# Patient Record
Sex: MALE | Race: WHITE | Employment: OTHER | URBAN - METROPOLITAN AREA
[De-identification: names, ages, dates, MRNs, and addresses within clinical notes are randomized per-mention and may not be internally consistent; named-entity substitution may affect disease eponyms.]

---

## 2018-08-19 PROBLEM — C43.59 MELANOMA OF BACK (HCC): Status: ACTIVE | Noted: 2018-08-19

## 2018-08-23 PROBLEM — M25.551 PAIN OF RIGHT HIP JOINT: Status: ACTIVE | Noted: 2018-08-23

## 2018-08-23 PROBLEM — M70.61 TROCHANTERIC BURSITIS OF RIGHT HIP: Status: ACTIVE | Noted: 2018-08-23

## 2018-09-07 ENCOUNTER — HOSPITAL ENCOUNTER (OUTPATIENT)
Dept: NUCLEAR MEDICINE | Facility: HOSPITAL | Age: 63
Discharge: HOME | End: 2018-09-07
Attending: SURGERY
Payer: COMMERCIAL

## 2018-09-07 ENCOUNTER — HOSPITAL ENCOUNTER (OUTPATIENT)
Facility: HOSPITAL | Age: 63
Setting detail: HOSPITAL OUTPATIENT SURGERY
Discharge: HOME | End: 2018-09-07
Attending: SURGERY | Admitting: SURGERY
Payer: COMMERCIAL

## 2018-09-07 ENCOUNTER — ANESTHESIA EVENT (OUTPATIENT)
Dept: SURGERY | Facility: HOSPITAL | Age: 63
End: 2018-09-07
Payer: COMMERCIAL

## 2018-09-07 ENCOUNTER — ANESTHESIA (OUTPATIENT)
Dept: SURGERY | Facility: HOSPITAL | Age: 63
End: 2018-09-07
Payer: COMMERCIAL

## 2018-09-07 VITALS
SYSTOLIC BLOOD PRESSURE: 128 MMHG | OXYGEN SATURATION: 92 % | RESPIRATION RATE: 16 BRPM | WEIGHT: 285 LBS | HEART RATE: 84 BPM | DIASTOLIC BLOOD PRESSURE: 78 MMHG | BODY MASS INDEX: 45.8 KG/M2 | TEMPERATURE: 97 F | HEIGHT: 66 IN

## 2018-09-07 DIAGNOSIS — C43.59 MELANOMA OF BACK (HCC): Primary | ICD-10-CM

## 2018-09-07 DIAGNOSIS — C43.9 MELANOMA (HCC): ICD-10-CM

## 2018-09-07 PROCEDURE — 88305 TISSUE EXAM BY PATHOLOGIST: CPT | Performed by: SURGERY

## 2018-09-07 PROCEDURE — 0HB6XZZ EXCISION OF BACK SKIN, EXTERNAL APPROACH: ICD-10-PCS | Performed by: SURGERY

## 2018-09-07 PROCEDURE — 88307 TISSUE EXAM BY PATHOLOGIST: CPT | Performed by: SURGERY

## 2018-09-07 PROCEDURE — 88341 IMHCHEM/IMCYTCHM EA ADD ANTB: CPT | Performed by: SURGERY

## 2018-09-07 PROCEDURE — 78195 LYMPH SYSTEM IMAGING: CPT | Performed by: SURGERY

## 2018-09-07 PROCEDURE — 88342 IMHCHEM/IMCYTCHM 1ST ANTB: CPT | Performed by: SURGERY

## 2018-09-07 PROCEDURE — 07B60ZX EXCISION OF LEFT AXILLARY LYMPHATIC, OPEN APPROACH, DIAGNOSTIC: ICD-10-PCS | Performed by: SURGERY

## 2018-09-07 PROCEDURE — 0JQ70ZZ REPAIR BACK SUBCUTANEOUS TISSUE AND FASCIA, OPEN APPROACH: ICD-10-PCS | Performed by: SURGERY

## 2018-09-07 RX ORDER — ROCURONIUM BROMIDE 10 MG/ML
INJECTION, SOLUTION INTRAVENOUS AS NEEDED
Status: DISCONTINUED | OUTPATIENT
Start: 2018-09-07 | End: 2018-09-07 | Stop reason: SURG

## 2018-09-07 RX ORDER — HYDROCODONE BITARTRATE AND ACETAMINOPHEN 5; 325 MG/1; MG/1
1 TABLET ORAL AS NEEDED
Status: DISCONTINUED | OUTPATIENT
Start: 2018-09-07 | End: 2018-09-07

## 2018-09-07 RX ORDER — MORPHINE SULFATE 4 MG/ML
4 INJECTION, SOLUTION INTRAMUSCULAR; INTRAVENOUS EVERY 10 MIN PRN
Status: DISCONTINUED | OUTPATIENT
Start: 2018-09-07 | End: 2018-09-07

## 2018-09-07 RX ORDER — MORPHINE SULFATE 4 MG/ML
2 INJECTION, SOLUTION INTRAMUSCULAR; INTRAVENOUS EVERY 10 MIN PRN
Status: DISCONTINUED | OUTPATIENT
Start: 2018-09-07 | End: 2018-09-07

## 2018-09-07 RX ORDER — ACETAMINOPHEN 500 MG
1000 TABLET ORAL ONCE
Status: COMPLETED | OUTPATIENT
Start: 2018-09-07 | End: 2018-09-07

## 2018-09-07 RX ORDER — SODIUM CHLORIDE, SODIUM LACTATE, POTASSIUM CHLORIDE, CALCIUM CHLORIDE 600; 310; 30; 20 MG/100ML; MG/100ML; MG/100ML; MG/100ML
INJECTION, SOLUTION INTRAVENOUS CONTINUOUS
Status: DISCONTINUED | OUTPATIENT
Start: 2018-09-07 | End: 2018-09-07

## 2018-09-07 RX ORDER — MORPHINE SULFATE 10 MG/ML
6 INJECTION, SOLUTION INTRAMUSCULAR; INTRAVENOUS EVERY 10 MIN PRN
Status: DISCONTINUED | OUTPATIENT
Start: 2018-09-07 | End: 2018-09-07

## 2018-09-07 RX ORDER — FAMOTIDINE 20 MG/1
20 TABLET ORAL ONCE
Status: COMPLETED | OUTPATIENT
Start: 2018-09-07 | End: 2018-09-07

## 2018-09-07 RX ORDER — HYDROCODONE BITARTRATE AND ACETAMINOPHEN 5; 325 MG/1; MG/1
1-2 TABLET ORAL EVERY 6 HOURS PRN
Qty: 30 TABLET | Refills: 0 | Status: SHIPPED | OUTPATIENT
Start: 2018-09-07 | End: 2018-09-27

## 2018-09-07 RX ORDER — HYDROCODONE BITARTRATE AND ACETAMINOPHEN 5; 325 MG/1; MG/1
2 TABLET ORAL AS NEEDED
Status: DISCONTINUED | OUTPATIENT
Start: 2018-09-07 | End: 2018-09-07

## 2018-09-07 RX ORDER — NALOXONE HYDROCHLORIDE 0.4 MG/ML
80 INJECTION, SOLUTION INTRAMUSCULAR; INTRAVENOUS; SUBCUTANEOUS AS NEEDED
Status: DISCONTINUED | OUTPATIENT
Start: 2018-09-07 | End: 2018-09-07

## 2018-09-07 RX ORDER — ONDANSETRON 2 MG/ML
4 INJECTION INTRAMUSCULAR; INTRAVENOUS ONCE AS NEEDED
Status: DISCONTINUED | OUTPATIENT
Start: 2018-09-07 | End: 2018-09-07

## 2018-09-07 RX ORDER — ONDANSETRON 2 MG/ML
INJECTION INTRAMUSCULAR; INTRAVENOUS AS NEEDED
Status: DISCONTINUED | OUTPATIENT
Start: 2018-09-07 | End: 2018-09-07 | Stop reason: SURG

## 2018-09-07 RX ORDER — DEXAMETHASONE SODIUM PHOSPHATE 4 MG/ML
VIAL (ML) INJECTION AS NEEDED
Status: DISCONTINUED | OUTPATIENT
Start: 2018-09-07 | End: 2018-09-07 | Stop reason: SURG

## 2018-09-07 RX ORDER — BUPIVACAINE HYDROCHLORIDE 5 MG/ML
INJECTION, SOLUTION EPIDURAL; INTRACAUDAL AS NEEDED
Status: DISCONTINUED | OUTPATIENT
Start: 2018-09-07 | End: 2018-09-07 | Stop reason: HOSPADM

## 2018-09-07 RX ORDER — GLYCOPYRROLATE 0.2 MG/ML
INJECTION INTRAMUSCULAR; INTRAVENOUS AS NEEDED
Status: DISCONTINUED | OUTPATIENT
Start: 2018-09-07 | End: 2018-09-07 | Stop reason: SURG

## 2018-09-07 RX ORDER — LIDOCAINE HYDROCHLORIDE 20 MG/ML
INJECTION, SOLUTION EPIDURAL; INFILTRATION; INTRACAUDAL; PERINEURAL AS NEEDED
Status: DISCONTINUED | OUTPATIENT
Start: 2018-09-07 | End: 2018-09-07 | Stop reason: SURG

## 2018-09-07 RX ORDER — METOCLOPRAMIDE 10 MG/1
10 TABLET ORAL ONCE
Status: COMPLETED | OUTPATIENT
Start: 2018-09-07 | End: 2018-09-07

## 2018-09-07 RX ADMIN — ONDANSETRON 4 MG: 2 INJECTION INTRAMUSCULAR; INTRAVENOUS at 09:41:00

## 2018-09-07 RX ADMIN — GLYCOPYRROLATE 0.2 MG: 0.2 INJECTION INTRAMUSCULAR; INTRAVENOUS at 09:41:00

## 2018-09-07 RX ADMIN — SODIUM CHLORIDE, SODIUM LACTATE, POTASSIUM CHLORIDE, CALCIUM CHLORIDE: 600; 310; 30; 20 INJECTION, SOLUTION INTRAVENOUS at 12:05:00

## 2018-09-07 RX ADMIN — ROCURONIUM BROMIDE 2 MG: 10 INJECTION, SOLUTION INTRAVENOUS at 09:41:00

## 2018-09-07 RX ADMIN — LIDOCAINE HYDROCHLORIDE 80 MG: 20 INJECTION, SOLUTION EPIDURAL; INFILTRATION; INTRACAUDAL; PERINEURAL at 09:41:00

## 2018-09-07 RX ADMIN — DEXAMETHASONE SODIUM PHOSPHATE 4 MG: 4 MG/ML VIAL (ML) INJECTION at 09:41:00

## 2018-09-07 RX ADMIN — SODIUM CHLORIDE, SODIUM LACTATE, POTASSIUM CHLORIDE, CALCIUM CHLORIDE: 600; 310; 30; 20 INJECTION, SOLUTION INTRAVENOUS at 09:35:00

## 2018-09-07 NOTE — BRIEF OP NOTE
[facility] POST ANESTHESIA CARE UNIT  Brief Op Note       Patients Name: Lyn Barrett  Attending Physician: Elvie Calhoun MD  Operating Physician: Elvie Calhoun MD  CSN: [id number]     Location:  OR  MRN: [id number]    Date of Birth: [birth year]

## 2018-09-07 NOTE — ANESTHESIA PREPROCEDURE EVALUATION
Anesthesia PreOp Note    HPI:     Estefany Bowman is a 61year old male who presents for preoperative consultation requested by: Katya Goldstein MD    Date of Surgery: 9/7/2018    Procedure(s):  EXCISION LESION TORSO/BACK  SENTINEL LYMPH NODE BIOPSY  In Sodium (ANCEF) 3 g in sodium chloride 0.9% 100 mL IVPB 3 g Intravenous Once Ann Amato MD      No current [facility]-ordered outpatient prescriptions on file.     No Known Allergies    Family History   Problem Relation Age of Onset   • Cancer Father      DONTA and Video laryngoscope  Post-op Pain Management: Oral pain medication  Informed Consent Plan and Risks Discussed With:  Patient and spouse      I have informed Katherine Tabor and/or legal guardian or family member of the nature of the anesthetic plan,

## 2018-09-07 NOTE — H&P
Bryanna Ha is a 61year old male complains of recent excision of melanoma left back upper to mid back. Patient with known severe psoriasis.   Pathology shows 0.9 mm  Breslow   Small amount of ulceration  1 mitosis per mm squared  No lymphovascula back as well  Patient shows me a picture of his back the lesion, it was sort of redness with some black, this area is clearly seen as a recent biopsy which is fairly large at least 2 x 2 cm.   HEENT: PERRLA, EOMI, anicteric, conjunctiva normal;   RESPIRATOR and sentinel lymph node.

## 2018-09-07 NOTE — ANESTHESIA PROCEDURE NOTES
Airway  Date/Time: 9/7/2018 9:45 AM  Urgency: elective    Airway not difficult    General Information and Staff    Patient location during procedure: OR  Anesthesiologist: ALEJANDRO ALDRICH  Resident/CRNA: Gage Miller  Performed: CRNA     Indications an

## 2018-09-07 NOTE — ANESTHESIA POSTPROCEDURE EVALUATION
Patient: Brent Black    Procedure Summary     Date:  09/07/18 Room / Location:  [address] [place] [address] MAIN OR 05 / [address] [place] [address] MAIN OR    Anesthesia Start:  5182 Anesthesia Stop:  8124    Procedures:       EXCISION LESION TORSO/BACK (Left )      SENTINEL LYMPH NODE BIOPSY (

## 2018-09-07 NOTE — DISCHARGE SUMMARY
Outpatient Surgery Brief Discharge Summary         Patient ID:  Paula Jackson  [id number]  88 year old  3/27/1955    Discharge Diagnoses: Melanoma back     Procedures:  Wide excision of lesion of back, sentinel lymph node biopsy    Discharged Neno may continue to shower after removal of dressing as long as it has been 48 hours since your surgery.     All incisions become somewhat hard and sometimes lumpy. That is part of the normal healing process.  Bruising around the incisions or lower is also norm bloated feeling in your abdomen. · To experience mild back pain or soreness for a day or two if you had spinal or epidural anesthesia. · If you had laparoscopic surgery, to feel shoulder pain or discomfort on the day of surgery.    · For some patients t

## 2018-09-10 NOTE — OPERATIVE REPORT
[facility]    PATIENT'S NAME: Jorge Luciano   ATTENDING PHYSICIAN: Evaristo Amador MD   OPERATING PHYSICIAN: Evaristo Amador MD   PATIENT ACCOUNT#:   [id number]    LOCATION:  [address] 10  MEDICAL RECORD #:   [id number]       DATE OF left, and got to approximately to at least 1.2 cm on each side of the previous scar, total for this was approximately 5 cm. We made an ellipse of this. It was 9 or 10 cm wide, estimated length. This was excised, taken all the way down to the muscle.   Flynn Muñoz

## 2018-10-15 NOTE — H&P
[facility]    PATIENT'S NAME: Theron Pantera   ATTENDING PHYSICIAN: Jude Luz MD   PATIENT ACCOUNT#:   [id number]    LOCATION:    MEDICAL RECORD #:   [id number]       YOB: 1955  ADMISSION DATE:       10/18/2018    HISTOR above.  His surgeon has done deep resection of the malignant melanoma. Biopsy showed no evidence of any melanomas left behind. One lymph node negative. Dictated By Mamadou Trimble.  Garcia Esquivel MD  d: 10/15/2018 14:51:44  t: 10/15/2018 15:40:12  [facility] [id number]

## 2018-10-18 ENCOUNTER — HOSPITAL ENCOUNTER (OUTPATIENT)
Facility: HOSPITAL | Age: 63
Setting detail: HOSPITAL OUTPATIENT SURGERY
Discharge: HOME | End: 2018-10-18
Attending: SPECIALIST | Admitting: SPECIALIST
Payer: COMMERCIAL

## 2018-10-18 ENCOUNTER — ANESTHESIA (OUTPATIENT)
Dept: ENDOSCOPY | Facility: HOSPITAL | Age: 63
End: 2018-10-18
Payer: COMMERCIAL

## 2018-10-18 ENCOUNTER — ANESTHESIA EVENT (OUTPATIENT)
Dept: ENDOSCOPY | Facility: HOSPITAL | Age: 63
End: 2018-10-18
Payer: COMMERCIAL

## 2018-10-18 DIAGNOSIS — Z12.11 COLON CANCER SCREENING: ICD-10-CM

## 2018-10-18 PROCEDURE — 0DBL8ZX EXCISION OF TRANSVERSE COLON, VIA NATURAL OR ARTIFICIAL OPENING ENDOSCOPIC, DIAGNOSTIC: ICD-10-PCS | Performed by: SPECIALIST

## 2018-10-18 PROCEDURE — 0DBH8ZX EXCISION OF CECUM, VIA NATURAL OR ARTIFICIAL OPENING ENDOSCOPIC, DIAGNOSTIC: ICD-10-PCS | Performed by: SPECIALIST

## 2018-10-18 PROCEDURE — 0DBK8ZX EXCISION OF ASCENDING COLON, VIA NATURAL OR ARTIFICIAL OPENING ENDOSCOPIC, DIAGNOSTIC: ICD-10-PCS | Performed by: SPECIALIST

## 2018-10-18 PROCEDURE — 88305 TISSUE EXAM BY PATHOLOGIST: CPT | Performed by: SPECIALIST

## 2018-10-18 PROCEDURE — 3E0H8GC INTRODUCTION OF OTHER THERAPEUTIC SUBSTANCE INTO LOWER GI, VIA NATURAL OR ARTIFICIAL OPENING ENDOSCOPIC: ICD-10-PCS | Performed by: SPECIALIST

## 2018-10-18 RX ORDER — SODIUM CHLORIDE, SODIUM LACTATE, POTASSIUM CHLORIDE, CALCIUM CHLORIDE 600; 310; 30; 20 MG/100ML; MG/100ML; MG/100ML; MG/100ML
INJECTION, SOLUTION INTRAVENOUS CONTINUOUS
Status: DISCONTINUED | OUTPATIENT
Start: 2018-10-18 | End: 2018-10-18

## 2018-10-18 RX ORDER — NALOXONE HYDROCHLORIDE 0.4 MG/ML
80 INJECTION, SOLUTION INTRAMUSCULAR; INTRAVENOUS; SUBCUTANEOUS AS NEEDED
Status: DISCONTINUED | OUTPATIENT
Start: 2018-10-18 | End: 2018-10-18

## 2018-10-18 RX ADMIN — SODIUM CHLORIDE, SODIUM LACTATE, POTASSIUM CHLORIDE, CALCIUM CHLORIDE: 600; 310; 30; 20 INJECTION, SOLUTION INTRAVENOUS at 11:32:00

## 2018-10-18 RX ADMIN — SODIUM CHLORIDE, SODIUM LACTATE, POTASSIUM CHLORIDE, CALCIUM CHLORIDE: 600; 310; 30; 20 INJECTION, SOLUTION INTRAVENOUS at 09:53:00

## 2018-10-18 NOTE — ANESTHESIA POSTPROCEDURE EVALUATION
Patient: Barbara Hampton    Procedure Summary     Date:  10/18/18 Room / Location:  [facility] ENDOSCOPY 01 / [facility] ENDOSCOPY    Anesthesia Start:  8268 Anesthesia Stop:  8882    Procedure:  COLONOSCOPY (N/A ) Diagnosis:       Colon cancer screening      (colon

## 2018-10-18 NOTE — ANESTHESIA PREPROCEDURE EVALUATION
Anesthesia PreOp Note    HPI:     Baylee Guillermo is a 61year old male who presents for preoperative consultation requested by: Chiquis Amaral MD    Date of Surgery: 10/18/2018    Procedure(s):  COLONOSCOPY  Indication: colon cancer screening, Ordered in Epic:  lactated ringers infusion  Intravenous Continuous Pippa Escamilla MD     No current [facility]-ordered outpatient medications on file.     No Known Allergies    Family History   Problem Relation Age of Onset   • Cancer Father         [facility] Cardiovascular - normal exam  Exercise tolerance: good  (+) hypertension,     Neuro/Psych - negative ROS     GI/Hepatic/Renal - negative ROS     Endo/Other - negative ROS   Abdominal              Anesthesia Plan:   ASA:  3  Plan:   MAC  Informed Consent P

## 2018-10-18 NOTE — OPERATIVE REPORT
[facility]    PATIENT'S NAME: Jorge Luciano   ATTENDING PHYSICIAN: Jude Santo MD   OPERATING PHYSICIAN: Nick Berg.  Adriel Santo MD   PATIENT ACCOUNT#:   [id number]    LOCATION:  [facility] ENDO POOL ROOM 9 [facility] 10  MEDICAL RECORD #:   [id number] aspirin for 2 weeks. Make an appointment to see me in 1 week. Call me if any pain, fever, or bleeding. We will have to repeat colonoscopy within the next 2 to 3 months to make sure there are no more polyps left.   The patient tolerated the procedure well

## 2018-10-19 VITALS
HEIGHT: 66 IN | RESPIRATION RATE: 18 BRPM | DIASTOLIC BLOOD PRESSURE: 77 MMHG | BODY MASS INDEX: 45.8 KG/M2 | OXYGEN SATURATION: 94 % | WEIGHT: 285 LBS | SYSTOLIC BLOOD PRESSURE: 113 MMHG | HEART RATE: 77 BPM

## 2019-02-16 NOTE — H&P
[facility]    PATIENT'S NAME: Theron Pantera   ATTENDING PHYSICIAN: Jude Luz MD   PATIENT ACCOUNT#:   [id number]    [facility]  MEDICAL RECORD #:   [id number]       YOB: 1955  ADMISSION DATE:       02/18/ getting a colonoscopy done to make sure he has no more polyps. Family history of colon cancer. Dictated By Triny Fuchs. Emilee Pryor MD  d: 02/15/2019 16:29:13  t: 02/15/2019 17:11:51  Job [id number]  MZS/    cc: Jodie Benitez.  Vandana Taylor MD

## 2019-02-18 ENCOUNTER — ANESTHESIA EVENT (OUTPATIENT)
Dept: ENDOSCOPY | Facility: HOSPITAL | Age: 64
End: 2019-02-18
Payer: COMMERCIAL

## 2019-02-18 ENCOUNTER — HOSPITAL ENCOUNTER (OUTPATIENT)
Facility: HOSPITAL | Age: 64
Setting detail: HOSPITAL OUTPATIENT SURGERY
Discharge: HOME | End: 2019-02-18
Attending: SPECIALIST | Admitting: SPECIALIST
Payer: COMMERCIAL

## 2019-02-18 ENCOUNTER — ANESTHESIA (OUTPATIENT)
Dept: ENDOSCOPY | Facility: HOSPITAL | Age: 64
End: 2019-02-18
Payer: COMMERCIAL

## 2019-02-18 VITALS
WEIGHT: 290 LBS | BODY MASS INDEX: 46.61 KG/M2 | HEART RATE: 81 BPM | HEIGHT: 66 IN | RESPIRATION RATE: 14 BRPM | OXYGEN SATURATION: 92 % | DIASTOLIC BLOOD PRESSURE: 82 MMHG | SYSTOLIC BLOOD PRESSURE: 140 MMHG

## 2019-02-18 DIAGNOSIS — K64.9 HEMORRHOIDS, UNSPECIFIED HEMORRHOID TYPE: ICD-10-CM

## 2019-02-18 DIAGNOSIS — K63.5 POLYP OF TRANSVERSE COLON, UNSPECIFIED TYPE: ICD-10-CM

## 2019-02-18 DIAGNOSIS — K63.5 POLYP OF DESCENDING COLON, UNSPECIFIED TYPE: ICD-10-CM

## 2019-02-18 DIAGNOSIS — K63.5 POLYP OF ASCENDING COLON, UNSPECIFIED TYPE: Primary | ICD-10-CM

## 2019-02-18 DIAGNOSIS — Z86.010 HISTORY OF COLON POLYPS: ICD-10-CM

## 2019-02-18 DIAGNOSIS — K57.90 DIVERTICULOSIS OF INTESTINE WITHOUT BLEEDING, UNSPECIFIED INTESTINAL TRACT LOCATION: ICD-10-CM

## 2019-02-18 PROCEDURE — 0DBK8ZX EXCISION OF ASCENDING COLON, VIA NATURAL OR ARTIFICIAL OPENING ENDOSCOPIC, DIAGNOSTIC: ICD-10-PCS | Performed by: SPECIALIST

## 2019-02-18 PROCEDURE — 88305 TISSUE EXAM BY PATHOLOGIST: CPT | Performed by: SPECIALIST

## 2019-02-18 PROCEDURE — 0DBC8ZX EXCISION OF ILEOCECAL VALVE, VIA NATURAL OR ARTIFICIAL OPENING ENDOSCOPIC, DIAGNOSTIC: ICD-10-PCS | Performed by: SPECIALIST

## 2019-02-18 PROCEDURE — 0DBL8ZX EXCISION OF TRANSVERSE COLON, VIA NATURAL OR ARTIFICIAL OPENING ENDOSCOPIC, DIAGNOSTIC: ICD-10-PCS | Performed by: SPECIALIST

## 2019-02-18 PROCEDURE — 0DBM8ZX EXCISION OF DESCENDING COLON, VIA NATURAL OR ARTIFICIAL OPENING ENDOSCOPIC, DIAGNOSTIC: ICD-10-PCS | Performed by: SPECIALIST

## 2019-02-18 RX ORDER — SODIUM CHLORIDE, SODIUM LACTATE, POTASSIUM CHLORIDE, CALCIUM CHLORIDE 600; 310; 30; 20 MG/100ML; MG/100ML; MG/100ML; MG/100ML
INJECTION, SOLUTION INTRAVENOUS CONTINUOUS
Status: DISCONTINUED | OUTPATIENT
Start: 2019-02-18 | End: 2019-02-18

## 2019-02-18 RX ORDER — MIDAZOLAM HYDROCHLORIDE 1 MG/ML
INJECTION INTRAMUSCULAR; INTRAVENOUS AS NEEDED
Status: DISCONTINUED | OUTPATIENT
Start: 2019-02-18 | End: 2019-02-18 | Stop reason: SURG

## 2019-02-18 RX ORDER — LIDOCAINE HYDROCHLORIDE 10 MG/ML
INJECTION, SOLUTION EPIDURAL; INFILTRATION; INTRACAUDAL; PERINEURAL AS NEEDED
Status: DISCONTINUED | OUTPATIENT
Start: 2019-02-18 | End: 2019-02-18 | Stop reason: SURG

## 2019-02-18 RX ORDER — NALOXONE HYDROCHLORIDE 0.4 MG/ML
80 INJECTION, SOLUTION INTRAMUSCULAR; INTRAVENOUS; SUBCUTANEOUS AS NEEDED
Status: DISCONTINUED | OUTPATIENT
Start: 2019-02-18 | End: 2019-02-18

## 2019-02-18 RX ADMIN — MIDAZOLAM HYDROCHLORIDE 2 MG: 1 INJECTION INTRAMUSCULAR; INTRAVENOUS at 09:21:00

## 2019-02-18 RX ADMIN — LIDOCAINE HYDROCHLORIDE 25 MG: 10 INJECTION, SOLUTION EPIDURAL; INFILTRATION; INTRACAUDAL; PERINEURAL at 09:22:00

## 2019-02-18 RX ADMIN — SODIUM CHLORIDE, SODIUM LACTATE, POTASSIUM CHLORIDE, CALCIUM CHLORIDE: 600; 310; 30; 20 INJECTION, SOLUTION INTRAVENOUS at 10:09:00

## 2019-02-18 RX ADMIN — SODIUM CHLORIDE, SODIUM LACTATE, POTASSIUM CHLORIDE, CALCIUM CHLORIDE: 600; 310; 30; 20 INJECTION, SOLUTION INTRAVENOUS at 09:20:00

## 2019-02-18 NOTE — ANESTHESIA POSTPROCEDURE EVALUATION
Patient: Soren Hall    Procedure Summary     Date:  02/18/19 Room / Location:  [address] [place] [address] ENDOSCOPY 05 / [address] [place] [address] ENDOSCOPY    Anesthesia Start:  2382 Anesthesia Stop:      Procedure:  COLONOSCOPY (N/A ) Diagnosis:       History of colon polyps      (Polyps,

## 2019-02-18 NOTE — OPERATIVE REPORT
[facility]    PATIENT'S NAME: Julián Hatch   ATTENDING PHYSICIAN: Jude Taylor MD   OPERATING PHYSICIAN: Emeka Medina.  Obed Taylor MD   PATIENT ACCOUNT#:   [id number]    LOCATION:  [facility] ROOM 11 [facility] 10  MEDICAL RECORD #:   [id number]

## 2019-02-18 NOTE — ANESTHESIA PREPROCEDURE EVALUATION
Anesthesia PreOp Note    HPI:     Marielena Gonzalez is a 61year old male who presents for preoperative consultation requested by: Michelle Renee MD    Date of Surgery: 2/18/2019    Procedure(s):  COLONOSCOPY  Indication: HX COLON POLYPS    Ksenia Reynolds ALTACE 10 MG OR CAPS 2 daily Disp:  Rfl:  2/17/2019 at 0800   ASPIRIN 81 MG OR TABS 1 TABLET DAILY Disp:  Rfl:  2/11/2019       Current Facility-Administered Medications Ordered in Epic:  lactated ringers infusion  Intravenous Continuous Sait, Kait [facility] Physically abused: Not on file        Forced sexual activity: Not on file    Other Topics      Concerns:        Not on file    Social History Narrative      Not on file      Available pre-op labs reviewed. Vital Signs:   Body mass index is

## 2020-08-05 ENCOUNTER — HOSPITAL ENCOUNTER (OUTPATIENT)
Dept: GENERAL RADIOLOGY | Age: 65
Discharge: HOME | End: 2020-08-05
Attending: INTERNAL MEDICINE
Payer: COMMERCIAL

## 2020-08-05 DIAGNOSIS — R05.9 COUGH: ICD-10-CM

## 2020-08-05 PROCEDURE — 71046 X-RAY EXAM CHEST 2 VIEWS: CPT | Performed by: INTERNAL MEDICINE

## 2020-08-20 ENCOUNTER — LAB ENCOUNTER (OUTPATIENT)
Dept: LAB | Facility: HOSPITAL | Age: 65
End: 2020-08-20
Attending: SURGERY
Payer: COMMERCIAL

## 2020-08-20 DIAGNOSIS — R19.8 INCREASED ABDOMINAL GIRTH: ICD-10-CM

## 2020-08-20 DIAGNOSIS — K42.9 UMBILICAL HERNIA WITHOUT OBSTRUCTION AND WITHOUT GANGRENE: ICD-10-CM

## 2020-08-20 DIAGNOSIS — Z12.5 PROSTATE CANCER SCREENING: ICD-10-CM

## 2020-08-20 LAB
ALBUMIN SERPL-MCNC: 2.7 G/DL (ref 3.4–5)
ALBUMIN/GLOB SERPL: 0.5 {RATIO} (ref 1–2)
ALP LIVER SERPL-CCNC: 87 U/L (ref 45–117)
ALT SERPL-CCNC: 50 U/L (ref 16–61)
ANION GAP SERPL CALC-SCNC: 4 MMOL/L (ref 0–18)
APTT PPP: 34.8 SECONDS (ref 23.2–35.3)
AST SERPL-CCNC: 62 U/L (ref 15–37)
BASOPHILS # BLD AUTO: 0.02 X10(3) UL (ref 0–0.2)
BASOPHILS NFR BLD AUTO: 0.4 %
BILIRUB SERPL-MCNC: 0.8 MG/DL (ref 0.1–2)
BUN BLD-MCNC: 15 MG/DL (ref 7–18)
BUN/CREAT SERPL: 13.3 (ref 10–20)
CALCIUM BLD-MCNC: 8.7 MG/DL (ref 8.5–10.1)
CHLORIDE SERPL-SCNC: 105 MMOL/L (ref 98–112)
CO2 SERPL-SCNC: 30 MMOL/L (ref 21–32)
CREAT BLD-MCNC: 1.13 MG/DL (ref 0.7–1.3)
DEPRECATED RDW RBC AUTO: 48.8 FL (ref 35.1–46.3)
EOSINOPHIL # BLD AUTO: 0.22 X10(3) UL (ref 0–0.7)
EOSINOPHIL NFR BLD AUTO: 4.9 %
ERYTHROCYTE [DISTWIDTH] IN BLOOD BY AUTOMATED COUNT: 15 % (ref 11–15)
GLOBULIN PLAS-MCNC: 5 G/DL (ref 2.8–4.4)
GLUCOSE BLD-MCNC: 109 MG/DL (ref 70–99)
HCT VFR BLD AUTO: 37.2 % (ref 39–53)
HGB BLD-MCNC: 12 G/DL (ref 13–17.5)
IMM GRANULOCYTES # BLD AUTO: 0.01 X10(3) UL (ref 0–1)
IMM GRANULOCYTES NFR BLD: 0.2 %
INR BLD: 1.19 (ref 0.9–1.2)
LYMPHOCYTES # BLD AUTO: 0.63 X10(3) UL (ref 1–4)
LYMPHOCYTES NFR BLD AUTO: 14.2 %
M PROTEIN MFR SERPL ELPH: 7.7 G/DL (ref 6.4–8.2)
MCH RBC QN AUTO: 28.8 PG (ref 26–34)
MCHC RBC AUTO-ENTMCNC: 32.3 G/DL (ref 31–37)
MCV RBC AUTO: 89.2 FL (ref 80–100)
MONOCYTES # BLD AUTO: 0.44 X10(3) UL (ref 0.1–1)
MONOCYTES NFR BLD AUTO: 9.9 %
NEUTROPHILS # BLD AUTO: 3.13 X10 (3) UL (ref 1.5–7.7)
NEUTROPHILS # BLD AUTO: 3.13 X10(3) UL (ref 1.5–7.7)
NEUTROPHILS NFR BLD AUTO: 70.4 %
OSMOLALITY SERPL CALC.SUM OF ELEC: 289 MOSM/KG (ref 275–295)
PATIENT FASTING Y/N/NP: NO
PLATELET # BLD AUTO: 96 10(3)UL (ref 150–450)
POTASSIUM SERPL-SCNC: 4.1 MMOL/L (ref 3.5–5.1)
PROTHROMBIN TIME: 14.9 SECONDS (ref 11.8–14.5)
PSA SERPL-MCNC: 0.68 NG/ML (ref ?–4)
RBC # BLD AUTO: 4.17 X10(6)UL (ref 3.8–5.8)
SODIUM SERPL-SCNC: 139 MMOL/L (ref 136–145)
WBC # BLD AUTO: 4.5 X10(3) UL (ref 4–11)

## 2020-08-20 PROCEDURE — 85730 THROMBOPLASTIN TIME PARTIAL: CPT

## 2020-08-20 PROCEDURE — 36415 COLL VENOUS BLD VENIPUNCTURE: CPT

## 2020-08-20 PROCEDURE — 84153 ASSAY OF PSA TOTAL: CPT

## 2020-08-20 PROCEDURE — 80053 COMPREHEN METABOLIC PANEL: CPT

## 2020-08-20 PROCEDURE — 85025 COMPLETE CBC W/AUTO DIFF WBC: CPT

## 2020-08-20 PROCEDURE — 85610 PROTHROMBIN TIME: CPT

## 2020-08-27 ENCOUNTER — HOSPITAL ENCOUNTER (OUTPATIENT)
Dept: CT IMAGING | Age: 65
Discharge: HOME | End: 2020-08-27
Attending: SURGERY
Payer: COMMERCIAL

## 2020-08-27 DIAGNOSIS — R19.8 INCREASED ABDOMINAL GIRTH: ICD-10-CM

## 2020-08-27 DIAGNOSIS — K42.9 UMBILICAL HERNIA WITHOUT OBSTRUCTION AND WITHOUT GANGRENE: ICD-10-CM

## 2020-08-27 PROCEDURE — 74177 CT ABD & PELVIS W/CONTRAST: CPT | Performed by: SURGERY

## 2020-08-31 DIAGNOSIS — K76.89 LIVER NODULE: Primary | ICD-10-CM

## 2020-12-28 ENCOUNTER — HOSPITAL ENCOUNTER (EMERGENCY)
Facility: HOSPITAL | Age: 65
Discharge: HOME | End: 2020-12-28
Attending: EMERGENCY MEDICINE
Payer: COMMERCIAL

## 2020-12-28 ENCOUNTER — APPOINTMENT (OUTPATIENT)
Dept: GENERAL RADIOLOGY | Facility: HOSPITAL | Age: 65
End: 2020-12-28
Payer: COMMERCIAL

## 2020-12-28 ENCOUNTER — APPOINTMENT (OUTPATIENT)
Dept: CT IMAGING | Facility: HOSPITAL | Age: 65
End: 2020-12-28
Attending: EMERGENCY MEDICINE
Payer: COMMERCIAL

## 2020-12-28 ENCOUNTER — HOSPITAL ENCOUNTER (OUTPATIENT)
Age: 65
Discharge: ANOTHER HOSPITAL | End: 2020-12-28
Payer: COMMERCIAL

## 2020-12-28 VITALS
HEIGHT: 66 IN | DIASTOLIC BLOOD PRESSURE: 76 MMHG | BODY MASS INDEX: 50.3 KG/M2 | HEART RATE: 88 BPM | OXYGEN SATURATION: 96 % | RESPIRATION RATE: 28 BRPM | WEIGHT: 313 LBS | TEMPERATURE: 98 F | SYSTOLIC BLOOD PRESSURE: 137 MMHG

## 2020-12-28 VITALS
OXYGEN SATURATION: 97 % | DIASTOLIC BLOOD PRESSURE: 68 MMHG | HEART RATE: 100 BPM | SYSTOLIC BLOOD PRESSURE: 116 MMHG | TEMPERATURE: 97 F | RESPIRATION RATE: 20 BRPM | BODY MASS INDEX: 51 KG/M2 | WEIGHT: 313 LBS

## 2020-12-28 DIAGNOSIS — R16.2 HEPATOSPLENOMEGALY: ICD-10-CM

## 2020-12-28 DIAGNOSIS — R07.9 ACUTE CHEST PAIN: Primary | ICD-10-CM

## 2020-12-28 DIAGNOSIS — R60.9 SWELLING: ICD-10-CM

## 2020-12-28 DIAGNOSIS — R18.8 OTHER ASCITES: Primary | ICD-10-CM

## 2020-12-28 PROCEDURE — 86706 HEP B SURFACE ANTIBODY: CPT | Performed by: EMERGENCY MEDICINE

## 2020-12-28 PROCEDURE — 84466 ASSAY OF TRANSFERRIN: CPT | Performed by: EMERGENCY MEDICINE

## 2020-12-28 PROCEDURE — 80500 HEPATITIS A B + C PROFILE WITH PATHOLOGY INTERPRETATION: CPT | Performed by: EMERGENCY MEDICINE

## 2020-12-28 PROCEDURE — 86256 FLUORESCENT ANTIBODY TITER: CPT | Performed by: EMERGENCY MEDICINE

## 2020-12-28 PROCEDURE — 36415 COLL VENOUS BLD VENIPUNCTURE: CPT

## 2020-12-28 PROCEDURE — 99204 OFFICE O/P NEW MOD 45 MIN: CPT

## 2020-12-28 PROCEDURE — 74177 CT ABD & PELVIS W/CONTRAST: CPT | Performed by: EMERGENCY MEDICINE

## 2020-12-28 PROCEDURE — 83880 ASSAY OF NATRIURETIC PEPTIDE: CPT | Performed by: EMERGENCY MEDICINE

## 2020-12-28 PROCEDURE — 87340 HEPATITIS B SURFACE AG IA: CPT | Performed by: EMERGENCY MEDICINE

## 2020-12-28 PROCEDURE — 80076 HEPATIC FUNCTION PANEL: CPT | Performed by: EMERGENCY MEDICINE

## 2020-12-28 PROCEDURE — 85610 PROTHROMBIN TIME: CPT | Performed by: EMERGENCY MEDICINE

## 2020-12-28 PROCEDURE — 86704 HEP B CORE ANTIBODY TOTAL: CPT | Performed by: EMERGENCY MEDICINE

## 2020-12-28 PROCEDURE — 83540 ASSAY OF IRON: CPT | Performed by: EMERGENCY MEDICINE

## 2020-12-28 PROCEDURE — 93010 ELECTROCARDIOGRAM REPORT: CPT

## 2020-12-28 PROCEDURE — 99214 OFFICE O/P EST MOD 30 MIN: CPT

## 2020-12-28 PROCEDURE — 71045 X-RAY EXAM CHEST 1 VIEW: CPT

## 2020-12-28 PROCEDURE — 85025 COMPLETE CBC W/AUTO DIFF WBC: CPT | Performed by: EMERGENCY MEDICINE

## 2020-12-28 PROCEDURE — 93010 ELECTROCARDIOGRAM REPORT: CPT | Performed by: NURSE PRACTITIONER

## 2020-12-28 PROCEDURE — 99285 EMERGENCY DEPT VISIT HI MDM: CPT

## 2020-12-28 PROCEDURE — 84484 ASSAY OF TROPONIN QUANT: CPT | Performed by: EMERGENCY MEDICINE

## 2020-12-28 PROCEDURE — 86038 ANTINUCLEAR ANTIBODIES: CPT | Performed by: EMERGENCY MEDICINE

## 2020-12-28 PROCEDURE — 86803 HEPATITIS C AB TEST: CPT | Performed by: EMERGENCY MEDICINE

## 2020-12-28 PROCEDURE — 93005 ELECTROCARDIOGRAM TRACING: CPT

## 2020-12-28 PROCEDURE — 83735 ASSAY OF MAGNESIUM: CPT | Performed by: EMERGENCY MEDICINE

## 2020-12-28 PROCEDURE — 86708 HEPATITIS A ANTIBODY: CPT | Performed by: EMERGENCY MEDICINE

## 2020-12-28 PROCEDURE — 80048 BASIC METABOLIC PNL TOTAL CA: CPT | Performed by: EMERGENCY MEDICINE

## 2020-12-28 PROCEDURE — 82728 ASSAY OF FERRITIN: CPT | Performed by: EMERGENCY MEDICINE

## 2020-12-28 NOTE — ED PROVIDER NOTES
Patient presents with:  Swelling  Chest Pain      HPI:     This 72year old male presents with a chief complaint of left-sided chest pain that started a couple days ago. The patient denies any radiation of pain.   He states the pain is under a patch of pso Sexual Activity      Alcohol use: Yes        Comment:  Occ      Drug use: No      Sexual activity: Not on file    Lifestyle      Physical activity        Days per week: Not on file        Minutes per session: Not on file      Stress: Not on file    Relation performed this visit:  No results found for this or any previous visit (from the past 10 hour(s)). EKG:  Rate, axis and interval noted. Rate is 93               Rhythm is Right BBB, new from previous EKG in 2015              No ST elevation.       MDM

## 2020-12-28 NOTE — ED NOTES
Pt updated on POC and aware of CT scan. Pt is agreeable to this. Will continue to monitor, call light within reach and family at [facility].

## 2020-12-28 NOTE — ED INITIAL ASSESSMENT (HPI)
PATIENT REPORTS LEFT SIDED CHEST PAIN FOR THE PAST WEEK. STATES FOR THE LAST WEEK THE SKIN ON TOP HAS FELT NUMB, PATIENT WITH HX OF ECZEMA ALSO WITH AN ECZEMA PATCH TO HER LEFT CHEST. DENIES SOB, SWEATING.   PATIENT ALSO NOTED EXCESS SWELLING TO LEGS, ABD

## 2020-12-28 NOTE — ED INITIAL ASSESSMENT (HPI)
Non-radiating left-sided CP x2-3 days. Sent from [address] [place] [address]. Denies sob. No diaphoresis. Also reports abd swelling and leg swelling.

## 2020-12-28 NOTE — ED PROVIDER NOTES
Patient Seen in: [facility] AND [facility] Emergency Department      History   Patient presents with:  Chest Pain Angina  Swelling Edema    Stated Complaint: chest pain    HPI    60-year-old male presents for evaluation for chest pain.   Pain began a few days ago 5      Smokeless tobacco: Never Used      Tobacco comment: Quit 2014    Alcohol use: Yes      Comment: Occ    Drug use: No             Review of Systems   Constitutional: Negative. HENT: Negative. Eyes: Negative. Respiratory: Negative.     Loise Chol Comments: Abdominal wall pitting edema   Musculoskeletal: Normal range of motion. General: No deformity. Right lower leg: 3+ Edema present. Left lower leg: 3+ Edema present. Skin:     General: Skin is warm and dry.    Neurological: Abnormality         Status                     ---------                               -----------         ------                     CBC W/ DIFFERENTIAL[[id number]]          Abnormal            Final result                 Please FINDINGS:  CARDIAC/VASC: Normal.  No cardiac silhouette abnormality or cardiomegaly. Unremarkable pulmonary vasculature. MEDIAST/PRECIOUS:   No visible mass or adenopathy. LUNGS/PLEURA: Normal.  No significant pulmonary parenchymal abnormalities.   No effusio Duodenum is unremarkable. PANCREAS: Majority the pancreas is fatty replaced. No suspicious pancreatic lesion or evidence of acute pancreatitis. ADRENALS: No nodule or enlargement. KIDNEYS: No enhancing renal lesion or hydronephrosis.   There is a 56 mm ulises Right renal cyst.  Nonobstructing left renal stone. 4.  Moderate-sized hiatal hernia.    Dictated by (CST): Mack Scherer MD on 12/28/2020 at 6:08 PM     Finalized by (CST): Mack Scherer MD on 12/28/2020 at 6:13 PM              Clinical impression as well as

## 2020-12-28 NOTE — ED NOTES
TO [facility] VIA PRIVATE VEHICLE FOR FURTHER EVALUATION, ACCOMPANIED BY HIS WIFE.

## 2020-12-29 PROBLEM — K74.60 LIVER CIRRHOSIS SECONDARY TO NASH (HCC): Status: ACTIVE | Noted: 2020-12-29

## 2020-12-29 PROBLEM — K76.6 PORTAL HYPERTENSION (HCC): Status: ACTIVE | Noted: 2020-12-29

## 2020-12-29 PROBLEM — K75.81 LIVER CIRRHOSIS SECONDARY TO NASH (HCC): Status: ACTIVE | Noted: 2020-12-29

## 2020-12-29 PROBLEM — R18.8 OTHER ASCITES: Status: ACTIVE | Noted: 2020-12-29

## 2020-12-30 ENCOUNTER — HOSPITAL ENCOUNTER (OUTPATIENT)
Dept: ULTRASOUND IMAGING | Facility: HOSPITAL | Age: 65
Discharge: HOME | End: 2020-12-30
Attending: INTERNAL MEDICINE
Payer: COMMERCIAL

## 2020-12-30 VITALS — RESPIRATION RATE: 16 BRPM | DIASTOLIC BLOOD PRESSURE: 84 MMHG | SYSTOLIC BLOOD PRESSURE: 146 MMHG | HEART RATE: 100 BPM

## 2020-12-30 DIAGNOSIS — R18.8 OTHER ASCITES: ICD-10-CM

## 2020-12-30 LAB
ALBUMIN FLD-MCNC: 0.7 G/DL
COLOR FLD: YELLOW
PROT PRT-MCNC: 1.4 G/DL
RBC # FLD: 1287 /CUMM (ref ?–1)
WBC # FLD: 198 /CUMM (ref ?–1)

## 2020-12-30 PROCEDURE — 87070 CULTURE OTHR SPECIMN AEROBIC: CPT | Performed by: INTERNAL MEDICINE

## 2020-12-30 PROCEDURE — 84157 ASSAY OF PROTEIN OTHER: CPT | Performed by: INTERNAL MEDICINE

## 2020-12-30 PROCEDURE — P9047 ALBUMIN (HUMAN), 25%, 50ML: HCPCS

## 2020-12-30 PROCEDURE — 49083 ABD PARACENTESIS W/IMAGING: CPT | Performed by: INTERNAL MEDICINE

## 2020-12-30 PROCEDURE — 89050 BODY FLUID CELL COUNT: CPT | Performed by: INTERNAL MEDICINE

## 2020-12-30 PROCEDURE — 88160 CYTOPATH SMEAR OTHER SOURCE: CPT | Performed by: INTERNAL MEDICINE

## 2020-12-30 PROCEDURE — 89051 BODY FLUID CELL COUNT: CPT | Performed by: INTERNAL MEDICINE

## 2020-12-30 PROCEDURE — 87205 SMEAR GRAM STAIN: CPT | Performed by: INTERNAL MEDICINE

## 2020-12-30 PROCEDURE — 82042 OTHER SOURCE ALBUMIN QUAN EA: CPT | Performed by: INTERNAL MEDICINE

## 2020-12-30 RX ORDER — ALBUMIN (HUMAN) 12.5 G/50ML
SOLUTION INTRAVENOUS
Status: COMPLETED
Start: 2020-12-30 | End: 2020-12-30

## 2020-12-30 RX ORDER — ALBUMIN (HUMAN) 12.5 G/50ML
100 SOLUTION INTRAVENOUS AS NEEDED
Status: DISCONTINUED | OUTPATIENT
Start: 2020-12-30 | End: 2021-01-01

## 2020-12-30 RX ADMIN — ALBUMIN (HUMAN) 100 ML: 12.5 SOLUTION INTRAVENOUS at 13:47:00

## 2020-12-30 RX ADMIN — ALBUMIN (HUMAN) 100 ML: 12.5 SOLUTION INTRAVENOUS at 13:25:00

## 2020-12-30 NOTE — IMAGING NOTE
7957 Pt to ultrasound room scouts taken by [address] [place] [address],[address], [address]    0655 Hx taken procedure explained questions answered     [phone] Patient consented. Pt to get albumin 25% 25 grams yes     1300 M SABINE KELLY  Here to access- scanning completed and reviewed.

## 2020-12-31 LAB
BASOPHILS NFR FLD: 0 %
EOSINOPHIL NFR FLD: 0 %
LYMPHOCYTES NFR FLD: 15 %
MONOCYTES NFR FLD: 82 %
NEUTROPHILS NFR FLD: 2 %
WBC OTHER NFR FLD: 1 %

## 2021-01-20 ENCOUNTER — HOSPITAL ENCOUNTER (OUTPATIENT)
Dept: ULTRASOUND IMAGING | Facility: HOSPITAL | Age: 66
Discharge: HOME | End: 2021-01-20
Attending: INTERNAL MEDICINE
Payer: COMMERCIAL

## 2021-01-20 VITALS
WEIGHT: 298.38 LBS | OXYGEN SATURATION: 95 % | BODY MASS INDEX: 47.95 KG/M2 | HEART RATE: 106 BPM | HEIGHT: 66 IN | DIASTOLIC BLOOD PRESSURE: 76 MMHG | SYSTOLIC BLOOD PRESSURE: 137 MMHG

## 2021-01-20 DIAGNOSIS — R18.8 OTHER ASCITES: ICD-10-CM

## 2021-01-20 LAB
DEPRECATED RDW RBC AUTO: 51.7 FL (ref 35.1–46.3)
ERYTHROCYTE [DISTWIDTH] IN BLOOD BY AUTOMATED COUNT: 15.9 % (ref 11–15)
HCT VFR BLD AUTO: 35.3 %
HGB BLD-MCNC: 11.4 G/DL
MCH RBC QN AUTO: 28.7 PG (ref 26–34)
MCHC RBC AUTO-ENTMCNC: 32.3 G/DL (ref 31–37)
MCV RBC AUTO: 88.9 FL
PLATELET # BLD AUTO: 91 10(3)UL (ref 150–450)
RBC # BLD AUTO: 3.97 X10(6)UL
WBC # BLD AUTO: 4.2 X10(3) UL (ref 4–11)

## 2021-01-20 PROCEDURE — 49083 ABD PARACENTESIS W/IMAGING: CPT | Performed by: INTERNAL MEDICINE

## 2021-01-20 PROCEDURE — 85027 COMPLETE CBC AUTOMATED: CPT | Performed by: CLINICAL NURSE SPECIALIST

## 2021-01-20 PROCEDURE — 36415 COLL VENOUS BLD VENIPUNCTURE: CPT | Performed by: CLINICAL NURSE SPECIALIST

## 2021-01-20 NOTE — IMAGING NOTE
1245 Pt to ultrasound room scouts taken by VKII RAMOS RT    Hx taken procedure explained questions answered-GARCIA cirrhosis      1240 Patient consented.     Pt to get albumin 25% 25 grams  caitlin KELLY  Here to access- scanning completed and reviewe

## 2021-02-02 DIAGNOSIS — Z23 NEED FOR VACCINATION: ICD-10-CM

## 2021-02-03 ENCOUNTER — IMMUNIZATION (OUTPATIENT)
Dept: LAB | Age: 66
End: 2021-02-03
Attending: HOSPITALIST
Payer: COMMERCIAL

## 2021-02-03 DIAGNOSIS — Z23 NEED FOR VACCINATION: Primary | ICD-10-CM

## 2021-02-03 PROCEDURE — 0001A SARSCOV2 VAC 30MCG/0.3ML IM: CPT

## 2021-02-24 ENCOUNTER — IMMUNIZATION (OUTPATIENT)
Dept: LAB | Age: 66
End: 2021-02-24
Attending: HOSPITALIST
Payer: COMMERCIAL

## 2021-02-24 DIAGNOSIS — Z23 NEED FOR VACCINATION: Primary | ICD-10-CM

## 2021-02-24 PROCEDURE — 0002A SARSCOV2 VAC 30MCG/0.3ML IM: CPT

## 2021-03-10 DIAGNOSIS — K74.60 CIRRHOSIS OF LIVER WITHOUT ASCITES, UNSPECIFIED HEPATIC CIRRHOSIS TYPE (HCC): Primary | ICD-10-CM

## 2021-04-07 RX ORDER — FLUTICASONE FUROATE AND VILANTEROL TRIFENATATE 100; 25 UG/1; UG/1
1 POWDER RESPIRATORY (INHALATION) DAILY
COMMUNITY
End: 2021-06-28

## 2021-04-07 RX ORDER — AMOXICILLIN 500 MG/1
500 CAPSULE ORAL 3 TIMES DAILY
COMMUNITY
End: 2021-06-28

## 2021-04-07 RX ORDER — ESCITALOPRAM OXALATE 10 MG/1
10 TABLET ORAL DAILY
COMMUNITY

## 2021-04-07 RX ORDER — MONTELUKAST SODIUM 10 MG/1
10 TABLET ORAL NIGHTLY
COMMUNITY

## 2021-04-12 PROBLEM — K76.82 HEPATIC ENCEPHALOPATHY (HCC): Status: ACTIVE | Noted: 2021-04-12

## 2021-04-12 PROBLEM — K72.90 HEPATIC ENCEPHALOPATHY (HCC): Status: ACTIVE | Noted: 2021-04-12

## 2021-04-12 PROBLEM — K76.82 HEPATIC ENCEPHALOPATHY: Status: ACTIVE | Noted: 2021-04-12

## 2021-04-18 ENCOUNTER — LAB ENCOUNTER (OUTPATIENT)
Dept: LAB | Facility: HOSPITAL | Age: 66
End: 2021-04-18
Attending: INTERNAL MEDICINE
Payer: COMMERCIAL

## 2021-04-18 DIAGNOSIS — Z01.818 PRE-OP TESTING: ICD-10-CM

## 2021-04-21 ENCOUNTER — ANESTHESIA EVENT (OUTPATIENT)
Dept: ENDOSCOPY | Facility: HOSPITAL | Age: 66
End: 2021-04-21
Payer: COMMERCIAL

## 2021-04-21 ENCOUNTER — HOSPITAL ENCOUNTER (OUTPATIENT)
Facility: HOSPITAL | Age: 66
Setting detail: HOSPITAL OUTPATIENT SURGERY
Discharge: HOME | End: 2021-04-21
Attending: INTERNAL MEDICINE | Admitting: INTERNAL MEDICINE
Payer: COMMERCIAL

## 2021-04-21 ENCOUNTER — ANESTHESIA (OUTPATIENT)
Dept: ENDOSCOPY | Facility: HOSPITAL | Age: 66
End: 2021-04-21
Payer: COMMERCIAL

## 2021-04-21 VITALS
WEIGHT: 265 LBS | OXYGEN SATURATION: 94 % | RESPIRATION RATE: 12 BRPM | SYSTOLIC BLOOD PRESSURE: 96 MMHG | HEART RATE: 68 BPM | TEMPERATURE: 99 F | HEIGHT: 66 IN | DIASTOLIC BLOOD PRESSURE: 54 MMHG | BODY MASS INDEX: 42.59 KG/M2

## 2021-04-21 DIAGNOSIS — Z01.818 PRE-OP TESTING: Primary | ICD-10-CM

## 2021-04-21 DIAGNOSIS — K74.60 LIVER CIRRHOSIS SECONDARY TO NASH (HCC): ICD-10-CM

## 2021-04-21 DIAGNOSIS — K75.81 LIVER CIRRHOSIS SECONDARY TO NASH (HCC): ICD-10-CM

## 2021-04-21 DIAGNOSIS — Z86.010 PERSONAL HISTORY OF COLONIC POLYPS: ICD-10-CM

## 2021-04-21 PROCEDURE — 0DJ08ZZ INSPECTION OF UPPER INTESTINAL TRACT, VIA NATURAL OR ARTIFICIAL OPENING ENDOSCOPIC: ICD-10-PCS | Performed by: INTERNAL MEDICINE

## 2021-04-21 PROCEDURE — 90472 IMMUNIZATION ADMIN EACH ADD: CPT

## 2021-04-21 PROCEDURE — 0DBL8ZX EXCISION OF TRANSVERSE COLON, VIA NATURAL OR ARTIFICIAL OPENING ENDOSCOPIC, DIAGNOSTIC: ICD-10-PCS | Performed by: INTERNAL MEDICINE

## 2021-04-21 PROCEDURE — 88305 TISSUE EXAM BY PATHOLOGIST: CPT | Performed by: INTERNAL MEDICINE

## 2021-04-21 PROCEDURE — 90471 IMMUNIZATION ADMIN: CPT

## 2021-04-21 PROCEDURE — 90471 IMMUNIZATION ADMIN: CPT | Performed by: ANESTHESIOLOGY

## 2021-04-21 PROCEDURE — 90472 IMMUNIZATION ADMIN EACH ADD: CPT | Performed by: ANESTHESIOLOGY

## 2021-04-21 RX ORDER — SODIUM CHLORIDE, SODIUM LACTATE, POTASSIUM CHLORIDE, CALCIUM CHLORIDE 600; 310; 30; 20 MG/100ML; MG/100ML; MG/100ML; MG/100ML
INJECTION, SOLUTION INTRAVENOUS CONTINUOUS
Status: DISCONTINUED | OUTPATIENT
Start: 2021-04-21 | End: 2021-04-21

## 2021-04-21 RX ORDER — HYDROCODONE BITARTRATE AND ACETAMINOPHEN 10; 325 MG/1; MG/1
1 TABLET ORAL AS NEEDED
Status: DISCONTINUED | OUTPATIENT
Start: 2021-04-21 | End: 2021-04-21

## 2021-04-21 RX ORDER — LIDOCAINE HYDROCHLORIDE 10 MG/ML
INJECTION, SOLUTION EPIDURAL; INFILTRATION; INTRACAUDAL; PERINEURAL AS NEEDED
Status: DISCONTINUED | OUTPATIENT
Start: 2021-04-21 | End: 2021-04-21 | Stop reason: SURG

## 2021-04-21 RX ORDER — ONDANSETRON 2 MG/ML
4 INJECTION INTRAMUSCULAR; INTRAVENOUS AS NEEDED
Status: DISCONTINUED | OUTPATIENT
Start: 2021-04-21 | End: 2021-04-21

## 2021-04-21 RX ORDER — NALOXONE HYDROCHLORIDE 0.4 MG/ML
80 INJECTION, SOLUTION INTRAMUSCULAR; INTRAVENOUS; SUBCUTANEOUS AS NEEDED
Status: DISCONTINUED | OUTPATIENT
Start: 2021-04-21 | End: 2021-04-21

## 2021-04-21 RX ORDER — HYDROCODONE BITARTRATE AND ACETAMINOPHEN 10; 325 MG/1; MG/1
2 TABLET ORAL AS NEEDED
Status: DISCONTINUED | OUTPATIENT
Start: 2021-04-21 | End: 2021-04-21

## 2021-04-21 RX ORDER — PHENYLEPHRINE HCL 10 MG/ML
VIAL (ML) INJECTION AS NEEDED
Status: DISCONTINUED | OUTPATIENT
Start: 2021-04-21 | End: 2021-04-21 | Stop reason: SURG

## 2021-04-21 RX ORDER — METOCLOPRAMIDE HYDROCHLORIDE 5 MG/ML
10 INJECTION INTRAMUSCULAR; INTRAVENOUS AS NEEDED
Status: DISCONTINUED | OUTPATIENT
Start: 2021-04-21 | End: 2021-04-21

## 2021-04-21 RX ORDER — HYDROMORPHONE HYDROCHLORIDE 1 MG/ML
0.4 INJECTION, SOLUTION INTRAMUSCULAR; INTRAVENOUS; SUBCUTANEOUS EVERY 5 MIN PRN
Status: DISCONTINUED | OUTPATIENT
Start: 2021-04-21 | End: 2021-04-21

## 2021-04-21 RX ADMIN — SODIUM CHLORIDE, SODIUM LACTATE, POTASSIUM CHLORIDE, CALCIUM CHLORIDE: 600; 310; 30; 20 INJECTION, SOLUTION INTRAVENOUS at 13:45:00

## 2021-04-21 RX ADMIN — PHENYLEPHRINE HCL 200 MCG: 10 MG/ML VIAL (ML) INJECTION at 14:01:00

## 2021-04-21 RX ADMIN — LIDOCAINE HYDROCHLORIDE 25 MG: 10 INJECTION, SOLUTION EPIDURAL; INFILTRATION; INTRACAUDAL; PERINEURAL at 13:46:00

## 2021-04-21 RX ADMIN — PHENYLEPHRINE HCL 100 MCG: 10 MG/ML VIAL (ML) INJECTION at 13:57:00

## 2021-04-21 RX ADMIN — PHENYLEPHRINE HCL 100 MCG: 10 MG/ML VIAL (ML) INJECTION at 13:53:00

## 2021-04-21 RX ADMIN — PHENYLEPHRINE HCL 200 MCG: 10 MG/ML VIAL (ML) INJECTION at 14:07:00

## 2021-04-21 RX ADMIN — PHENYLEPHRINE HCL 100 MCG: 10 MG/ML VIAL (ML) INJECTION at 13:51:00

## 2021-04-21 RX ADMIN — SODIUM CHLORIDE, SODIUM LACTATE, POTASSIUM CHLORIDE, CALCIUM CHLORIDE: 600; 310; 30; 20 INJECTION, SOLUTION INTRAVENOUS at 14:15:00

## 2021-04-21 NOTE — OPERATIVE REPORT
[address] Patient Status:  Hospital Outpatient Surgery    3/27/1955 MRN [id number]   Location [id number] [place] [address] Attending Phillip Hinkle MD   Hosp Day # 0 PCP Blanca Magana MD, Ramon Zhou MD         PATIENT NAME: Nov the entire examined colon was otherwise normal. After retroflexion in the rectum, the colonoscope was straightened and removed and the procedure was completed. The patient tolerated the procedure well.  There were no implants placed nor significant blood lo

## 2021-04-21 NOTE — ANESTHESIA PREPROCEDURE EVALUATION
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     Anesthetic Complications           GI/Hepatic/Renal discussed with: patient and significant other                Present on Admission:  **None**

## 2021-04-21 NOTE — H&P
[facility] GASTROENTEROLOGY    REFERRING PHYSICIAN: Dr. Billy Kathleen is a 77year old male.   GARCIA cirrhosis varices screening CRC screening Hx polyps    See note reviewed from 3/22/21    PROCEDURE: EGD colon    Allergies: Amoxici Colonoscopy EGD    I have discussed the risks and benefits and alternatives with the patient/family. They understand and agree to proceed with the plan of care. I have reviewed the History and Physical performed.   I have examined this patient today and

## 2021-04-22 NOTE — PROGRESS NOTES
Here are the biopsy/pathology findings from your recent Colonoscopy : The colon polyps removed were benign (adenoma), but precancerous. A repeat colonoscopy exam in 3 years is recommended. Left message with results on private voicemail.   Sent to My

## 2021-06-02 ENCOUNTER — HOSPITAL ENCOUNTER (OUTPATIENT)
Dept: ULTRASOUND IMAGING | Facility: HOSPITAL | Age: 66
Discharge: HOME | End: 2021-06-02
Attending: INTERNAL MEDICINE
Payer: MEDICARE

## 2021-06-08 ENCOUNTER — HOSPITAL ENCOUNTER (OUTPATIENT)
Dept: ULTRASOUND IMAGING | Facility: HOSPITAL | Age: 66
Discharge: HOME | End: 2021-06-08
Attending: INTERNAL MEDICINE
Payer: COMMERCIAL

## 2021-06-08 VITALS — HEIGHT: 66 IN | WEIGHT: 267.38 LBS | BODY MASS INDEX: 42.97 KG/M2

## 2021-06-08 DIAGNOSIS — K75.81 LIVER CIRRHOSIS SECONDARY TO NASH (HCC): ICD-10-CM

## 2021-06-08 DIAGNOSIS — K74.60 LIVER CIRRHOSIS SECONDARY TO NASH (HCC): ICD-10-CM

## 2021-06-08 PROCEDURE — 49083 ABD PARACENTESIS W/IMAGING: CPT | Performed by: INTERNAL MEDICINE

## 2021-06-08 PROCEDURE — 85027 COMPLETE CBC AUTOMATED: CPT | Performed by: CLINICAL NURSE SPECIALIST

## 2021-06-08 PROCEDURE — 85610 PROTHROMBIN TIME: CPT | Performed by: CLINICAL NURSE SPECIALIST

## 2021-06-08 PROCEDURE — P9047 ALBUMIN (HUMAN), 25%, 50ML: HCPCS

## 2021-06-08 PROCEDURE — 36415 COLL VENOUS BLD VENIPUNCTURE: CPT | Performed by: CLINICAL NURSE SPECIALIST

## 2021-06-08 RX ORDER — ALBUMIN (HUMAN) 12.5 G/50ML
SOLUTION INTRAVENOUS
Status: DISCONTINUED
Start: 2021-06-08 | End: 2021-06-09

## 2021-06-08 RX ORDER — ALBUMIN (HUMAN) 12.5 G/50ML
400 SOLUTION INTRAVENOUS ONCE
Status: COMPLETED | OUTPATIENT
Start: 2021-06-08 | End: 2021-06-08

## 2021-06-08 RX ADMIN — ALBUMIN (HUMAN) 400 ML: 12.5 SOLUTION INTRAVENOUS at 13:21:00

## 2021-06-08 NOTE — IMAGING NOTE
2958 Pt to ultrasound room scouts taken by [id number]    Hx taken procedure explained questions answered     [phone] Patient consented. Pt to get albumin 25% 25 grams yes    DR MARIAELENA ORTIZ   Here to access- scanning completed and reviewed.       PLATELETS

## 2021-06-28 ENCOUNTER — HOSPITAL ENCOUNTER (OUTPATIENT)
Dept: ULTRASOUND IMAGING | Facility: HOSPITAL | Age: 66
Discharge: HOME | End: 2021-06-28
Attending: INTERNAL MEDICINE
Payer: COMMERCIAL

## 2021-06-28 VITALS — BODY MASS INDEX: 43.39 KG/M2 | WEIGHT: 270 LBS | HEIGHT: 66 IN

## 2021-06-28 DIAGNOSIS — K74.60 LIVER CIRRHOSIS SECONDARY TO NASH (NONALCOHOLIC STEATOHEPATITIS) (HCC): ICD-10-CM

## 2021-06-28 DIAGNOSIS — K72.90 HEPATIC ENCEPHALOPATHY (HCC): ICD-10-CM

## 2021-06-28 DIAGNOSIS — K75.81 LIVER CIRRHOSIS SECONDARY TO NASH (NONALCOHOLIC STEATOHEPATITIS) (HCC): ICD-10-CM

## 2021-06-28 LAB
DEPRECATED RDW RBC AUTO: 49.2 FL (ref 35.1–46.3)
ERYTHROCYTE [DISTWIDTH] IN BLOOD BY AUTOMATED COUNT: 14.7 % (ref 11–15)
HCT VFR BLD AUTO: 36.1 %
HGB BLD-MCNC: 11.2 G/DL
INR BLD: 1.04 (ref 0.9–1.2)
MCH RBC QN AUTO: 28.4 PG (ref 26–34)
MCHC RBC AUTO-ENTMCNC: 31 G/DL (ref 31–37)
MCV RBC AUTO: 91.6 FL
PLATELET # BLD AUTO: 95 10(3)UL (ref 150–450)
PROTHROMBIN TIME: 13.4 SECONDS (ref 11.8–14.5)
RBC # BLD AUTO: 3.94 X10(6)UL
WBC # BLD AUTO: 4 X10(3) UL (ref 4–11)

## 2021-06-28 PROCEDURE — P9047 ALBUMIN (HUMAN), 25%, 50ML: HCPCS

## 2021-06-28 PROCEDURE — 85610 PROTHROMBIN TIME: CPT | Performed by: CLINICAL NURSE SPECIALIST

## 2021-06-28 PROCEDURE — 36415 COLL VENOUS BLD VENIPUNCTURE: CPT | Performed by: CLINICAL NURSE SPECIALIST

## 2021-06-28 PROCEDURE — 49083 ABD PARACENTESIS W/IMAGING: CPT | Performed by: INTERNAL MEDICINE

## 2021-06-28 PROCEDURE — 85027 COMPLETE CBC AUTOMATED: CPT | Performed by: CLINICAL NURSE SPECIALIST

## 2021-06-28 RX ORDER — ALBUMIN (HUMAN) 12.5 G/50ML
SOLUTION INTRAVENOUS
Status: DISCONTINUED
Start: 2021-06-28 | End: 2021-06-29

## 2021-06-28 RX ORDER — ALBUMIN (HUMAN) 12.5 G/50ML
300 SOLUTION INTRAVENOUS ONCE
Status: COMPLETED | OUTPATIENT
Start: 2021-06-28 | End: 2021-06-28

## 2021-06-28 RX ADMIN — ALBUMIN (HUMAN) 300 ML: 12.5 SOLUTION INTRAVENOUS at 13:13:00

## 2021-06-28 NOTE — IMAGING NOTE
1250 Pt to ultrasound room scouts taken by A JR RT    R PIV 20 WITH HEP LOCK FOR ALBUMIN INFUSION- RIGHT WRIST    HR 71 PO2 SAT 95%, /64    Hx taken procedure explained questions answered     1303 Patient consented.     Pt to get albumin 25% 25

## 2021-08-19 ENCOUNTER — HOSPITAL ENCOUNTER (OUTPATIENT)
Dept: ULTRASOUND IMAGING | Facility: HOSPITAL | Age: 66
Discharge: HOME | End: 2021-08-19
Attending: INTERNAL MEDICINE
Payer: COMMERCIAL

## 2021-08-19 VITALS — BODY MASS INDEX: 43.39 KG/M2 | WEIGHT: 270 LBS | HEIGHT: 66 IN

## 2021-08-19 DIAGNOSIS — R18.8 OTHER ASCITES: ICD-10-CM

## 2021-08-19 LAB
ALBUMIN FLD-MCNC: 0.8 G/DL
BASOPHILS NFR PRT: 0 %
COLOR FLD: YELLOW
DEPRECATED RDW RBC AUTO: 50.7 FL (ref 35.1–46.3)
EOSINOPHIL NFR PRT: 0 %
ERYTHROCYTE [DISTWIDTH] IN BLOOD BY AUTOMATED COUNT: 15.3 % (ref 11–15)
HCT VFR BLD AUTO: 33.1 %
HGB BLD-MCNC: 10.5 G/DL
INR BLD: 1.11 (ref 0.9–1.2)
LYMPHOCYTES NFR PRT: 24 %
MCH RBC QN AUTO: 28.8 PG (ref 26–34)
MCHC RBC AUTO-ENTMCNC: 31.7 G/DL (ref 31–37)
MCV RBC AUTO: 90.9 FL
MONOS+MACROS NFR PRT: 73 %
NEUTROPHILS NFR FLD: 3 %
PLATELET # BLD AUTO: 85 10(3)UL (ref 150–450)
PROT PRT-MCNC: 1.8 G/DL
PROTHROMBIN TIME: 14.1 SECONDS (ref 11.8–14.5)
RBC # BLD AUTO: 3.64 X10(6)UL
RBC # FLD: 1878 /CUMM (ref ?–1)
TOTAL CELLS COUNTED: 100
TURBIDITY CSF QL: CLEAR
WBC # BLD AUTO: 3.9 X10(3) UL (ref 4–11)
WBC # FLD: 71 /CUMM (ref ?–1)

## 2021-08-19 PROCEDURE — 49083 ABD PARACENTESIS W/IMAGING: CPT | Performed by: INTERNAL MEDICINE

## 2021-08-19 PROCEDURE — 85610 PROTHROMBIN TIME: CPT | Performed by: CLINICAL NURSE SPECIALIST

## 2021-08-19 PROCEDURE — 85027 COMPLETE CBC AUTOMATED: CPT | Performed by: CLINICAL NURSE SPECIALIST

## 2021-08-19 PROCEDURE — P9047 ALBUMIN (HUMAN), 25%, 50ML: HCPCS

## 2021-08-19 PROCEDURE — 89051 BODY FLUID CELL COUNT: CPT | Performed by: INTERNAL MEDICINE

## 2021-08-19 PROCEDURE — 82042 OTHER SOURCE ALBUMIN QUAN EA: CPT | Performed by: INTERNAL MEDICINE

## 2021-08-19 PROCEDURE — 87070 CULTURE OTHR SPECIMN AEROBIC: CPT | Performed by: INTERNAL MEDICINE

## 2021-08-19 PROCEDURE — 89050 BODY FLUID CELL COUNT: CPT | Performed by: INTERNAL MEDICINE

## 2021-08-19 PROCEDURE — 84157 ASSAY OF PROTEIN OTHER: CPT | Performed by: INTERNAL MEDICINE

## 2021-08-19 PROCEDURE — 36415 COLL VENOUS BLD VENIPUNCTURE: CPT | Performed by: CLINICAL NURSE SPECIALIST

## 2021-08-19 PROCEDURE — 87205 SMEAR GRAM STAIN: CPT | Performed by: INTERNAL MEDICINE

## 2021-08-19 RX ORDER — ALBUMIN (HUMAN) 12.5 G/50ML
100 SOLUTION INTRAVENOUS AS NEEDED
Status: DISCONTINUED | OUTPATIENT
Start: 2021-08-19 | End: 2021-08-21

## 2021-08-19 RX ORDER — ALBUMIN (HUMAN) 12.5 G/50ML
SOLUTION INTRAVENOUS
Status: DISCONTINUED
Start: 2021-08-19 | End: 2021-08-19 | Stop reason: WASHOUT

## 2021-08-19 RX ORDER — ALBUMIN (HUMAN) 12.5 G/50ML
SOLUTION INTRAVENOUS
Status: DISCONTINUED
Start: 2021-08-19 | End: 2021-08-20

## 2021-08-19 RX ORDER — ALBUMIN (HUMAN) 12.5 G/50ML
300 SOLUTION INTRAVENOUS AS NEEDED
Status: DISCONTINUED | OUTPATIENT
Start: 2021-08-19 | End: 2021-08-21

## 2021-08-19 RX ADMIN — ALBUMIN (HUMAN) 300 ML: 12.5 SOLUTION INTRAVENOUS at 13:43:00

## 2021-08-19 NOTE — IMAGING NOTE
1257 Pt to ultrasound room scouts taken by MARIAELENA BURTON RT    1304 HR 79 /74 PO2 SAT 99%    Hx taken procedure explained questions answered     1320 Patient consented.     Pt to get albumin 25% 25 grams yes      Keyonna KELLY  Here to access- scanning com

## 2021-09-27 ENCOUNTER — OFFICE VISIT (OUTPATIENT)
Dept: INTERNAL MEDICINE CLINIC | Facility: CLINIC | Age: 66
End: 2021-09-27
Payer: COMMERCIAL

## 2021-09-27 VITALS
RESPIRATION RATE: 16 BRPM | WEIGHT: 273.81 LBS | HEIGHT: 66 IN | DIASTOLIC BLOOD PRESSURE: 67 MMHG | SYSTOLIC BLOOD PRESSURE: 108 MMHG | HEART RATE: 78 BPM | BODY MASS INDEX: 44.01 KG/M2

## 2021-09-27 DIAGNOSIS — E66.01 CLASS 3 SEVERE OBESITY DUE TO EXCESS CALORIES WITH SERIOUS COMORBIDITY AND BODY MASS INDEX (BMI) OF 40.0 TO 44.9 IN ADULT (HCC): ICD-10-CM

## 2021-09-27 DIAGNOSIS — G47.39 OTHER SLEEP APNEA: ICD-10-CM

## 2021-09-27 DIAGNOSIS — D61.818 PANCYTOPENIA (HCC): Primary | ICD-10-CM

## 2021-09-27 DIAGNOSIS — F41.9 ANXIETY: ICD-10-CM

## 2021-09-27 DIAGNOSIS — F90.0 ATTENTION DEFICIT HYPERACTIVITY DISORDER (ADHD), PREDOMINANTLY INATTENTIVE TYPE: ICD-10-CM

## 2021-09-27 PROCEDURE — 99203 OFFICE O/P NEW LOW 30 MIN: CPT | Performed by: INTERNAL MEDICINE

## 2021-09-27 RX ORDER — AMOXICILLIN 500 MG/1
500 CAPSULE ORAL
COMMUNITY
End: 2021-12-07

## 2021-09-27 RX ORDER — LOPERAMIDE HYDROCHLORIDE 2 MG/1
2 TABLET ORAL 4 TIMES DAILY PRN
COMMUNITY
End: 2021-12-07

## 2021-09-27 RX ORDER — FUROSEMIDE 80 MG
TABLET ORAL
COMMUNITY
Start: 2021-09-07

## 2021-09-27 RX ORDER — FLUTICASONE FUROATE AND VILANTEROL TRIFENATATE 100; 25 UG/1; UG/1
1 POWDER RESPIRATORY (INHALATION) DAILY
COMMUNITY
End: 2021-12-07

## 2021-09-27 RX ORDER — LACTULOSE 10 G/15ML
SOLUTION ORAL; RECTAL
Qty: 946 ML | Refills: 3 | Status: SHIPPED | OUTPATIENT
Start: 2021-09-27

## 2021-09-27 RX ORDER — MELOXICAM 15 MG/1
15 TABLET ORAL DAILY
COMMUNITY
End: 2021-12-07

## 2021-09-27 RX ORDER — DIPHENHYDRAMINE HCL 25 MG
25 TABLET ORAL EVERY 6 HOURS PRN
COMMUNITY
End: 2021-12-07

## 2021-09-28 NOTE — PROGRESS NOTES
Subjective:   Patient ID: Bryanna Ha is a 77year old male.   Presents as a new patient to establish care, chronic conditions    HPI  Patient reports that main problem he has etc. liver cirrhosis he is working with a gastroenterologist.  Eugenio Epstein • nadolol 20 MG Oral Tab Take 1 tablet (20 mg total) by mouth daily. 30 tablet 11   • omeprazole 20 MG Oral Capsule Delayed Release Take 20 mg by mouth As Directed. • Clobetasol Propionate 0.05 % External Liquid Apply 125 mL topically As Directed. Reflexes normal.   Psychiatric:         Mood and Affect: Mood normal.         Behavior: Behavior normal.         Thought Content:  Thought content normal.         Assessment & Plan:   Pancytopenia ([address]  (primary encounter diagnosis) recheck CBC  Anxiety cl

## 2021-09-30 PROCEDURE — 3044F HG A1C LEVEL LT 7.0%: CPT | Performed by: INTERNAL MEDICINE

## 2021-10-04 ENCOUNTER — APPOINTMENT (OUTPATIENT)
Dept: GENERAL RADIOLOGY | Facility: HOSPITAL | Age: 66
End: 2021-10-04
Attending: EMERGENCY MEDICINE
Payer: COMMERCIAL

## 2021-10-04 ENCOUNTER — IMMUNIZATION (OUTPATIENT)
Dept: LAB | Facility: HOSPITAL | Age: 66
End: 2021-10-04
Attending: EMERGENCY MEDICINE
Payer: COMMERCIAL

## 2021-10-04 ENCOUNTER — APPOINTMENT (OUTPATIENT)
Dept: CT IMAGING | Facility: HOSPITAL | Age: 66
End: 2021-10-04
Attending: EMERGENCY MEDICINE
Payer: COMMERCIAL

## 2021-10-04 ENCOUNTER — HOSPITAL ENCOUNTER (EMERGENCY)
Facility: HOSPITAL | Age: 66
Discharge: HOME | End: 2021-10-04
Payer: COMMERCIAL

## 2021-10-04 ENCOUNTER — DOCUMENTATION ONLY (OUTPATIENT)
Dept: FAMILY MEDICINE CLINIC | Facility: CLINIC | Age: 66
End: 2021-10-04

## 2021-10-04 ENCOUNTER — APPOINTMENT (OUTPATIENT)
Dept: GENERAL RADIOLOGY | Facility: HOSPITAL | Age: 66
End: 2021-10-04
Payer: COMMERCIAL

## 2021-10-04 VITALS
WEIGHT: 270 LBS | BODY MASS INDEX: 43.39 KG/M2 | RESPIRATION RATE: 18 BRPM | OXYGEN SATURATION: 98 % | TEMPERATURE: 98 F | SYSTOLIC BLOOD PRESSURE: 110 MMHG | HEIGHT: 66 IN | DIASTOLIC BLOOD PRESSURE: 60 MMHG | HEART RATE: 70 BPM

## 2021-10-04 DIAGNOSIS — S60.511A HAND ABRASION, RIGHT, INITIAL ENCOUNTER: ICD-10-CM

## 2021-10-04 DIAGNOSIS — R07.81 RIB PAIN ON LEFT SIDE: ICD-10-CM
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
## 2021-10-04 DIAGNOSIS — Z23 NEED FOR VACCINATION: Primary | ICD-10-CM

## 2021-10-04 DIAGNOSIS — S00.93XA CONTUSION OF HEAD, UNSPECIFIED PART OF HEAD, INITIAL ENCOUNTER: Primary | ICD-10-CM

## 2021-10-04 DIAGNOSIS — R18.8 OTHER ASCITES: ICD-10-CM

## 2021-10-04 DIAGNOSIS — S20.212A CONTUSION OF RIB ON LEFT SIDE, INITIAL ENCOUNTER: ICD-10-CM

## 2021-10-04 DIAGNOSIS — S09.90XA TRAUMATIC INJURY OF HEAD, INITIAL ENCOUNTER: ICD-10-CM

## 2021-10-04 PROCEDURE — 0003A SARSCOV2 VAC 30MCG/0.3ML IM: CPT

## 2021-10-04 PROCEDURE — 71101 X-RAY EXAM UNILAT RIBS/CHEST: CPT | Performed by: EMERGENCY MEDICINE

## 2021-10-04 PROCEDURE — 70450 CT HEAD/BRAIN W/O DYE: CPT | Performed by: EMERGENCY MEDICINE

## 2021-10-04 PROCEDURE — 99284 EMERGENCY DEPT VISIT MOD MDM: CPT

## 2021-10-04 NOTE — ED INITIAL ASSESSMENT (HPI)
Mechanical fall at clinic at around 1100. Denies LOC. Denies c spine tenderness on palpation. C/o pain to left side of forehead, left side of chest. Pain improved with standing straight.

## 2021-10-04 NOTE — PROGRESS NOTES
Of note vitals upon assessing patient below:  BP left - 117/80  Pulse 72  O2-98  Lungs- CTAB  Cardio-RRR  Remaining exam in note.

## 2021-10-04 NOTE — PROGRESS NOTES
Was called by staff at the [facility] vaccination site to evaluate a patient that fell outside of the building. Promptly went to room 1 where patient was sitting comfortably in wheelchair with ice pack on his head. Asked patient what happened.   Pa he  will enter not go with ambulance and have his wife or son take him to the hospital.   Patient okay with me cleaning his forehead and finger abrasions. Cleaned with wound care cleanser bacitracin and Band-Aids applied.   Patient called his wife and son f

## 2021-10-07 ENCOUNTER — PATIENT MESSAGE (OUTPATIENT)
Dept: INTERNAL MEDICINE CLINIC | Facility: CLINIC | Age: 66
End: 2021-10-07

## 2021-10-07 NOTE — ED PROVIDER NOTES
Patient Seen in: [facility] AND [facility] Emergency Department    History   Patient presents with:  Fall    Stated Complaint: Mechanical fall- trauma to head, rigth hand, and left rib area, ambulance turk*    HPI    Patient here with complaint of head injury. Fluticasone Furoate-Vilanterol (BREO ELLIPTA) 100-25 MCG/INH Inhalation Aerosol Powder, Breath Activated,  Inhale 1 puff into the lungs daily. amoxicillin 500 MG Oral Cap,  Take 500 mg by mouth. Only before dentist appt.  Due to hip replacement   juan HPI.    Physical Exam     ED Triage Vitals   BP 10/04/21 1342 94/63   Pulse 10/04/21 1342 68   Resp 10/04/21 1347 16   Temp 10/04/21 1342 97.5 °F (36.4 °C)   Temp src 10/04/21 1342 Temporal   SpO2 10/04/21 1342 95 %   O2 Device 10/04/21 1342 None (Room air Finalized by (CST): Katelynn Bustos MD on 10/04/2021 at 2:53 PM          XR RIBS WITH CHEST (3 VIEWS), LEFT  (CPT=71101)    Result Date: 10/4/2021  CONCLUSION:   Negative for acute displaced left rib fracture.    No evidence for acute cardiopulmonary proces

## 2021-10-08 NOTE — TELEPHONE ENCOUNTER
From: Bryanna Ha  To: Rey Cantu MD  Sent: 10/7/2021 12:28 PM CDT  Subject: Updates for MyChart - part 1    Dr. Myla Fiore, attached are some items which happened in the last week, which I'm not sure of how to add to [address]   1) I had a Covid-19

## 2021-10-08 NOTE — TELEPHONE ENCOUNTER
Routed to Dr Pilar English for advise, thanks. Pt immunization record is now updated        No future appointments.

## 2021-10-18 ENCOUNTER — PATIENT MESSAGE (OUTPATIENT)
Dept: INTERNAL MEDICINE CLINIC | Facility: CLINIC | Age: 66
End: 2021-10-18

## 2021-10-19 NOTE — TELEPHONE ENCOUNTER
Pended adderall with correct pharmacy      From: Soren Hall  To: Jack Michelle MD  Sent: 10/18/2021 11:34 PM CDT  Subject: Prescription refill    Dr. Selena Alonzo, I need a refill of Adderal XR 30mg. Last fill was 9/17/2021 (by Dr. Kathleen High).   I have supp

## 2021-10-20 RX ORDER — DEXTROAMPHETAMINE SACCHARATE, AMPHETAMINE ASPARTATE MONOHYDRATE, DEXTROAMPHETAMINE SULFATE AND AMPHETAMINE SULFATE 7.5; 7.5; 7.5; 7.5 MG/1; MG/1; MG/1; MG/1
30 CAPSULE, EXTENDED RELEASE ORAL DAILY
Qty: 30 CAPSULE | Refills: 0 | Status: SHIPPED | OUTPATIENT
Start: 2021-10-20 | End: 2021-11-23

## 2021-10-27 ENCOUNTER — TELEPHONE (OUTPATIENT)
Dept: INTERNAL MEDICINE CLINIC | Facility: CLINIC | Age: 66
End: 2021-10-27

## 2021-10-28 NOTE — TELEPHONE ENCOUNTER
Spoke to patient, reviewed recent CBC, hemoglobin 9.9, platelets 049 done at [facility].  Will mail patient a copy, referred patient to see hematology for failure evaluation of the problem and possible treatment.

## 2021-11-10 ENCOUNTER — TELEPHONE (OUTPATIENT)
Dept: INTERNAL MEDICINE CLINIC | Facility: CLINIC | Age: 66
End: 2021-11-10

## 2021-11-10 NOTE — TELEPHONE ENCOUNTER
Trudy Mueller from Dr. Vijay Head office is requesting lab information on the patient. Stated patient went to [facility] and can't access those records in the patients chart.  Patients appointment is scheduled with Dr. Vijay Head on 11/11    [phone]

## 2021-11-11 ENCOUNTER — OFFICE VISIT (OUTPATIENT)
Dept: HEMATOLOGY/ONCOLOGY | Facility: HOSPITAL | Age: 66
End: 2021-11-11
Attending: STUDENT IN AN ORGANIZED HEALTH CARE EDUCATION/TRAINING PROGRAM
Payer: COMMERCIAL

## 2021-11-11 VITALS
SYSTOLIC BLOOD PRESSURE: 113 MMHG | RESPIRATION RATE: 16 BRPM | WEIGHT: 267 LBS | BODY MASS INDEX: 42.91 KG/M2 | TEMPERATURE: 98 F | OXYGEN SATURATION: 96 % | HEIGHT: 66 IN | DIASTOLIC BLOOD PRESSURE: 63 MMHG | HEART RATE: 76 BPM

## 2021-11-11 DIAGNOSIS — K75.81 LIVER CIRRHOSIS SECONDARY TO NASH (HCC): ICD-10-CM

## 2021-11-11 DIAGNOSIS — D64.9 ANEMIA, UNSPECIFIED TYPE: Primary | ICD-10-CM

## 2021-11-11 DIAGNOSIS — K74.60 LIVER CIRRHOSIS SECONDARY TO NASH (HCC): ICD-10-CM

## 2021-11-11 DIAGNOSIS — D64.9 ANEMIA, UNSPECIFIED TYPE: ICD-10-CM

## 2021-11-11 DIAGNOSIS — D69.6 THROMBOCYTOPENIA (HCC): ICD-10-CM

## 2021-11-11 PROCEDURE — 85060 BLOOD SMEAR INTERPRETATION: CPT

## 2021-11-11 PROCEDURE — 82607 VITAMIN B-12: CPT

## 2021-11-11 PROCEDURE — 85025 COMPLETE CBC W/AUTO DIFF WBC: CPT

## 2021-11-11 PROCEDURE — 36415 COLL VENOUS BLD VENIPUNCTURE: CPT

## 2021-11-11 PROCEDURE — 84466 ASSAY OF TRANSFERRIN: CPT

## 2021-11-11 PROCEDURE — 80053 COMPREHEN METABOLIC PANEL: CPT

## 2021-11-11 PROCEDURE — 82746 ASSAY OF FOLIC ACID SERUM: CPT

## 2021-11-11 PROCEDURE — 99244 OFF/OP CNSLTJ NEW/EST MOD 40: CPT | Performed by: STUDENT IN AN ORGANIZED HEALTH CARE EDUCATION/TRAINING PROGRAM

## 2021-11-11 PROCEDURE — 83540 ASSAY OF IRON: CPT

## 2021-11-11 PROCEDURE — 87389 HIV-1 AG W/HIV-1&-2 AB AG IA: CPT

## 2021-11-11 PROCEDURE — 82728 ASSAY OF FERRITIN: CPT

## 2021-11-11 NOTE — CONSULTS
[address]  Initial Hematology Clinic Consult Note    Patient Name: Vandana Chappell   YOB: 1955   Medical Record Number: [id number]  Referring Physician(s): No ref.  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 Only before dentist appt.  Due to hip replacement, Disp: , Rfl:   lactulose (GENERLAC) 10 GM/15ML Oral Solution, TAKE 30ML BY MOUTH TWICE DAILY, Disp: 946 mL, Rfl: 3  hydrocortisone 2.5 % External Cream, 1 yonis - Face, Disp Resource Strain: Not on file  Food Insecurity: Not on file  Transportation Needs: Not on file  Physical Activity: Not on file  Stress: Not on file  Social Connections: Not on file  Intimate Partner Violence: Not on file  Housing Stability: Not on file    O hypertension and subsequent hepatosplenomegaly. He has no sequelae of bleeding. We will obtain a peripheral blood smear, CBC, CMP, iron studies, ferritin, HIV, folate/B12. Hepatitis screen was negative last year thus we will hold off on checking this.

## 2021-11-11 NOTE — TELEPHONE ENCOUNTER
Called and spoke to CLAYTON to verify which labs were the ones that were needed. CLAYTON stated patient had copy of labs which he brought in to his appointment with Dr. Clara Neff.

## 2021-11-13 ENCOUNTER — TELEPHONE (OUTPATIENT)
Dept: INTERNAL MEDICINE CLINIC | Facility: CLINIC | Age: 66
End: 2021-11-13

## 2021-11-13 NOTE — TELEPHONE ENCOUNTER
Patient's wife Abhilash Rivera is requesting to schedule follow up visit to discuss anemia. She declined to schedule for the first available in December. can we use a RES or TCM slot?

## 2021-11-15 ENCOUNTER — TELEPHONE (OUTPATIENT)
Dept: HEMATOLOGY/ONCOLOGY | Facility: HOSPITAL | Age: 66
End: 2021-11-15

## 2021-11-15 RX ORDER — FERROUS SULFATE 325(65) MG
325 TABLET ORAL
Qty: 90 TABLET | Refills: 1 | Status: SHIPPED | OUTPATIENT
Start: 2021-11-15

## 2021-11-15 NOTE — TELEPHONE ENCOUNTER
Called Mr. Brendan Gibson and reviewed his labs. Iron studies consistent with mild NO, other labs work rather unremarkable. Had EGD/colo in April 2021 that did not have any concerning findings.  We will trial PO iron, pt educated to administer once daily and if not

## 2021-11-20 ENCOUNTER — PATIENT MESSAGE (OUTPATIENT)
Dept: INTERNAL MEDICINE CLINIC | Facility: CLINIC | Age: 66
End: 2021-11-20

## 2021-11-22 NOTE — TELEPHONE ENCOUNTER
From: Marielena Gonzalez  To: Juliette Lan MD  Sent: 11/20/2021 4:58 PM CST  Subject: Omeprazole 20mg capsules    I also need a new prescription (with refills) for Omeprazole 20mg capsules, qty 30, last filled on 10/11/21.  The previous prescription was i

## 2021-11-22 NOTE — TELEPHONE ENCOUNTER
From: Kristyn Akins  To: Jaqui Ko MD  Sent: 11/20/2021 4:51 PM CST  Subject: Laneta Quiet    We have an upcoming appointment next Friday, 11/26. However, I'm going to run out of Adderall XR 30mg on Tuesday 11/23/21 (last prescription was 10/20/21).  C

## 2021-11-23 RX ORDER — DEXTROAMPHETAMINE SACCHARATE, AMPHETAMINE ASPARTATE MONOHYDRATE, DEXTROAMPHETAMINE SULFATE AND AMPHETAMINE SULFATE 7.5; 7.5; 7.5; 7.5 MG/1; MG/1; MG/1; MG/1
30 CAPSULE, EXTENDED RELEASE ORAL DAILY
Qty: 30 CAPSULE | Refills: 0 | Status: SHIPPED | OUTPATIENT
Start: 2021-11-23 | End: 2021-12-20

## 2021-11-23 RX ORDER — OMEPRAZOLE 20 MG/1
20 CAPSULE, DELAYED RELEASE ORAL EVERY MORNING
Qty: 90 CAPSULE | Refills: 1 | Status: SHIPPED | OUTPATIENT
Start: 2021-11-23

## 2021-12-07 ENCOUNTER — OFFICE VISIT (OUTPATIENT)
Dept: INTERNAL MEDICINE CLINIC | Facility: CLINIC | Age: 66
End: 2021-12-07
Payer: COMMERCIAL

## 2021-12-07 VITALS
HEIGHT: 66 IN | SYSTOLIC BLOOD PRESSURE: 101 MMHG | BODY MASS INDEX: 40.66 KG/M2 | WEIGHT: 253 LBS | HEART RATE: 76 BPM | DIASTOLIC BLOOD PRESSURE: 65 MMHG | RESPIRATION RATE: 22 BRPM

## 2021-12-07 DIAGNOSIS — D50.8 OTHER IRON DEFICIENCY ANEMIA: Primary | ICD-10-CM

## 2021-12-07 DIAGNOSIS — F90.0 ATTENTION DEFICIT HYPERACTIVITY DISORDER (ADHD), PREDOMINANTLY INATTENTIVE TYPE: ICD-10-CM

## 2021-12-07 PROBLEM — M25.551 PAIN OF RIGHT HIP JOINT: Status: RESOLVED | Noted: 2018-08-23 | Resolved: 2021-12-07

## 2021-12-07 PROBLEM — M70.61 TROCHANTERIC BURSITIS OF RIGHT HIP: Status: RESOLVED | Noted: 2018-08-23 | Resolved: 2021-12-07

## 2021-12-07 PROCEDURE — 3074F SYST BP LT 130 MM HG: CPT | Performed by: INTERNAL MEDICINE

## 2021-12-07 PROCEDURE — 99213 OFFICE O/P EST LOW 20 MIN: CPT | Performed by: INTERNAL MEDICINE

## 2021-12-07 PROCEDURE — 3008F BODY MASS INDEX DOCD: CPT | Performed by: INTERNAL MEDICINE

## 2021-12-07 PROCEDURE — 3078F DIAST BP <80 MM HG: CPT | Performed by: INTERNAL MEDICINE

## 2021-12-12 NOTE — PROGRESS NOTES
Subjective:   Patient ID: Barbara Hampton is a 77year old male.   Presents for follow-up on iron deficiency anemia, ADHD    HPI  Patient reports that since he started taking iron supplementation after seeing hematologist feeling better, gastroenterolog Resp 22   Ht 5' 6\" (1.676 m)   Wt 253 lb (114.8 kg)   BMI 40.84 kg/m²   Physical Exam  Vitals reviewed. Constitutional:       Appearance: He is obese. Cardiovascular:      Rate and Rhythm: Normal rate and regular rhythm.       Heart sounds: No murmur

## 2021-12-17 ENCOUNTER — TELEMEDICINE (OUTPATIENT)
Dept: INTERNAL MEDICINE CLINIC | Facility: CLINIC | Age: 66
End: 2021-12-17

## 2021-12-17 ENCOUNTER — NURSE TRIAGE (OUTPATIENT)
Dept: INTERNAL MEDICINE CLINIC | Facility: CLINIC | Age: 66
End: 2021-12-17

## 2021-12-17 ENCOUNTER — PATIENT MESSAGE (OUTPATIENT)
Dept: INTERNAL MEDICINE CLINIC | Facility: CLINIC | Age: 66
End: 2021-12-17

## 2021-12-17 ENCOUNTER — LAB ENCOUNTER (OUTPATIENT)
Dept: LAB | Age: 66
End: 2021-12-17
Attending: INTERNAL MEDICINE
Payer: COMMERCIAL

## 2021-12-17 DIAGNOSIS — K74.69 OTHER CIRRHOSIS OF LIVER (HCC): ICD-10-CM

## 2021-12-17 DIAGNOSIS — R53.83 FATIGUE, UNSPECIFIED TYPE: ICD-10-CM

## 2021-12-17 DIAGNOSIS — R18.8 OTHER ASCITES: ICD-10-CM

## 2021-12-17 DIAGNOSIS — R53.83 FATIGUE, UNSPECIFIED TYPE: Primary | ICD-10-CM

## 2021-12-17 PROCEDURE — 82140 ASSAY OF AMMONIA: CPT

## 2021-12-17 PROCEDURE — 36415 COLL VENOUS BLD VENIPUNCTURE: CPT

## 2021-12-17 PROCEDURE — 99214 OFFICE O/P EST MOD 30 MIN: CPT | Performed by: INTERNAL MEDICINE

## 2021-12-17 PROCEDURE — 84443 ASSAY THYROID STIM HORMONE: CPT

## 2021-12-17 PROCEDURE — 84439 ASSAY OF FREE THYROXINE: CPT

## 2021-12-17 PROCEDURE — 80053 COMPREHEN METABOLIC PANEL: CPT

## 2021-12-17 NOTE — TELEPHONE ENCOUNTER
Contacted  through secure chat. Dr. Jennifer Kearns advises that patient schedule a video appointment with an available internal medicine provider in the clinic.     Video appointment scheduled with Bijan Sanford at 11:30 am.  Patient advised to complete

## 2021-12-17 NOTE — PROGRESS NOTES
This is a telemedicine visit with live, interactive video and audio. Patient understands and accepts financial responsibility for any deductible, co-insurance and/or co-pays associated with this service.     SUBJECTIVE  He scheduled video visit today wi Family History   Problem Relation Age of Onset   • Cancer Father         MELANOMA   • Other (Other) Mother         strokes   • Other (Other) Daughter         ASTHMA   • Other (Other) Son         ASTHMA      Social History    Tobacco Use      Smoking stat -I did explain to him it can be due to anemia plus  underlying liver disease/I did advise him to recheck his blood there is already a standing order from GI to check for anemia, will check his thyroid function, will check BMP for any electrolyte disbalance

## 2021-12-17 NOTE — TELEPHONE ENCOUNTER
Action Requested: Summary for Provider     []  Critical Lab, Recommendations Needed  [x] Need Additional Advice  []   FYI    []   Need Orders  [] Need Medications Sent to Pharmacy  []  Other     SUMMARY: Patient's spouse called with patient siting next t

## 2021-12-17 NOTE — TELEPHONE ENCOUNTER
Spoke with pt,  verified  Pt was informed of MD recommendation, pt stated understanding.       FYI        Future Appointments   Date Time Provider Tico Enciso   2021 11:30 AM Salazar Barba MD [facility]   2021  2:00 PM Laureano Riley

## 2021-12-17 NOTE — TELEPHONE ENCOUNTER
From: Maryann Kerns  To: Willy Lebron MD  Sent: 12/17/2021 11:24 AM CST  Subject: Attachments    Supplemental material for today's video visit.

## 2021-12-18 NOTE — PROGRESS NOTES
Ammonia level a little high. If feeling symptomatic need to make sure taking Lactulose with 2-3 BMs daily. If taking Lactulose, will need to add Xifaxan antibiotic    Left message with results on private voicemail.   Sent to [employer]

## 2021-12-20 ENCOUNTER — PATIENT MESSAGE (OUTPATIENT)
Dept: INTERNAL MEDICINE CLINIC | Facility: CLINIC | Age: 66
End: 2021-12-20

## 2021-12-20 RX ORDER — DEXTROAMPHETAMINE SACCHARATE, AMPHETAMINE ASPARTATE MONOHYDRATE, DEXTROAMPHETAMINE SULFATE AND AMPHETAMINE SULFATE 7.5; 7.5; 7.5; 7.5 MG/1; MG/1; MG/1; MG/1
30 CAPSULE, EXTENDED RELEASE ORAL DAILY
Qty: 30 CAPSULE | Refills: 0 | Status: SHIPPED | OUTPATIENT
Start: 2021-12-20

## 2021-12-21 NOTE — TELEPHONE ENCOUNTER
From: Bryanna Ha  Sent: 12/20/2021 11:14 AM CST  To: Em Rn Triage  Subject: Wayne Concepcion    Per our discussion at my appointment on 12/7/21, I have sufficient Adderall XR 30mg thru this Friday, 12/24/2021; after that, I'm out (last prescription was Tue

## 2022-01-05 ENCOUNTER — PATIENT MESSAGE (OUTPATIENT)
Dept: INTERNAL MEDICINE CLINIC | Facility: CLINIC | Age: 67
End: 2022-01-05

## 2022-01-05 LAB — AMB EXT COVID-19 RESULT: DETECTED

## 2022-01-06 NOTE — TELEPHONE ENCOUNTER
From: Garry Knapp  To: Claude Glen, MD  Sent: 1/5/2022 7:14 PM CST  Subject: Covid-19 rapid-test POSITIVE result    This is just a heads-up FYI message. I just received a Covid-19 rapid-test POSITIVE result (as of 5:15pm Wed 1/5/2022).  [facility]

## 2022-01-07 ENCOUNTER — TELEPHONE (OUTPATIENT)
Dept: INTERNAL MEDICINE CLINIC | Facility: CLINIC | Age: 67
End: 2022-01-07

## 2022-01-08 NOTE — TELEPHONE ENCOUNTER
Dr. Candis Yepez only, still awaiting PCR results.      Also See:   1/7/22 Patient Message  1/5/22 Patient Message            From: Giacomo Romero  Sent: 1/8/2022  3:04 AM CST  To: Em Rn Triage  Subject: refill    1) Dr. Amol Ureña, thanks for the Generic

## 2022-01-10 ENCOUNTER — TELEPHONE (OUTPATIENT)
Dept: INTERNAL MEDICINE CLINIC | Facility: CLINIC | Age: 67
End: 2022-01-10

## 2022-01-10 NOTE — TELEPHONE ENCOUNTER
Spoke with pt,  verified. Pt informed he could have a false positive rapid covid test.  Pt was informed  PCR is more accurate testing vs rapid test.  Pt stated he's been quarantine at home since 1-5, he had a little fatigue but that could be from something else. Pt denies no sx, he's been double masking for better protection. Pt was advised to continue to follow CDC guidelines, mask, social distancing, hand washing. Pt stated understanding.        SARA

## 2022-01-10 NOTE — TELEPHONE ENCOUNTER
Patient states he tested positive on a rapid COVID test last week, that same day he completed a PCR test and received results today and results came back negative. Patient would like to discuss results and requesting a call. Please advise.

## 2022-01-15 ENCOUNTER — PATIENT MESSAGE (OUTPATIENT)
Dept: INTERNAL MEDICINE CLINIC | Facility: CLINIC | Age: 67
End: 2022-01-15

## 2022-01-17 ENCOUNTER — LAB ENCOUNTER (OUTPATIENT)
Dept: LAB | Age: 67
End: 2022-01-17
Attending: INTERNAL MEDICINE
Payer: COMMERCIAL

## 2022-01-17 ENCOUNTER — TELEPHONE (OUTPATIENT)
Dept: INTERNAL MEDICINE CLINIC | Facility: CLINIC | Age: 67
End: 2022-01-17

## 2022-01-17 ENCOUNTER — PATIENT MESSAGE (OUTPATIENT)
Dept: INTERNAL MEDICINE CLINIC | Facility: CLINIC | Age: 67
End: 2022-01-17

## 2022-01-17 ENCOUNTER — LAB ENCOUNTER (OUTPATIENT)
Dept: LAB | Facility: HOSPITAL | Age: 67
End: 2022-01-17
Attending: INTERNAL MEDICINE
Payer: COMMERCIAL

## 2022-01-17 DIAGNOSIS — E03.8 OTHER SPECIFIED HYPOTHYROIDISM: ICD-10-CM

## 2022-01-17 DIAGNOSIS — R19.7 DIARRHEA, UNSPECIFIED TYPE: ICD-10-CM

## 2022-01-17 DIAGNOSIS — R19.7 DIARRHEA, UNSPECIFIED TYPE: Primary | ICD-10-CM

## 2022-01-17 DIAGNOSIS — K72.90 HEPATIC ENCEPHALOPATHY (HCC): ICD-10-CM

## 2022-01-17 LAB
AFP-TM SERPL-MCNC: 1.9 NG/ML (ref ?–8)
AMMONIA PLAS-MCNC: 34 UMOL/L (ref 11–32)
ANION GAP SERPL CALC-SCNC: 4 MMOL/L (ref 0–18)
BUN BLD-MCNC: 16 MG/DL (ref 7–18)
BUN/CREAT SERPL: 14 (ref 10–20)
CALCIUM BLD-MCNC: 8.5 MG/DL (ref 8.5–10.1)
CHLORIDE SERPL-SCNC: 105 MMOL/L (ref 98–112)
CO2 SERPL-SCNC: 31 MMOL/L (ref 21–32)
CREAT BLD-MCNC: 1.14 MG/DL
FASTING STATUS PATIENT QL REPORTED: NO
GLUCOSE BLD-MCNC: 249 MG/DL (ref 70–99)
OSMOLALITY SERPL CALC.SUM OF ELEC: 300 MOSM/KG (ref 275–295)
POTASSIUM SERPL-SCNC: 4.2 MMOL/L (ref 3.5–5.1)
SODIUM SERPL-SCNC: 140 MMOL/L (ref 136–145)
TSI SER-ACNC: 2.72 MIU/ML (ref 0.36–3.74)

## 2022-01-17 PROCEDURE — 82105 ALPHA-FETOPROTEIN SERUM: CPT

## 2022-01-17 PROCEDURE — 80048 BASIC METABOLIC PNL TOTAL CA: CPT

## 2022-01-17 PROCEDURE — 82140 ASSAY OF AMMONIA: CPT

## 2022-01-17 PROCEDURE — 36415 COLL VENOUS BLD VENIPUNCTURE: CPT

## 2022-01-17 PROCEDURE — 84443 ASSAY THYROID STIM HORMONE: CPT

## 2022-01-17 NOTE — TELEPHONE ENCOUNTER
Spouse advised of recommendations, agreed with plan. Patient is currently waiting to get his Covid test at lab, she will have them do blood test also.

## 2022-01-17 NOTE — TELEPHONE ENCOUNTER
Roxana Ramos RN 1/17/2022 10:11 AM CST        ----- Message -----  From: Raisa White  Sent: 1/15/2022 8:46 PM CST  To: Em Rn Triage  Subject: Test results adjustments     1) My last two test results 1/5/22 and 1/10/22 need 1 modification.  The on

## 2022-01-17 NOTE — TELEPHONE ENCOUNTER
Please call wife for patient was sent him email I recommend that he does blood test BMP make sure he does not have electrolyte disbalance having diarrhea and taking furosemide 80 mg daily order in the system he can do it on the same day as he will get COVI

## 2022-01-17 NOTE — TELEPHONE ENCOUNTER
Pt's wife calling , stated Pt retested for Covid for a birthday party , had previous test at location but concern test is inadequate again as before , rapid neg/PCR +, before Rapid was + and PCR Neg   Pt only has diarrhea but that's normal since he has asc

## 2022-01-18 NOTE — TELEPHONE ENCOUNTER
From: Enriquetaace Kena  Sent: 1/17/2022 4:05 PM CST  To: Em Rn Triage  Subject: Test results adjustments    Swetha/Dr. Danny Epstein Attached are my two test results swabbed last Friday, 1/14.  Antigen Rapid Test came out Negative-Normal. I got the PCR jcarlos

## 2022-01-18 NOTE — PROGRESS NOTES
Ammonia is better, almost normal.  Tumor marker ok. Electrolytes normal.    1.  Check ammonia again in 2 weeks. Ordered  2. Continue Amiloride 10 mg twice daily, Lasix 80 mg twice daily. 4.  Cont Lactulose 20 ml twice daily, Nadolol.     Sent to [employer]

## 2022-01-19 LAB — SARS-COV-2 RNA RESP QL NAA+PROBE: NOT DETECTED

## 2022-01-25 ENCOUNTER — PATIENT MESSAGE (OUTPATIENT)
Dept: INTERNAL MEDICINE CLINIC | Facility: CLINIC | Age: 67
End: 2022-01-25

## 2022-01-25 RX ORDER — DEXTROAMPHETAMINE SACCHARATE, AMPHETAMINE ASPARTATE MONOHYDRATE, DEXTROAMPHETAMINE SULFATE AND AMPHETAMINE SULFATE 7.5; 7.5; 7.5; 7.5 MG/1; MG/1; MG/1; MG/1
30 CAPSULE, EXTENDED RELEASE ORAL DAILY
Qty: 30 CAPSULE | Refills: 0 | Status: SHIPPED | OUTPATIENT
Start: 2022-01-25 | End: 2022-02-24

## 2022-01-25 RX ORDER — DEXTROAMPHETAMINE SACCHARATE, AMPHETAMINE ASPARTATE MONOHYDRATE, DEXTROAMPHETAMINE SULFATE AND AMPHETAMINE SULFATE 7.5; 7.5; 7.5; 7.5 MG/1; MG/1; MG/1; MG/1
30 CAPSULE, EXTENDED RELEASE ORAL DAILY
Qty: 30 CAPSULE | Refills: 0 | Status: SHIPPED | OUTPATIENT
Start: 2022-02-25 | End: 2022-03-27

## 2022-01-25 RX ORDER — DEXTROAMPHETAMINE SACCHARATE, AMPHETAMINE ASPARTATE MONOHYDRATE, DEXTROAMPHETAMINE SULFATE AND AMPHETAMINE SULFATE 7.5; 7.5; 7.5; 7.5 MG/1; MG/1; MG/1; MG/1
30 CAPSULE, EXTENDED RELEASE ORAL DAILY
Qty: 30 CAPSULE | Refills: 0 | Status: SHIPPED | OUTPATIENT
Start: 2022-03-28 | End: 2022-04-27

## 2022-01-25 NOTE — TELEPHONE ENCOUNTER
From: Dandre Morrison  To: Melida Litten, MD  Sent: 1/25/2022 12:07 AM CST  Subject: Need a new Adderal XR 30mg Rx    I need a new Adderal XR 30mg Prescription called into the [facility] - LUCIA Silveira (Leslee Rosas). Thank you.

## 2022-02-07 ENCOUNTER — OFFICE VISIT (OUTPATIENT)
Dept: SURGERY | Facility: CLINIC | Age: 67
End: 2022-02-07
Payer: COMMERCIAL

## 2022-02-07 VITALS
WEIGHT: 266.19 LBS | DIASTOLIC BLOOD PRESSURE: 77 MMHG | HEART RATE: 76 BPM | BODY MASS INDEX: 43 KG/M2 | OXYGEN SATURATION: 95 % | SYSTOLIC BLOOD PRESSURE: 122 MMHG

## 2022-02-07 DIAGNOSIS — K75.81 LIVER CIRRHOSIS SECONDARY TO NASH (HCC): Primary | ICD-10-CM

## 2022-02-07 DIAGNOSIS — K74.60 LIVER CIRRHOSIS SECONDARY TO NASH (HCC): Primary | ICD-10-CM

## 2022-02-12 ENCOUNTER — LAB ENCOUNTER (OUTPATIENT)
Dept: LAB | Age: 67
End: 2022-02-12
Attending: INTERNAL MEDICINE
Payer: COMMERCIAL

## 2022-02-12 DIAGNOSIS — D50.8 OTHER IRON DEFICIENCY ANEMIA: ICD-10-CM

## 2022-02-12 DIAGNOSIS — D64.9 ANEMIA, UNSPECIFIED TYPE: ICD-10-CM

## 2022-02-12 DIAGNOSIS — K72.90 HEPATIC ENCEPHALOPATHY (HCC): ICD-10-CM

## 2022-02-12 DIAGNOSIS — D69.6 THROMBOCYTOPENIA (HCC): ICD-10-CM

## 2022-02-12 DIAGNOSIS — K75.81 LIVER CIRRHOSIS SECONDARY TO NASH (HCC): ICD-10-CM

## 2022-02-12 DIAGNOSIS — K74.60 LIVER CIRRHOSIS SECONDARY TO NASH (HCC): ICD-10-CM

## 2022-02-12 DIAGNOSIS — K74.60 CIRRHOSIS OF LIVER WITHOUT ASCITES, UNSPECIFIED HEPATIC CIRRHOSIS TYPE (HCC): ICD-10-CM

## 2022-02-12 LAB
ALBUMIN SERPL-MCNC: 3 G/DL (ref 3.4–5)
ALBUMIN/GLOB SERPL: 0.6 {RATIO} (ref 1–2)
ALP LIVER SERPL-CCNC: 111 U/L
ALT SERPL-CCNC: 37 U/L
AMMONIA PLAS-MCNC: 65 UMOL/L (ref 11–32)
ANION GAP SERPL CALC-SCNC: 2 MMOL/L (ref 0–18)
AST SERPL-CCNC: 33 U/L (ref 15–37)
BASOPHILS # BLD AUTO: 0.02 X10(3) UL (ref 0–0.2)
BASOPHILS NFR BLD AUTO: 0.5 %
BILIRUB SERPL-MCNC: 0.7 MG/DL (ref 0.1–2)
BUN BLD-MCNC: 21 MG/DL (ref 7–18)
BUN/CREAT SERPL: 19.8 (ref 10–20)
CALCIUM BLD-MCNC: 8.8 MG/DL (ref 8.5–10.1)
CHLORIDE SERPL-SCNC: 104 MMOL/L (ref 98–112)
CO2 SERPL-SCNC: 33 MMOL/L (ref 21–32)
CREAT BLD-MCNC: 1.06 MG/DL
DEPRECATED HBV CORE AB SER IA-ACNC: 65.4 NG/ML
DEPRECATED RDW RBC AUTO: 51.1 FL (ref 35.1–46.3)
EOSINOPHIL # BLD AUTO: 0.27 X10(3) UL (ref 0–0.7)
EOSINOPHIL NFR BLD AUTO: 6.8 %
ERYTHROCYTE [DISTWIDTH] IN BLOOD BY AUTOMATED COUNT: 14.9 % (ref 11–15)
FASTING STATUS PATIENT QL REPORTED: YES
GLOBULIN PLAS-MCNC: 4.7 G/DL (ref 2.8–4.4)
GLUCOSE BLD-MCNC: 126 MG/DL (ref 70–99)
HCT VFR BLD AUTO: 38.9 %
HGB BLD-MCNC: 12.6 G/DL
IMM GRANULOCYTES # BLD AUTO: 0.01 X10(3) UL (ref 0–1)
IMM GRANULOCYTES NFR BLD: 0.3 %
IRON SATN MFR SERPL: 16 %
IRON SERPL-MCNC: 59 UG/DL
LYMPHOCYTES # BLD AUTO: 0.52 X10(3) UL (ref 1–4)
LYMPHOCYTES NFR BLD AUTO: 13 %
MCH RBC QN AUTO: 30.5 PG (ref 26–34)
MCHC RBC AUTO-ENTMCNC: 32.4 G/DL (ref 31–37)
MCV RBC AUTO: 94.2 FL
MONOCYTES # BLD AUTO: 0.48 X10(3) UL (ref 0.1–1)
MONOCYTES NFR BLD AUTO: 12 %
NEUTROPHILS # BLD AUTO: 2.7 X10 (3) UL (ref 1.5–7.7)
NEUTROPHILS # BLD AUTO: 2.7 X10(3) UL (ref 1.5–7.7)
NEUTROPHILS NFR BLD AUTO: 67.4 %
OSMOLALITY SERPL CALC.SUM OF ELEC: 293 MOSM/KG (ref 275–295)
PLATELET # BLD AUTO: 95 10(3)UL (ref 150–450)
POTASSIUM SERPL-SCNC: 4.3 MMOL/L (ref 3.5–5.1)
PROT SERPL-MCNC: 7.7 G/DL (ref 6.4–8.2)
RBC # BLD AUTO: 4.13 X10(6)UL
SODIUM SERPL-SCNC: 139 MMOL/L (ref 136–145)
TIBC SERPL-MCNC: 378 UG/DL (ref 240–450)
TRANSFERRIN SERPL-MCNC: 254 MG/DL (ref 200–360)
WBC # BLD AUTO: 4 X10(3) UL (ref 4–11)

## 2022-02-12 PROCEDURE — 36415 COLL VENOUS BLD VENIPUNCTURE: CPT

## 2022-02-12 PROCEDURE — 82728 ASSAY OF FERRITIN: CPT

## 2022-02-12 PROCEDURE — 83540 ASSAY OF IRON: CPT

## 2022-02-12 PROCEDURE — 85025 COMPLETE CBC W/AUTO DIFF WBC: CPT

## 2022-02-12 PROCEDURE — 82140 ASSAY OF AMMONIA: CPT

## 2022-02-12 PROCEDURE — 80053 COMPREHEN METABOLIC PANEL: CPT

## 2022-02-12 PROCEDURE — 84466 ASSAY OF TRANSFERRIN: CPT

## 2022-02-13 NOTE — PROGRESS NOTES
Ammonia level high again. Will need to add Xifaxan antibiotic 550 mg twice daily if agreeable. Please let patient know.   Sent to [address]

## 2022-02-22 RX ORDER — DEXTROAMPHETAMINE SACCHARATE, AMPHETAMINE ASPARTATE MONOHYDRATE, DEXTROAMPHETAMINE SULFATE AND AMPHETAMINE SULFATE 7.5; 7.5; 7.5; 7.5 MG/1; MG/1; MG/1; MG/1
30 CAPSULE, EXTENDED RELEASE ORAL DAILY
Qty: 30 CAPSULE | Refills: 0 | Status: SHIPPED | OUTPATIENT
Start: 2022-02-22 | End: 2022-03-24

## 2022-02-22 NOTE — TELEPHONE ENCOUNTER
Please review. Protocol failed/No protocol      Requested Prescriptions   Pending Prescriptions Disp Refills    amphetamine-dextroamphetamine (ADDERALL XR) 30 MG Oral Capsule SR 24 Hr 30 capsule 0     Sig: Take 1 capsule (30 mg total) by mouth daily.         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 [facility])    [address] [place] [address] Marley Moon MD    Office Visit    1 month ago Hepatic encephalopathy [facility])    Gastroenterology - Lindsey Dickinson MD    Office Visit    2 months ago Fatigue, unspecified type    [facility], [address] [place] [address] Indy Klein MD    Telemedicine    2 months ago Other iron deficiency anemia    Curvin Mole, Corinne Horseman, [place], MD    Office Visit    3 months ago 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

## 2022-02-24 ENCOUNTER — LAB ENCOUNTER (OUTPATIENT)
Dept: LAB | Age: 67
End: 2022-02-24
Attending: INTERNAL MEDICINE
Payer: COMMERCIAL

## 2022-02-24 DIAGNOSIS — K72.90 HEPATIC ENCEPHALOPATHY (HCC): ICD-10-CM

## 2022-02-24 DIAGNOSIS — R73.03 PREDIABETES: ICD-10-CM

## 2022-02-24 LAB
AMMONIA PLAS-MCNC: 27 UMOL/L (ref 11–32)
EST. AVERAGE GLUCOSE BLD GHB EST-MCNC: 146 MG/DL (ref 68–126)
HBA1C MFR BLD: 6.7 % (ref ?–5.7)

## 2022-02-24 PROCEDURE — 3044F HG A1C LEVEL LT 7.0%: CPT | Performed by: INTERNAL MEDICINE

## 2022-02-24 PROCEDURE — 36415 COLL VENOUS BLD VENIPUNCTURE: CPT

## 2022-02-24 PROCEDURE — 82140 ASSAY OF AMMONIA: CPT

## 2022-02-24 PROCEDURE — 83036 HEMOGLOBIN GLYCOSYLATED A1C: CPT

## 2022-02-25 NOTE — PROGRESS NOTES
Ammonia now normal.    HbA1c ok as well, a little higher than 6 months ago. Needs to follow this up with PCP. Should have started Xifaxan in addition to Lactulose. Repeat labs in 2 weeks. Ordered.     Sent to [address]

## 2022-03-09 ENCOUNTER — LAB ENCOUNTER (OUTPATIENT)
Dept: LAB | Age: 67
End: 2022-03-09
Attending: INTERNAL MEDICINE
Payer: COMMERCIAL

## 2022-03-09 DIAGNOSIS — K72.90 HEPATIC ENCEPHALOPATHY (HCC): ICD-10-CM

## 2022-03-09 LAB — AMMONIA PLAS-MCNC: 47 UMOL/L (ref 11–32)

## 2022-03-09 PROCEDURE — 36415 COLL VENOUS BLD VENIPUNCTURE: CPT

## 2022-03-09 PROCEDURE — 82140 ASSAY OF AMMONIA: CPT

## 2022-03-10 NOTE — PROGRESS NOTES
Ammonia a little higher. 1.  Continue Lactulose and Xifaxan. Refills sent  2. Repeat labs 2 weeks. Ordered    Results given to patient.   Sent to [address]

## 2022-04-01 ENCOUNTER — TELEPHONE (OUTPATIENT)
Dept: INTERNAL MEDICINE CLINIC | Facility: CLINIC | Age: 67
End: 2022-04-01

## 2022-04-01 NOTE — TELEPHONE ENCOUNTER
Patients wife called for a refill request for the following medication. Patient is completely out.        amphetamine-dextroamphetamine (ADDERALL XR) 30 MG

## 2022-04-07 ENCOUNTER — LAB ENCOUNTER (OUTPATIENT)
Dept: LAB | Age: 67
End: 2022-04-07
Attending: INTERNAL MEDICINE
Payer: COMMERCIAL

## 2022-04-07 ENCOUNTER — TELEPHONE (OUTPATIENT)
Dept: INTERNAL MEDICINE CLINIC | Facility: CLINIC | Age: 67
End: 2022-04-07

## 2022-04-07 DIAGNOSIS — K72.90 HEPATIC ENCEPHALOPATHY (HCC): ICD-10-CM

## 2022-04-07 DIAGNOSIS — K75.81 LIVER CIRRHOSIS SECONDARY TO NASH (HCC): ICD-10-CM

## 2022-04-07 DIAGNOSIS — K74.60 LIVER CIRRHOSIS SECONDARY TO NASH (HCC): ICD-10-CM

## 2022-04-07 LAB
ALBUMIN SERPL-MCNC: 3.3 G/DL (ref 3.4–5)
ALBUMIN/GLOB SERPL: 0.8 {RATIO} (ref 1–2)
ALP LIVER SERPL-CCNC: 125 U/L
ALT SERPL-CCNC: 55 U/L
AMMONIA PLAS-MCNC: 52 UMOL/L (ref 11–32)
ANION GAP SERPL CALC-SCNC: 5 MMOL/L (ref 0–18)
AST SERPL-CCNC: 54 U/L (ref 15–37)
BILIRUB SERPL-MCNC: 1 MG/DL (ref 0.1–2)
BUN BLD-MCNC: 17 MG/DL (ref 7–18)
BUN/CREAT SERPL: 14.8 (ref 10–20)
CALCIUM BLD-MCNC: 9.1 MG/DL (ref 8.5–10.1)
CHLORIDE SERPL-SCNC: 102 MMOL/L (ref 98–112)
CO2 SERPL-SCNC: 32 MMOL/L (ref 21–32)
CREAT BLD-MCNC: 1.15 MG/DL
FASTING STATUS PATIENT QL REPORTED: YES
GLOBULIN PLAS-MCNC: 4.4 G/DL (ref 2.8–4.4)
GLUCOSE BLD-MCNC: 133 MG/DL (ref 70–99)
INR BLD: 1.1 (ref 0.8–1.2)
OSMOLALITY SERPL CALC.SUM OF ELEC: 291 MOSM/KG (ref 275–295)
POTASSIUM SERPL-SCNC: 4 MMOL/L (ref 3.5–5.1)
PROT SERPL-MCNC: 7.7 G/DL (ref 6.4–8.2)
PROTHROMBIN TIME: 14.3 SECONDS (ref 11.6–14.8)
SODIUM SERPL-SCNC: 139 MMOL/L (ref 136–145)

## 2022-04-07 PROCEDURE — 82140 ASSAY OF AMMONIA: CPT

## 2022-04-07 PROCEDURE — 85610 PROTHROMBIN TIME: CPT

## 2022-04-07 PROCEDURE — 80053 COMPREHEN METABOLIC PANEL: CPT

## 2022-04-07 PROCEDURE — 36415 COLL VENOUS BLD VENIPUNCTURE: CPT

## 2022-04-07 NOTE — TELEPHONE ENCOUNTER
Per patient he is at the lab already and would like to know if dr Lorie Pandya can order his Ferritin repeated. Please advise.

## 2022-04-13 ENCOUNTER — HOSPITAL ENCOUNTER (OUTPATIENT)
Dept: ULTRASOUND IMAGING | Facility: HOSPITAL | Age: 67
Discharge: HOME | End: 2022-04-13
Attending: INTERNAL MEDICINE
Payer: COMMERCIAL

## 2022-04-13 VITALS
HEIGHT: 66 IN | HEART RATE: 81 BPM | RESPIRATION RATE: 16 BRPM | DIASTOLIC BLOOD PRESSURE: 79 MMHG | SYSTOLIC BLOOD PRESSURE: 145 MMHG | WEIGHT: 255 LBS | BODY MASS INDEX: 40.98 KG/M2

## 2022-04-13 DIAGNOSIS — K72.90 HEPATIC ENCEPHALOPATHY (HCC): ICD-10-CM

## 2022-04-13 DIAGNOSIS — K74.60 LIVER CIRRHOSIS SECONDARY TO NASH (HCC): ICD-10-CM

## 2022-04-13 DIAGNOSIS — K75.81 LIVER CIRRHOSIS SECONDARY TO NASH (HCC): ICD-10-CM

## 2022-04-13 LAB — PLATELET # BLD AUTO: 85 10(3)UL (ref 150–450)

## 2022-04-13 PROCEDURE — 85049 AUTOMATED PLATELET COUNT: CPT | Performed by: CLINICAL NURSE SPECIALIST

## 2022-04-13 PROCEDURE — 76705 ECHO EXAM OF ABDOMEN: CPT | Performed by: INTERNAL MEDICINE

## 2022-04-13 PROCEDURE — 49083 ABD PARACENTESIS W/IMAGING: CPT | Performed by: INTERNAL MEDICINE

## 2022-04-13 PROCEDURE — 36415 COLL VENOUS BLD VENIPUNCTURE: CPT | Performed by: CLINICAL NURSE SPECIALIST

## 2022-04-13 PROCEDURE — P9047 ALBUMIN (HUMAN), 25%, 50ML: HCPCS

## 2022-04-13 RX ORDER — ALBUMIN (HUMAN) 12.5 G/50ML
100 SOLUTION INTRAVENOUS AS NEEDED
Status: DISCONTINUED | OUTPATIENT
Start: 2022-04-13 | End: 2022-04-15

## 2022-04-13 RX ORDER — ALBUMIN (HUMAN) 12.5 G/50ML
SOLUTION INTRAVENOUS
Status: DISPENSED
Start: 2022-04-13 | End: 2022-04-13

## 2022-04-13 RX ADMIN — ALBUMIN (HUMAN) 100 ML: 12.5 SOLUTION INTRAVENOUS at 08:49:00

## 2022-04-13 NOTE — IMAGING NOTE
0750Pt to ultrasound room scouts taken by nidia Qlibri tech    0755Hx taken procedure explained questions answered     0800 Patient consented. Pt to get albumin 25% 25 grams yes      0813 M SABINE KELLY  Here to access- scanning completed and reviewed. PLATELETS = 85     PT 14.3     INR= 1.1    0814 Timeout taken    0815 Chloro prep  as skin prep sterile drape applied lidocaine 1% 10 milligrams per ml from kit was given for anesthetic affect 5 ml total given. Incision made with scalpel.    0821 5 Maltese  10 Yoruba 10 cm yueh catheter placed LEFT LOWER  abdomen    Fluid aspirated for labs  YES OR NO     (IF CYTOLOGY FLUID NEEDED --COLLECT 1 LITER OF FLUID IN VACUUM BOTTLE) PER PATHOLOGY    0821 Catheter connected  to tubing then connected to [place] [facility]TL Direct to [facility]. Draining begins continuously via  [place] [facility]TL System. 0827 VS /66  HR 79  PULSE OX ON ROOM AIR 95% IV STARTED IN RIGHT HAND 22 G     0835  VS /71  HR 79  PULSE OX ON ROOM AIR 95%    0850 VS /75  HR 80  PULSE OX ON ROOM AIR 95 %    ALBUMIN BOTTLE #1 STARTED     0850 VS /75  HR 80  PULSE OX ON ROOM AIR 95 %     0905 Draining completed. VS /75  HR 83  PULSE OX ON ROOM AIR 96% ALBUMIN #1 ENDED , IV DISCONTINUED     Patient re scanned. total amount drained 3201 ml. Drain was dc'd. Pressure to site for 5 minutes. Area was cleaned steri strips to site. Gauze and tegaderm      0910 Post instructions was given verbally and written  after visit summary sheet provided to patient.     6581 pt having a US of her Liver after the paracentesis then discharged     0915  [employer] system was cleaned after procedure by Corona Antony RN

## 2022-04-18 ENCOUNTER — OFFICE VISIT (OUTPATIENT)
Dept: SURGERY | Facility: CLINIC | Age: 67
End: 2022-04-18
Payer: COMMERCIAL

## 2022-04-18 VITALS
WEIGHT: 262.38 LBS | OXYGEN SATURATION: 96 % | TEMPERATURE: 97 F | DIASTOLIC BLOOD PRESSURE: 80 MMHG | HEART RATE: 81 BPM | SYSTOLIC BLOOD PRESSURE: 130 MMHG | BODY MASS INDEX: 42 KG/M2

## 2022-04-18 DIAGNOSIS — K74.60 LIVER CIRRHOSIS SECONDARY TO NASH (HCC): Primary | ICD-10-CM

## 2022-04-18 DIAGNOSIS — K75.81 LIVER CIRRHOSIS SECONDARY TO NASH (HCC): Primary | ICD-10-CM

## 2022-04-28 ENCOUNTER — LAB ENCOUNTER (OUTPATIENT)
Dept: LAB | Age: 67
End: 2022-04-28
Attending: INTERNAL MEDICINE
Payer: COMMERCIAL

## 2022-04-28 DIAGNOSIS — R73.03 PREDIABETES: ICD-10-CM

## 2022-04-28 DIAGNOSIS — K72.90 HEPATIC ENCEPHALOPATHY (HCC): ICD-10-CM

## 2022-04-28 DIAGNOSIS — D50.8 OTHER IRON DEFICIENCY ANEMIA: ICD-10-CM

## 2022-04-28 LAB
AMMONIA PLAS-MCNC: 34 UMOL/L (ref 11–32)
BASOPHILS # BLD AUTO: 0.02 X10(3) UL (ref 0–0.2)
BASOPHILS NFR BLD AUTO: 0.6 %
DEPRECATED HBV CORE AB SER IA-ACNC: 51.9 NG/ML
DEPRECATED RDW RBC AUTO: 49.7 FL (ref 35.1–46.3)
EOSINOPHIL # BLD AUTO: 0.18 X10(3) UL (ref 0–0.7)
EOSINOPHIL NFR BLD AUTO: 5.3 %
ERYTHROCYTE [DISTWIDTH] IN BLOOD BY AUTOMATED COUNT: 14.4 % (ref 11–15)
EST. AVERAGE GLUCOSE BLD GHB EST-MCNC: 154 MG/DL (ref 68–126)
HBA1C MFR BLD: 7 % (ref ?–5.7)
HCT VFR BLD AUTO: 37.6 %
HGB BLD-MCNC: 12.3 G/DL
IMM GRANULOCYTES # BLD AUTO: 0.01 X10(3) UL (ref 0–1)
IMM GRANULOCYTES NFR BLD: 0.3 %
IRON SATN MFR SERPL: 17 %
IRON SERPL-MCNC: 57 UG/DL
LYMPHOCYTES # BLD AUTO: 0.4 X10(3) UL (ref 1–4)
LYMPHOCYTES NFR BLD AUTO: 11.8 %
MCH RBC QN AUTO: 30.8 PG (ref 26–34)
MCHC RBC AUTO-ENTMCNC: 32.7 G/DL (ref 31–37)
MCV RBC AUTO: 94.2 FL
MONOCYTES # BLD AUTO: 0.38 X10(3) UL (ref 0.1–1)
MONOCYTES NFR BLD AUTO: 11.2 %
NEUTROPHILS # BLD AUTO: 2.41 X10 (3) UL (ref 1.5–7.7)
NEUTROPHILS # BLD AUTO: 2.41 X10(3) UL (ref 1.5–7.7)
NEUTROPHILS NFR BLD AUTO: 70.8 %
PLATELET # BLD AUTO: 74 10(3)UL (ref 150–450)
RBC # BLD AUTO: 3.99 X10(6)UL
TIBC SERPL-MCNC: 338 UG/DL (ref 240–450)
TRANSFERRIN SERPL-MCNC: 227 MG/DL (ref 200–360)
WBC # BLD AUTO: 3.4 X10(3) UL (ref 4–11)

## 2022-04-28 PROCEDURE — 36415 COLL VENOUS BLD VENIPUNCTURE: CPT

## 2022-04-28 PROCEDURE — 84466 ASSAY OF TRANSFERRIN: CPT

## 2022-04-28 PROCEDURE — 3051F HG A1C>EQUAL 7.0%<8.0%: CPT | Performed by: INTERNAL MEDICINE

## 2022-04-28 PROCEDURE — 83540 ASSAY OF IRON: CPT

## 2022-04-28 PROCEDURE — 85025 COMPLETE CBC W/AUTO DIFF WBC: CPT

## 2022-04-28 PROCEDURE — 83036 HEMOGLOBIN GLYCOSYLATED A1C: CPT

## 2022-04-28 PROCEDURE — 82728 ASSAY OF FERRITIN: CPT

## 2022-04-28 PROCEDURE — 82140 ASSAY OF AMMONIA: CPT

## 2022-04-28 NOTE — TELEPHONE ENCOUNTER
Received request for amphetamine-dextroamphetamine (ADDERALL XR) 30 MG Oral Capsule SR 24 Hr. Last fill 12/20/2021 - 30 capsules, 0 refills    Please review pended script and sign if appropriate.

## 2022-04-30 RX ORDER — DEXTROAMPHETAMINE SACCHARATE, AMPHETAMINE ASPARTATE MONOHYDRATE, DEXTROAMPHETAMINE SULFATE AND AMPHETAMINE SULFATE 7.5; 7.5; 7.5; 7.5 MG/1; MG/1; MG/1; MG/1
30 CAPSULE, EXTENDED RELEASE ORAL DAILY
Qty: 30 CAPSULE | Refills: 0 | Status: SHIPPED | OUTPATIENT
Start: 2022-04-30

## 2022-05-02 ENCOUNTER — OFFICE VISIT (OUTPATIENT)
Dept: INTERNAL MEDICINE CLINIC | Facility: CLINIC | Age: 67
End: 2022-05-02
Payer: COMMERCIAL

## 2022-05-02 VITALS
HEIGHT: 67 IN | DIASTOLIC BLOOD PRESSURE: 76 MMHG | BODY MASS INDEX: 41.28 KG/M2 | SYSTOLIC BLOOD PRESSURE: 127 MMHG | WEIGHT: 263 LBS | HEART RATE: 73 BPM

## 2022-05-02 DIAGNOSIS — K74.60 LIVER CIRRHOSIS SECONDARY TO NASH (HCC): ICD-10-CM

## 2022-05-02 DIAGNOSIS — N52.1 ERECTILE DYSFUNCTION DUE TO DISEASES CLASSIFIED ELSEWHERE: ICD-10-CM

## 2022-05-02 DIAGNOSIS — K76.6 PORTAL HYPERTENSION (HCC): ICD-10-CM

## 2022-05-02 DIAGNOSIS — E66.01 CLASS 3 SEVERE OBESITY DUE TO EXCESS CALORIES WITH SERIOUS COMORBIDITY AND BODY MASS INDEX (BMI) OF 40.0 TO 44.9 IN ADULT (HCC): ICD-10-CM

## 2022-05-02 DIAGNOSIS — D61.818 PANCYTOPENIA (HCC): ICD-10-CM

## 2022-05-02 DIAGNOSIS — E61.1 IRON DEFICIENCY: ICD-10-CM

## 2022-05-02 DIAGNOSIS — F90.0 ATTENTION DEFICIT HYPERACTIVITY DISORDER (ADHD), PREDOMINANTLY INATTENTIVE TYPE: ICD-10-CM

## 2022-05-02 DIAGNOSIS — Z00.00 PHYSICAL EXAM, ANNUAL: Primary | ICD-10-CM

## 2022-05-02 DIAGNOSIS — F41.9 ANXIETY: ICD-10-CM

## 2022-05-02 DIAGNOSIS — K75.81 LIVER CIRRHOSIS SECONDARY TO NASH (HCC): ICD-10-CM

## 2022-05-02 DIAGNOSIS — E11.9 TYPE 2 DIABETES MELLITUS WITHOUT COMPLICATION, WITHOUT LONG-TERM CURRENT USE OF INSULIN (HCC): ICD-10-CM

## 2022-05-02 PROCEDURE — 3078F DIAST BP <80 MM HG: CPT | Performed by: INTERNAL MEDICINE

## 2022-05-02 PROCEDURE — 3074F SYST BP LT 130 MM HG: CPT | Performed by: INTERNAL MEDICINE

## 2022-05-02 PROCEDURE — 3008F BODY MASS INDEX DOCD: CPT | Performed by: INTERNAL MEDICINE

## 2022-05-02 PROCEDURE — 99397 PER PM REEVAL EST PAT 65+ YR: CPT | Performed by: INTERNAL MEDICINE

## 2022-05-07 PROBLEM — J84.10 LUNG GRANULOMA (HCC): Status: ACTIVE | Noted: 2022-05-07

## 2022-05-07 PROBLEM — F90.0 ATTENTION DEFICIT HYPERACTIVITY DISORDER (ADHD), PREDOMINANTLY INATTENTIVE TYPE: Status: ACTIVE | Noted: 2022-05-07

## 2022-05-07 PROBLEM — D61.818 PANCYTOPENIA (HCC): Status: ACTIVE | Noted: 2021-11-11

## 2022-05-08 ENCOUNTER — IMMUNIZATION (OUTPATIENT)
Dept: LAB | Age: 67
End: 2022-05-08
Attending: EMERGENCY MEDICINE
Payer: COMMERCIAL

## 2022-05-08 DIAGNOSIS — Z23 NEED FOR VACCINATION: Primary | ICD-10-CM

## 2022-05-08 PROCEDURE — 0054A SARSCOV2 VAC 30MCG TRS SUCR: CPT

## 2022-05-19 RX ORDER — OMEPRAZOLE 20 MG/1
20 CAPSULE, DELAYED RELEASE ORAL EVERY MORNING
Qty: 90 CAPSULE | Refills: 1 | Status: SHIPPED | OUTPATIENT
Start: 2022-05-19

## 2022-05-19 NOTE — TELEPHONE ENCOUNTER
Refill passed per [place] [employer], [facility] protocol.   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

## 2022-05-31 RX ORDER — DEXTROAMPHETAMINE SACCHARATE, AMPHETAMINE ASPARTATE MONOHYDRATE, DEXTROAMPHETAMINE SULFATE AND AMPHETAMINE SULFATE 7.5; 7.5; 7.5; 7.5 MG/1; MG/1; MG/1; MG/1
30 CAPSULE, EXTENDED RELEASE ORAL DAILY
Qty: 30 CAPSULE | Refills: 0 | Status: SHIPPED | OUTPATIENT
Start: 2022-05-31

## 2022-06-01 NOTE — TELEPHONE ENCOUNTER
----- Message from Leonel Sanford RN sent at 5/31/2022  8:34 PM CDT -----  Regarding: FW: Adderal XR 30mg      ----- Message -----  From: Keely Miller  Sent: 5/31/2022   3:49 PM CDT  To: Krysta Rn Triage  Subject: Adderal XR 30mg                                  Dr. Daysi Burdick, my previous Adderal XR 30mg (Qty 30) was filled April 30, and need you to call the pharmacist of the [facility] to authorize a new prescription. Their number is [phone]. Thank you.

## 2022-06-06 ENCOUNTER — TELEPHONE (OUTPATIENT)
Dept: SURGERY | Facility: CLINIC | Age: 67
End: 2022-06-06

## 2022-06-06 ENCOUNTER — OFFICE VISIT (OUTPATIENT)
Dept: SURGERY | Facility: CLINIC | Age: 67
End: 2022-06-06
Payer: COMMERCIAL

## 2022-06-06 VITALS — SYSTOLIC BLOOD PRESSURE: 102 MMHG | HEART RATE: 70 BPM | DIASTOLIC BLOOD PRESSURE: 62 MMHG

## 2022-06-06 DIAGNOSIS — N52.9 ERECTILE DYSFUNCTION, UNSPECIFIED ERECTILE DYSFUNCTION TYPE: Primary | ICD-10-CM

## 2022-06-06 NOTE — TELEPHONE ENCOUNTER
Patient seen in office today. Vacuum therapy prescription form signed by patient and MD.     Faxed form, copy of today's office visit notes, and patients insurance information to number on form. Copy sent to scanning.      FAX # [phone]

## 2022-07-01 RX ORDER — DEXTROAMPHETAMINE SACCHARATE, AMPHETAMINE ASPARTATE MONOHYDRATE, DEXTROAMPHETAMINE SULFATE AND AMPHETAMINE SULFATE 7.5; 7.5; 7.5; 7.5 MG/1; MG/1; MG/1; MG/1
30 CAPSULE, EXTENDED RELEASE ORAL DAILY
Qty: 30 CAPSULE | Refills: 0 | Status: SHIPPED | OUTPATIENT
Start: 2022-07-01

## 2022-07-01 NOTE — TELEPHONE ENCOUNTER
Patient wife calling ( identified name and  ) needs refill on Adderall XR,patient is out of medication      Allergies reviewed and pharmacy confirmed    Med pended for your review / approval

## 2022-07-27 ENCOUNTER — TELEPHONE (OUTPATIENT)
Dept: SURGERY | Facility: CLINIC | Age: 67
End: 2022-07-27

## 2022-07-27 ENCOUNTER — PATIENT MESSAGE (OUTPATIENT)
Dept: INTERNAL MEDICINE CLINIC | Facility: CLINIC | Age: 67
End: 2022-07-27

## 2022-07-27 RX ORDER — DEXTROAMPHETAMINE SACCHARATE, AMPHETAMINE ASPARTATE MONOHYDRATE, DEXTROAMPHETAMINE SULFATE AND AMPHETAMINE SULFATE 7.5; 7.5; 7.5; 7.5 MG/1; MG/1; MG/1; MG/1
30 CAPSULE, EXTENDED RELEASE ORAL DAILY
Qty: 30 CAPSULE | Refills: 0 | Status: SHIPPED | OUTPATIENT
Start: 2022-07-27

## 2022-07-28 NOTE — TELEPHONE ENCOUNTER
From: Nani Haddad  To: Mikey Hernandez MD  Sent: 7/27/2022 3:57 PM CDT  Subject: Adderal XR 30mg    Dr. Alok Alvarado, my previous Adderal XR 30mg (Qty 30) was filled July 1, and I need you to call the pharmacist of the [facility] to authorize a new prescription for me. [facility] number is [phone]. Thank you.

## 2022-07-29 ENCOUNTER — OFFICE VISIT (OUTPATIENT)
Dept: INTERNAL MEDICINE CLINIC | Facility: CLINIC | Age: 67
End: 2022-07-29
Payer: COMMERCIAL

## 2022-07-29 VITALS
SYSTOLIC BLOOD PRESSURE: 121 MMHG | TEMPERATURE: 98 F | RESPIRATION RATE: 16 BRPM | WEIGHT: 271 LBS | HEIGHT: 67 IN | DIASTOLIC BLOOD PRESSURE: 73 MMHG | HEART RATE: 74 BPM | BODY MASS INDEX: 42.53 KG/M2

## 2022-07-29 DIAGNOSIS — D50.8 OTHER IRON DEFICIENCY ANEMIA: ICD-10-CM

## 2022-07-29 DIAGNOSIS — N52.1 ERECTILE DYSFUNCTION DUE TO DISEASES CLASSIFIED ELSEWHERE: Primary | ICD-10-CM

## 2022-07-29 DIAGNOSIS — J84.10 LUNG GRANULOMA (HCC): ICD-10-CM

## 2022-07-29 DIAGNOSIS — E11.9 TYPE 2 DIABETES MELLITUS WITHOUT COMPLICATION, WITHOUT LONG-TERM CURRENT USE OF INSULIN (HCC): ICD-10-CM

## 2022-07-29 DIAGNOSIS — F90.0 ATTENTION DEFICIT HYPERACTIVITY DISORDER (ADHD), PREDOMINANTLY INATTENTIVE TYPE: ICD-10-CM

## 2022-07-29 DIAGNOSIS — K75.81 LIVER CIRRHOSIS SECONDARY TO NASH (HCC): ICD-10-CM

## 2022-07-29 DIAGNOSIS — K74.60 LIVER CIRRHOSIS SECONDARY TO NASH (HCC): ICD-10-CM

## 2022-07-29 PROCEDURE — 99214 OFFICE O/P EST MOD 30 MIN: CPT | Performed by: NURSE PRACTITIONER

## 2022-07-29 PROCEDURE — 3008F BODY MASS INDEX DOCD: CPT | Performed by: NURSE PRACTITIONER

## 2022-07-29 PROCEDURE — 3078F DIAST BP <80 MM HG: CPT | Performed by: NURSE PRACTITIONER

## 2022-07-29 PROCEDURE — 3074F SYST BP LT 130 MM HG: CPT | Performed by: NURSE PRACTITIONER

## 2022-07-30 ENCOUNTER — LAB ENCOUNTER (OUTPATIENT)
Dept: LAB | Age: 67
End: 2022-07-30
Attending: NURSE PRACTITIONER
Payer: COMMERCIAL

## 2022-07-30 DIAGNOSIS — E11.9 TYPE 2 DIABETES MELLITUS WITHOUT COMPLICATION, WITHOUT LONG-TERM CURRENT USE OF INSULIN (HCC): ICD-10-CM

## 2022-07-30 DIAGNOSIS — K75.81 LIVER CIRRHOSIS SECONDARY TO NASH (HCC): ICD-10-CM

## 2022-07-30 DIAGNOSIS — N52.1 ERECTILE DYSFUNCTION DUE TO DISEASES CLASSIFIED ELSEWHERE: ICD-10-CM

## 2022-07-30 DIAGNOSIS — E61.1 IRON DEFICIENCY: ICD-10-CM

## 2022-07-30 DIAGNOSIS — K74.60 LIVER CIRRHOSIS SECONDARY TO NASH (HCC): ICD-10-CM

## 2022-07-30 LAB
ALBUMIN SERPL-MCNC: 3 G/DL (ref 3.4–5)
ALBUMIN/GLOB SERPL: 0.7 {RATIO} (ref 1–2)
ALP LIVER SERPL-CCNC: 91 U/L
ALT SERPL-CCNC: 40 U/L
ANION GAP SERPL CALC-SCNC: 6 MMOL/L (ref 0–18)
AST SERPL-CCNC: 36 U/L (ref 15–37)
BASOPHILS # BLD AUTO: 0.01 X10(3) UL (ref 0–0.2)
BASOPHILS NFR BLD AUTO: 0.2 %
BILIRUB SERPL-MCNC: 0.8 MG/DL (ref 0.1–2)
BUN BLD-MCNC: 19 MG/DL (ref 7–18)
BUN/CREAT SERPL: 16 (ref 10–20)
CALCIUM BLD-MCNC: 9.2 MG/DL (ref 8.5–10.1)
CHLORIDE SERPL-SCNC: 107 MMOL/L (ref 98–112)
CO2 SERPL-SCNC: 30 MMOL/L (ref 21–32)
COMPLEXED PSA SERPL-MCNC: 1.08 NG/ML (ref ?–4)
CREAT BLD-MCNC: 1.19 MG/DL
CREAT UR-SCNC: 42.2 MG/DL
DEPRECATED HBV CORE AB SER IA-ACNC: 82 NG/ML
DEPRECATED RDW RBC AUTO: 48.5 FL (ref 35.1–46.3)
EOSINOPHIL # BLD AUTO: 0.27 X10(3) UL (ref 0–0.7)
EOSINOPHIL NFR BLD AUTO: 6.2 %
ERYTHROCYTE [DISTWIDTH] IN BLOOD BY AUTOMATED COUNT: 13.9 % (ref 11–15)
EST. AVERAGE GLUCOSE BLD GHB EST-MCNC: 160 MG/DL (ref 68–126)
FASTING STATUS PATIENT QL REPORTED: YES
GLOBULIN PLAS-MCNC: 4.5 G/DL (ref 2.8–4.4)
GLUCOSE BLD-MCNC: 134 MG/DL (ref 70–99)
HBA1C MFR BLD: 7.2 % (ref ?–5.7)
HCT VFR BLD AUTO: 42.6 %
HGB BLD-MCNC: 13.6 G/DL
IMM GRANULOCYTES # BLD AUTO: 0.01 X10(3) UL (ref 0–1)
IMM GRANULOCYTES NFR BLD: 0.2 %
IRON SATN MFR SERPL: 25 %
IRON SERPL-MCNC: 75 UG/DL
LYMPHOCYTES # BLD AUTO: 0.45 X10(3) UL (ref 1–4)
LYMPHOCYTES NFR BLD AUTO: 10.3 %
MCH RBC QN AUTO: 30.6 PG (ref 26–34)
MCHC RBC AUTO-ENTMCNC: 31.9 G/DL (ref 31–37)
MCV RBC AUTO: 95.7 FL
MICROALBUMIN UR-MCNC: <0.5 MG/DL
MONOCYTES # BLD AUTO: 0.43 X10(3) UL (ref 0.1–1)
MONOCYTES NFR BLD AUTO: 9.9 %
NEUTROPHILS # BLD AUTO: 3.18 X10 (3) UL (ref 1.5–7.7)
NEUTROPHILS # BLD AUTO: 3.18 X10(3) UL (ref 1.5–7.7)
NEUTROPHILS NFR BLD AUTO: 73.2 %
OSMOLALITY SERPL CALC.SUM OF ELEC: 300 MOSM/KG (ref 275–295)
PLATELET # BLD AUTO: 77 10(3)UL (ref 150–450)
POTASSIUM SERPL-SCNC: 4.4 MMOL/L (ref 3.5–5.1)
PROT SERPL-MCNC: 7.5 G/DL (ref 6.4–8.2)
RBC # BLD AUTO: 4.45 X10(6)UL
SODIUM SERPL-SCNC: 143 MMOL/L (ref 136–145)
TIBC SERPL-MCNC: 297 UG/DL (ref 240–450)
TRANSFERRIN SERPL-MCNC: 199 MG/DL (ref 200–360)
WBC # BLD AUTO: 4.4 X10(3) UL (ref 4–11)

## 2022-07-30 PROCEDURE — 82570 ASSAY OF URINE CREATININE: CPT

## 2022-07-30 PROCEDURE — 80053 COMPREHEN METABOLIC PANEL: CPT

## 2022-07-30 PROCEDURE — 3051F HG A1C>EQUAL 7.0%<8.0%: CPT | Performed by: NURSE PRACTITIONER

## 2022-07-30 PROCEDURE — 84466 ASSAY OF TRANSFERRIN: CPT

## 2022-07-30 PROCEDURE — 82728 ASSAY OF FERRITIN: CPT

## 2022-07-30 PROCEDURE — 36415 COLL VENOUS BLD VENIPUNCTURE: CPT

## 2022-07-30 PROCEDURE — 83036 HEMOGLOBIN GLYCOSYLATED A1C: CPT

## 2022-07-30 PROCEDURE — 3061F NEG MICROALBUMINURIA REV: CPT | Performed by: NURSE PRACTITIONER

## 2022-07-30 PROCEDURE — 85025 COMPLETE CBC W/AUTO DIFF WBC: CPT

## 2022-07-30 PROCEDURE — 83540 ASSAY OF IRON: CPT

## 2022-07-30 PROCEDURE — 82043 UR ALBUMIN QUANTITATIVE: CPT

## 2022-08-23 ENCOUNTER — HOSPITAL ENCOUNTER (OUTPATIENT)
Dept: CT IMAGING | Age: 67
Discharge: HOME | End: 2022-08-23
Attending: NURSE PRACTITIONER
Payer: COMMERCIAL

## 2022-08-23 DIAGNOSIS — J84.10 LUNG GRANULOMA (HCC): ICD-10-CM

## 2022-08-23 PROCEDURE — 71250 CT THORAX DX C-: CPT | Performed by: NURSE PRACTITIONER

## 2022-08-25 ENCOUNTER — OFFICE VISIT (OUTPATIENT)
Dept: INTERNAL MEDICINE CLINIC | Facility: CLINIC | Age: 67
End: 2022-08-25
Payer: COMMERCIAL

## 2022-08-25 VITALS
RESPIRATION RATE: 17 BRPM | SYSTOLIC BLOOD PRESSURE: 107 MMHG | HEART RATE: 82 BPM | DIASTOLIC BLOOD PRESSURE: 64 MMHG | HEIGHT: 67 IN | BODY MASS INDEX: 41.91 KG/M2 | WEIGHT: 267 LBS

## 2022-08-25 DIAGNOSIS — R93.89 ABNORMAL CT OF THE CHEST: Primary | ICD-10-CM

## 2022-08-25 DIAGNOSIS — R73.03 PREDIABETES: ICD-10-CM

## 2022-08-25 PROCEDURE — 3078F DIAST BP <80 MM HG: CPT | Performed by: NURSE PRACTITIONER

## 2022-08-25 PROCEDURE — 3008F BODY MASS INDEX DOCD: CPT | Performed by: NURSE PRACTITIONER

## 2022-08-25 PROCEDURE — 3074F SYST BP LT 130 MM HG: CPT | Performed by: NURSE PRACTITIONER

## 2022-08-25 PROCEDURE — 99213 OFFICE O/P EST LOW 20 MIN: CPT | Performed by: NURSE PRACTITIONER

## 2022-09-03 ENCOUNTER — PATIENT MESSAGE (OUTPATIENT)
Dept: INTERNAL MEDICINE CLINIC | Facility: CLINIC | Age: 67
End: 2022-09-03

## 2022-09-07 RX ORDER — DEXTROAMPHETAMINE SACCHARATE, AMPHETAMINE ASPARTATE MONOHYDRATE, DEXTROAMPHETAMINE SULFATE AND AMPHETAMINE SULFATE 7.5; 7.5; 7.5; 7.5 MG/1; MG/1; MG/1; MG/1
30 CAPSULE, EXTENDED RELEASE ORAL DAILY
Qty: 30 CAPSULE | Refills: 0 | Status: SHIPPED | OUTPATIENT
Start: 2022-09-07

## 2022-10-10 RX ORDER — DEXTROAMPHETAMINE SACCHARATE, AMPHETAMINE ASPARTATE MONOHYDRATE, DEXTROAMPHETAMINE SULFATE AND AMPHETAMINE SULFATE 7.5; 7.5; 7.5; 7.5 MG/1; MG/1; MG/1; MG/1
30 CAPSULE, EXTENDED RELEASE ORAL DAILY
Qty: 30 CAPSULE | Refills: 0 | Status: SHIPPED | OUTPATIENT
Start: 2022-10-10

## 2022-10-10 NOTE — TELEPHONE ENCOUNTER
Please review. Protocol failed / No protocol. Requested Prescriptions   Pending Prescriptions Disp Refills    amphetamine-dextroamphetamine ER (ADDERALL XR) 30 MG Oral Capsule SR 24 Hr 30 capsule 0     Sig: Take 1 capsule (30 mg total) by mouth daily.         There is no refill protocol information for this order          Recent Outpatient Visits              1 month ago Abnormal CT of the chest    [address] [facility], [address], [place] Torsten Johnson., APRN    Office Visit    2 months ago Erectile dysfunction due to diseases classified elsewhere    [facility], [address], [place] Yonatan Iverson., APRN    Office Visit    4 months ago Erectile dysfunction, unspecified erectile dysfunction type    [facility] [place] [facility] Mike Marie MD    Office Visit    5 months ago Physical exam, annual    [facility], [address] [place], [place] [address]Alyssia [place], MD    Office Visit    5 months ago 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

## 2022-10-18 ENCOUNTER — IMMUNIZATION (OUTPATIENT)
Dept: LAB | Age: 67
End: 2022-10-18
Attending: EMERGENCY MEDICINE
Payer: COMMERCIAL

## 2022-10-18 DIAGNOSIS — Z23 NEED FOR VACCINATION: Primary | ICD-10-CM

## 2022-10-18 PROCEDURE — 0124A SARSCOV2 VAC BVL 30MCG/0.3ML: CPT

## 2022-10-24 ENCOUNTER — HOSPITAL ENCOUNTER (OUTPATIENT)
Dept: ULTRASOUND IMAGING | Facility: HOSPITAL | Age: 67
Discharge: HOME | End: 2022-10-24
Attending: INTERNAL MEDICINE
Payer: COMMERCIAL

## 2022-10-24 VITALS — WEIGHT: 267 LBS | HEIGHT: 65 IN | BODY MASS INDEX: 44.48 KG/M2

## 2022-10-24 DIAGNOSIS — K75.81 NASH (NONALCOHOLIC STEATOHEPATITIS): ICD-10-CM

## 2022-10-24 DIAGNOSIS — E11.9 TYPE 2 DIABETES MELLITUS WITHOUT COMPLICATION, WITHOUT LONG-TERM CURRENT USE OF INSULIN (HCC): ICD-10-CM

## 2022-10-24 DIAGNOSIS — K74.60 CIRRHOSIS (HCC): ICD-10-CM

## 2022-10-24 DIAGNOSIS — R18.8 OTHER ASCITES: ICD-10-CM

## 2022-10-24 LAB
ALBUMIN SERPL-MCNC: 2.9 G/DL (ref 3.4–5)
ALBUMIN/GLOB SERPL: 0.6 {RATIO} (ref 1–2)
ALP LIVER SERPL-CCNC: 113 U/L
ALT SERPL-CCNC: 44 U/L
ANION GAP SERPL CALC-SCNC: 4 MMOL/L (ref 0–18)
AST SERPL-CCNC: 41 U/L (ref 15–37)
BASOPHILS # BLD AUTO: 0.01 X10(3) UL (ref 0–0.2)
BASOPHILS NFR BLD AUTO: 0.2 %
BILIRUB SERPL-MCNC: 0.9 MG/DL (ref 0.1–2)
BUN BLD-MCNC: 15 MG/DL (ref 7–18)
BUN/CREAT SERPL: 11.5 (ref 10–20)
CALCIUM BLD-MCNC: 8.8 MG/DL (ref 8.5–10.1)
CHLORIDE SERPL-SCNC: 104 MMOL/L (ref 98–112)
CO2 SERPL-SCNC: 29 MMOL/L (ref 21–32)
CREAT BLD-MCNC: 1.3 MG/DL
DEPRECATED RDW RBC AUTO: 45.5 FL (ref 35.1–46.3)
EOSINOPHIL # BLD AUTO: 0.32 X10(3) UL (ref 0–0.7)
EOSINOPHIL NFR BLD AUTO: 7.6 %
ERYTHROCYTE [DISTWIDTH] IN BLOOD BY AUTOMATED COUNT: 13.5 % (ref 11–15)
EST. AVERAGE GLUCOSE BLD GHB EST-MCNC: 160 MG/DL (ref 68–126)
GFR SERPLBLD BASED ON 1.73 SQ M-ARVRAT: 60 ML/MIN/1.73M2 (ref 60–?)
GLOBULIN PLAS-MCNC: 4.7 G/DL (ref 2.8–4.4)
GLUCOSE BLD-MCNC: 152 MG/DL (ref 70–99)
HBA1C MFR BLD: 7.2 % (ref ?–5.7)
HCT VFR BLD AUTO: 39.2 %
HGB BLD-MCNC: 13.2 G/DL
IMM GRANULOCYTES # BLD AUTO: 0.01 X10(3) UL (ref 0–1)
IMM GRANULOCYTES NFR BLD: 0.2 %
INR BLD: 1.11 (ref 0.85–1.16)
LYMPHOCYTES # BLD AUTO: 0.47 X10(3) UL (ref 1–4)
LYMPHOCYTES NFR BLD AUTO: 11.1 %
MCH RBC QN AUTO: 31.5 PG (ref 26–34)
MCHC RBC AUTO-ENTMCNC: 33.7 G/DL (ref 31–37)
MCV RBC AUTO: 93.6 FL
MONOCYTES # BLD AUTO: 0.44 X10(3) UL (ref 0.1–1)
MONOCYTES NFR BLD AUTO: 10.4 %
NEUTROPHILS # BLD AUTO: 2.97 X10 (3) UL (ref 1.5–7.7)
NEUTROPHILS # BLD AUTO: 2.97 X10(3) UL (ref 1.5–7.7)
NEUTROPHILS NFR BLD AUTO: 70.5 %
OSMOLALITY SERPL CALC.SUM OF ELEC: 288 MOSM/KG (ref 275–295)
PLATELET # BLD AUTO: 80 10(3)UL (ref 150–450)
POTASSIUM SERPL-SCNC: 4.2 MMOL/L (ref 3.5–5.1)
PROT SERPL-MCNC: 7.6 G/DL (ref 6.4–8.2)
PROTHROMBIN TIME: 14.2 SECONDS (ref 11.6–14.8)
RBC # BLD AUTO: 4.19 X10(6)UL
SODIUM SERPL-SCNC: 137 MMOL/L (ref 136–145)
WBC # BLD AUTO: 4.2 X10(3) UL (ref 4–11)

## 2022-10-24 PROCEDURE — 85610 PROTHROMBIN TIME: CPT | Performed by: CLINICAL NURSE SPECIALIST

## 2022-10-24 PROCEDURE — 80053 COMPREHEN METABOLIC PANEL: CPT

## 2022-10-24 PROCEDURE — 83036 HEMOGLOBIN GLYCOSYLATED A1C: CPT

## 2022-10-24 PROCEDURE — 85027 COMPLETE CBC AUTOMATED: CPT | Performed by: CLINICAL NURSE SPECIALIST

## 2022-10-24 PROCEDURE — 49083 ABD PARACENTESIS W/IMAGING: CPT | Performed by: INTERNAL MEDICINE

## 2022-10-24 PROCEDURE — 36415 COLL VENOUS BLD VENIPUNCTURE: CPT | Performed by: CLINICAL NURSE SPECIALIST

## 2022-10-24 PROCEDURE — P9047 ALBUMIN (HUMAN), 25%, 50ML: HCPCS

## 2022-10-24 PROCEDURE — 3051F HG A1C>EQUAL 7.0%<8.0%: CPT | Performed by: NURSE PRACTITIONER

## 2022-10-24 PROCEDURE — 85025 COMPLETE CBC W/AUTO DIFF WBC: CPT

## 2022-10-24 RX ORDER — ALBUMIN (HUMAN) 12.5 G/50ML
SOLUTION INTRAVENOUS
Status: COMPLETED
Start: 2022-10-24 | End: 2022-10-24

## 2022-10-24 RX ORDER — ALBUMIN (HUMAN) 12.5 G/50ML
100 SOLUTION INTRAVENOUS AS NEEDED
Status: DISCONTINUED | OUTPATIENT
Start: 2022-10-24 | End: 2022-10-26

## 2022-10-24 RX ADMIN — ALBUMIN (HUMAN) 100 ML: 12.5 SOLUTION INTRAVENOUS at 14:50:00

## 2022-10-24 NOTE — IMAGING NOTE
1350 Pt to ultrasound room scouts taken by Aramis Luna RT    Hx taken procedure explained questions answered     [phone] Patient consented. Pt to get albumin 25% 25 grams yes    M SABINE APN/ [facility] APN  Here to access- scanning completed and reviewed. R PIV PLACED 20 G @ 1409    PLATELETS = 80   PT= 14.2    INR= 1.11    1432 Timeout taken    1432 Chloro prep  as skin prep sterile drape applied lidocaine 1% 10 milligrams per ml from kit was given for anesthetic affect 5 ml total given. Incision made with scalpel. [facility]  10 Kiswahili 10 cm [employer] catheter placed LEFT LOWER abdomen    Fluid aspirated for labs   NO     (IF CYTOLOGY FLUID NEEDED --COLLECT 1 LITER OF FLUID IN VACUUM BOTTLE) PER PATHOLOGY    1440 Catheter connected  to tubing then connected to [employer]. Draining begins continuously via  [place] [facility] COM HSPTL System. 1443 /69 HR 72 O2 SAT 94%    ALBUMIN #1 @ 1450    1500 Draining completed. Patient re scanned. total amount drained 3091 ml. Drain was dc'd. /65 HR 71 O2 SAT 94%      Pressure to site for 5 minutes. Area was cleaned steri strips to site. Post instructions was given verbally  provided to patient.     /79 HR 72 PO2     1518 Discharged    1510  [employer] system was cleaned after procedure by Clark Hunt RN

## 2022-11-10 ENCOUNTER — OFFICE VISIT (OUTPATIENT)
Dept: INTERNAL MEDICINE CLINIC | Facility: CLINIC | Age: 67
End: 2022-11-10
Payer: COMMERCIAL

## 2022-11-10 VITALS
HEART RATE: 80 BPM | HEIGHT: 65 IN | OXYGEN SATURATION: 95 % | DIASTOLIC BLOOD PRESSURE: 69 MMHG | SYSTOLIC BLOOD PRESSURE: 113 MMHG | BODY MASS INDEX: 46.98 KG/M2 | WEIGHT: 282 LBS

## 2022-11-10 DIAGNOSIS — R53.83 FATIGUE, UNSPECIFIED TYPE: Primary | ICD-10-CM

## 2022-11-10 DIAGNOSIS — K74.60 LIVER CIRRHOSIS SECONDARY TO NASH (HCC): ICD-10-CM

## 2022-11-10 DIAGNOSIS — K75.81 LIVER CIRRHOSIS SECONDARY TO NASH (HCC): ICD-10-CM

## 2022-11-10 PROCEDURE — 3074F SYST BP LT 130 MM HG: CPT | Performed by: NURSE PRACTITIONER

## 2022-11-10 PROCEDURE — 3078F DIAST BP <80 MM HG: CPT | Performed by: NURSE PRACTITIONER

## 2022-11-10 PROCEDURE — 3008F BODY MASS INDEX DOCD: CPT | Performed by: NURSE PRACTITIONER

## 2022-11-10 PROCEDURE — 99214 OFFICE O/P EST MOD 30 MIN: CPT | Performed by: NURSE PRACTITIONER

## 2022-11-10 RX ORDER — DEXTROAMPHETAMINE SACCHARATE, AMPHETAMINE ASPARTATE MONOHYDRATE, DEXTROAMPHETAMINE SULFATE AND AMPHETAMINE SULFATE 7.5; 7.5; 7.5; 7.5 MG/1; MG/1; MG/1; MG/1
30 CAPSULE, EXTENDED RELEASE ORAL DAILY
Qty: 30 CAPSULE | Refills: 0 | Status: SHIPPED | OUTPATIENT
Start: 2022-11-10

## 2022-11-10 NOTE — TELEPHONE ENCOUNTER
Patient called (identified name and ),   Patient requesting refill of Adderall. Last filled 10/10/2022. Routed to David Downing PA-C as Dr Thu Roche out of office this and next week.

## 2022-12-03 ENCOUNTER — APPOINTMENT (OUTPATIENT)
Dept: CT IMAGING | Facility: HOSPITAL | Age: 67
End: 2022-12-03
Attending: EMERGENCY MEDICINE
Payer: COMMERCIAL

## 2022-12-03 ENCOUNTER — HOSPITAL ENCOUNTER (EMERGENCY)
Facility: HOSPITAL | Age: 67
Discharge: HOME | End: 2022-12-04
Attending: EMERGENCY MEDICINE
Payer: COMMERCIAL

## 2022-12-03 VITALS
BODY MASS INDEX: 47 KG/M2 | DIASTOLIC BLOOD PRESSURE: 66 MMHG | WEIGHT: 281.94 LBS | RESPIRATION RATE: 16 BRPM | TEMPERATURE: 98 F | HEART RATE: 70 BPM | SYSTOLIC BLOOD PRESSURE: 123 MMHG | OXYGEN SATURATION: 94 %

## 2022-12-03 DIAGNOSIS — S02.2XXA CLOSED FRACTURE OF NASAL BONE, INITIAL ENCOUNTER: ICD-10-CM
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
## 2022-12-03 DIAGNOSIS — S00.81XA ABRASION OF FACE, INITIAL ENCOUNTER: Primary | ICD-10-CM

## 2022-12-03 PROCEDURE — 99284 EMERGENCY DEPT VISIT MOD MDM: CPT

## 2022-12-03 PROCEDURE — 70486 CT MAXILLOFACIAL W/O DYE: CPT | Performed by: EMERGENCY MEDICINE

## 2022-12-03 PROCEDURE — 70450 CT HEAD/BRAIN W/O DYE: CPT | Performed by: EMERGENCY MEDICINE

## 2022-12-03 PROCEDURE — 12011 RPR F/E/E/N/L/M 2.5 CM/<: CPT

## 2022-12-03 RX ORDER — ACETAMINOPHEN 500 MG
1000 TABLET ORAL ONCE
Status: DISCONTINUED | OUTPATIENT
Start: 2022-12-03 | End: 2022-12-04

## 2022-12-04 NOTE — ED INITIAL ASSESSMENT (HPI)
Patient tripped and fell while getting out of his friends car. Nose and forehead injury/lacerations. Denies LOC. AxOx4. Patient on Aspirin.

## 2022-12-04 NOTE — ED QUICK NOTES
Pt to and from ct in stable condition. Pt does not want tylenol at this time states will take it at home.

## 2022-12-04 NOTE — ED QUICK NOTES
Pt presents with laceration to forehead and nose s/p falling down about 2-3 feet while getting out of a car pt reports his knees gave out and he fell down onto knees and hit head and face pt denies loc denies head/neck pain, pt A&OX3 non labored breathing, speaks clear full sentences, pt denies cp/sob/n/v/dizziness/pt accompanied by wife, states no blood thinners besides aspirin, pt oriented to room, call light within reach no distress noted, pt moving all extremities well.

## 2022-12-06 ENCOUNTER — TELEPHONE (OUTPATIENT)
Dept: OTOLARYNGOLOGY | Facility: CLINIC | Age: 67
End: 2022-12-06

## 2022-12-07 ENCOUNTER — TELEPHONE (OUTPATIENT)
Dept: SURGERY | Facility: HOSPITAL | Age: 67
End: 2022-12-07

## 2022-12-08 ENCOUNTER — OFFICE VISIT (OUTPATIENT)
Dept: OTOLARYNGOLOGY | Facility: CLINIC | Age: 67
End: 2022-12-08
Payer: COMMERCIAL

## 2022-12-08 VITALS — WEIGHT: 282 LBS | HEIGHT: 65 IN | TEMPERATURE: 98 F | BODY MASS INDEX: 46.98 KG/M2

## 2022-12-08 DIAGNOSIS — J34.2 DEVIATED NASAL SEPTUM: Primary | ICD-10-CM

## 2022-12-08 DIAGNOSIS — S02.2XXA CLOSED FRACTURE OF NASAL BONE, INITIAL ENCOUNTER: ICD-10-CM

## 2022-12-08 PROCEDURE — 3008F BODY MASS INDEX DOCD: CPT | Performed by: SPECIALIST

## 2022-12-08 PROCEDURE — 99213 OFFICE O/P EST LOW 20 MIN: CPT | Performed by: SPECIALIST

## 2022-12-08 NOTE — PATIENT INSTRUCTIONS
You have a nasal fracture left greater than right. There is not an obvious deformity. As this is the case, I think we can hold off on any surgery. You do have a  left septal deviation. I am not certain if this is old or not. If you cannot breathe through your left side a septoplasty can be considered. If you change your mind and feel that the nose is not symmetric and off for your liking, it can be readjusted up to 2 weeks after the injury. Please let me know.

## 2022-12-17 ENCOUNTER — PATIENT MESSAGE (OUTPATIENT)
Dept: INTERNAL MEDICINE CLINIC | Facility: CLINIC | Age: 67
End: 2022-12-17

## 2022-12-19 ENCOUNTER — TELEPHONE (OUTPATIENT)
Facility: CLINIC | Age: 67
End: 2022-12-19

## 2022-12-19 RX ORDER — DEXTROAMPHETAMINE SACCHARATE, AMPHETAMINE ASPARTATE MONOHYDRATE, DEXTROAMPHETAMINE SULFATE AND AMPHETAMINE SULFATE 7.5; 7.5; 7.5; 7.5 MG/1; MG/1; MG/1; MG/1
30 CAPSULE, EXTENDED RELEASE ORAL DAILY
Qty: 30 CAPSULE | Refills: 0 | Status: SHIPPED | OUTPATIENT
Start: 2022-12-19

## 2023-01-23 ENCOUNTER — TELEPHONE (OUTPATIENT)
Dept: INTERNAL MEDICINE CLINIC | Facility: CLINIC | Age: 68
End: 2023-01-23

## 2023-01-23 RX ORDER — DEXTROAMPHETAMINE SACCHARATE, AMPHETAMINE ASPARTATE MONOHYDRATE, DEXTROAMPHETAMINE SULFATE AND AMPHETAMINE SULFATE 7.5; 7.5; 7.5; 7.5 MG/1; MG/1; MG/1; MG/1
30 CAPSULE, EXTENDED RELEASE ORAL DAILY
Qty: 30 CAPSULE | Refills: 0 | Status: SHIPPED | OUTPATIENT
Start: 2023-01-23 | End: 2023-02-22

## 2023-01-23 RX ORDER — DEXTROAMPHETAMINE SACCHARATE, AMPHETAMINE ASPARTATE MONOHYDRATE, DEXTROAMPHETAMINE SULFATE AND AMPHETAMINE SULFATE 7.5; 7.5; 7.5; 7.5 MG/1; MG/1; MG/1; MG/1
30 CAPSULE, EXTENDED RELEASE ORAL DAILY
Qty: 30 CAPSULE | Refills: 0 | Status: SHIPPED | OUTPATIENT
Start: 2023-03-26 | End: 2023-04-25

## 2023-01-23 RX ORDER — DEXTROAMPHETAMINE SACCHARATE, AMPHETAMINE ASPARTATE MONOHYDRATE, DEXTROAMPHETAMINE SULFATE AND AMPHETAMINE SULFATE 7.5; 7.5; 7.5; 7.5 MG/1; MG/1; MG/1; MG/1
30 CAPSULE, EXTENDED RELEASE ORAL DAILY
Qty: 30 CAPSULE | Refills: 0 | Status: SHIPPED | OUTPATIENT
Start: 2023-02-23 | End: 2023-03-25

## 2023-01-23 NOTE — TELEPHONE ENCOUNTER
Patient is requesting a refill of medication amphetamine-dextroamphetamine ER (ADDERALL XR) 30 MG Oral Capsule SR 24 Hr. Patient is out of medication.        [place], [place]

## 2023-01-24 ENCOUNTER — PATIENT MESSAGE (OUTPATIENT)
Dept: INTERNAL MEDICINE CLINIC | Facility: CLINIC | Age: 68
End: 2023-01-24

## 2023-01-24 ENCOUNTER — OFFICE VISIT (OUTPATIENT)
Dept: INTERNAL MEDICINE CLINIC | Facility: CLINIC | Age: 68
End: 2023-01-24

## 2023-01-24 VITALS
OXYGEN SATURATION: 95 % | HEART RATE: 69 BPM | BODY MASS INDEX: 44.65 KG/M2 | WEIGHT: 268 LBS | SYSTOLIC BLOOD PRESSURE: 112 MMHG | HEIGHT: 65 IN | DIASTOLIC BLOOD PRESSURE: 71 MMHG

## 2023-01-24 DIAGNOSIS — K74.60 LIVER CIRRHOSIS SECONDARY TO NASH (HCC): ICD-10-CM

## 2023-01-24 DIAGNOSIS — K75.81 LIVER CIRRHOSIS SECONDARY TO NASH (HCC): ICD-10-CM

## 2023-01-24 DIAGNOSIS — E11.9 TYPE 2 DIABETES MELLITUS WITHOUT COMPLICATION, WITHOUT LONG-TERM CURRENT USE OF INSULIN (HCC): Primary | ICD-10-CM

## 2023-01-24 DIAGNOSIS — F90.0 ATTENTION DEFICIT HYPERACTIVITY DISORDER (ADHD), PREDOMINANTLY INATTENTIVE TYPE: ICD-10-CM

## 2023-01-24 DIAGNOSIS — R41.3 MEMORY CHANGES: ICD-10-CM

## 2023-01-24 LAB
CARTRIDGE LOT#: 5035 NUMERIC
HEMOGLOBIN A1C: 9.3 % (ref 4.3–5.6)

## 2023-01-24 PROCEDURE — 3046F HEMOGLOBIN A1C LEVEL >9.0%: CPT | Performed by: INTERNAL MEDICINE

## 2023-01-24 PROCEDURE — 99214 OFFICE O/P EST MOD 30 MIN: CPT | Performed by: INTERNAL MEDICINE

## 2023-01-24 PROCEDURE — 83036 HEMOGLOBIN GLYCOSYLATED A1C: CPT | Performed by: INTERNAL MEDICINE

## 2023-01-24 PROCEDURE — 3008F BODY MASS INDEX DOCD: CPT | Performed by: INTERNAL MEDICINE

## 2023-01-24 PROCEDURE — 3078F DIAST BP <80 MM HG: CPT | Performed by: INTERNAL MEDICINE

## 2023-01-24 PROCEDURE — 3074F SYST BP LT 130 MM HG: CPT | Performed by: INTERNAL MEDICINE

## 2023-01-24 RX ORDER — DEXTROAMPHETAMINE SACCHARATE, AMPHETAMINE ASPARTATE MONOHYDRATE, DEXTROAMPHETAMINE SULFATE AND AMPHETAMINE SULFATE 6.25; 6.25; 6.25; 6.25 MG/1; MG/1; MG/1; MG/1
25 CAPSULE, EXTENDED RELEASE ORAL DAILY
Qty: 30 CAPSULE | Refills: 0 | Status: SHIPPED | OUTPATIENT
Start: 2023-01-24 | End: 2023-02-23

## 2023-01-24 RX ORDER — REPAGLINIDE 1 MG/1
1 TABLET ORAL
Qty: 90 TABLET | Refills: 0 | Status: SHIPPED | OUTPATIENT
Start: 2023-01-24 | End: 2023-01-25

## 2023-01-24 RX ORDER — DEXTROAMPHETAMINE SACCHARATE, AMPHETAMINE ASPARTATE MONOHYDRATE, DEXTROAMPHETAMINE SULFATE AND AMPHETAMINE SULFATE 6.25; 6.25; 6.25; 6.25 MG/1; MG/1; MG/1; MG/1
25 CAPSULE, EXTENDED RELEASE ORAL DAILY
Qty: 30 CAPSULE | Refills: 0 | Status: SHIPPED | OUTPATIENT
Start: 2023-03-27 | End: 2023-04-26

## 2023-01-24 RX ORDER — DEXTROAMPHETAMINE SACCHARATE, AMPHETAMINE ASPARTATE MONOHYDRATE, DEXTROAMPHETAMINE SULFATE AND AMPHETAMINE SULFATE 6.25; 6.25; 6.25; 6.25 MG/1; MG/1; MG/1; MG/1
25 CAPSULE, EXTENDED RELEASE ORAL DAILY
Qty: 30 CAPSULE | Refills: 0 | Status: SHIPPED | OUTPATIENT
Start: 2023-02-24 | End: 2023-03-27

## 2023-01-24 NOTE — TELEPHONE ENCOUNTER
Advised spouse Minnie(on hipaa) if she can find the adderall patient takes at a different pharmacy or lower dose to equal his dose to let us know which pharmacy has it and we will send it to Dr Sophie Smith to sign off on. Spouse agreed. Follow up appointment made for today at 2:40pm with Dr Sophie Smith in [place].

## 2023-01-24 NOTE — TELEPHONE ENCOUNTER
Dr Lelo Iraheta below and advice,thanks. No future appointments. From: Isabela Shepherd  Sent: 1/24/2023  2:24 AM CST  To: Em Rn Triage  Subject: refill    This message is being sent by Irasema Kuhn on behalf of Isabela Shepherd. Adderall XR is backordered with no availability date. It is out of stock all over the country. Is there a different medication Markosyton Callander  can be put on temporarily?

## 2023-01-25 RX ORDER — REPAGLINIDE 1 MG/1
TABLET ORAL
Qty: 270 TABLET | Refills: 0 | Status: SHIPPED | OUTPATIENT
Start: 2023-01-25

## 2023-01-27 ENCOUNTER — TELEPHONE (OUTPATIENT)
Dept: INTERNAL MEDICINE CLINIC | Facility: CLINIC | Age: 68
End: 2023-01-27

## 2023-01-27 NOTE — TELEPHONE ENCOUNTER
Order placed for patient to do hemoglobin A1c at the lab. No fasting required.   Please let him know

## 2023-01-27 NOTE — TELEPHONE ENCOUNTER
Per patient, he was seen by Dr Berna Disla on 1/24/23 and did the rapid hemoglobin AIC at the clinic and it was surprisingly high  9.3,  States that his previous 2 tests back in October and July 2022 both numbers are  7.2. Requesting to get an order for repeat  [facility] and he will  re do it in the \"regular blood test \" just for confirmation.

## 2023-03-04 ENCOUNTER — LAB ENCOUNTER (OUTPATIENT)
Dept: LAB | Age: 68
End: 2023-03-04
Attending: INTERNAL MEDICINE
Payer: COMMERCIAL

## 2023-03-04 DIAGNOSIS — E11.9 TYPE 2 DIABETES MELLITUS WITHOUT COMPLICATION, WITHOUT LONG-TERM CURRENT USE OF INSULIN (HCC): ICD-10-CM

## 2023-03-04 LAB
EST. AVERAGE GLUCOSE BLD GHB EST-MCNC: 169 MG/DL (ref 68–126)
HBA1C MFR BLD: 7.5 % (ref ?–5.7)

## 2023-03-04 PROCEDURE — 83036 HEMOGLOBIN GLYCOSYLATED A1C: CPT

## 2023-03-04 PROCEDURE — 36415 COLL VENOUS BLD VENIPUNCTURE: CPT

## 2023-03-21 NOTE — TELEPHONE ENCOUNTER
Medication pended for your review and approval. [] : no trapezial tenderness [de-identified] : extension 0 degrees [FreeTextEntry1] : T8 wedge deformity hx of compression fracture

## 2023-04-07 ENCOUNTER — OFFICE VISIT (OUTPATIENT)
Dept: INTERNAL MEDICINE CLINIC | Facility: CLINIC | Age: 68
End: 2023-04-07

## 2023-04-07 VITALS
DIASTOLIC BLOOD PRESSURE: 60 MMHG | HEIGHT: 65 IN | WEIGHT: 276.63 LBS | SYSTOLIC BLOOD PRESSURE: 109 MMHG | OXYGEN SATURATION: 97 % | HEART RATE: 77 BPM | RESPIRATION RATE: 18 BRPM | BODY MASS INDEX: 46.09 KG/M2

## 2023-04-07 DIAGNOSIS — E11.9 TYPE 2 DIABETES MELLITUS WITHOUT COMPLICATION, WITHOUT LONG-TERM CURRENT USE OF INSULIN (HCC): ICD-10-CM

## 2023-04-07 DIAGNOSIS — K74.4 SECONDARY BILIARY CIRRHOSIS (HCC): Primary | ICD-10-CM

## 2023-04-07 PROCEDURE — 1126F AMNT PAIN NOTED NONE PRSNT: CPT | Performed by: INTERNAL MEDICINE

## 2023-04-07 PROCEDURE — 99214 OFFICE O/P EST MOD 30 MIN: CPT | Performed by: INTERNAL MEDICINE

## 2023-04-07 RX ORDER — DEXTROAMPHETAMINE SACCHARATE, AMPHETAMINE ASPARTATE MONOHYDRATE, DEXTROAMPHETAMINE SULFATE AND AMPHETAMINE SULFATE 6.25; 6.25; 6.25; 6.25 MG/1; MG/1; MG/1; MG/1
25 CAPSULE, EXTENDED RELEASE ORAL DAILY
Qty: 30 CAPSULE | Refills: 0 | Status: SHIPPED | OUTPATIENT
Start: 2023-06-26

## 2023-04-07 RX ORDER — DEXTROAMPHETAMINE SACCHARATE, AMPHETAMINE ASPARTATE MONOHYDRATE, DEXTROAMPHETAMINE SULFATE AND AMPHETAMINE SULFATE 6.25; 6.25; 6.25; 6.25 MG/1; MG/1; MG/1; MG/1
25 CAPSULE, EXTENDED RELEASE ORAL DAILY
Qty: 30 CAPSULE | Refills: 0 | Status: SHIPPED | OUTPATIENT
Start: 2023-04-28

## 2023-04-07 RX ORDER — DEXTROAMPHETAMINE SACCHARATE, AMPHETAMINE ASPARTATE MONOHYDRATE, DEXTROAMPHETAMINE SULFATE AND AMPHETAMINE SULFATE 6.25; 6.25; 6.25; 6.25 MG/1; MG/1; MG/1; MG/1
25 CAPSULE, EXTENDED RELEASE ORAL DAILY
Qty: 30 CAPSULE | Refills: 0 | Status: SHIPPED | OUTPATIENT
Start: 2023-05-27

## 2023-04-08 ENCOUNTER — LAB ENCOUNTER (OUTPATIENT)
Dept: LAB | Age: 68
End: 2023-04-08
Attending: INTERNAL MEDICINE
Payer: COMMERCIAL

## 2023-04-08 DIAGNOSIS — K74.4 SECONDARY BILIARY CIRRHOSIS (HCC): ICD-10-CM

## 2023-04-08 DIAGNOSIS — E11.9 TYPE 2 DIABETES MELLITUS WITHOUT COMPLICATION, WITHOUT LONG-TERM CURRENT USE OF INSULIN (HCC): ICD-10-CM

## 2023-04-08 LAB
ALBUMIN SERPL-MCNC: 2.7 G/DL (ref 3.4–5)
ALBUMIN/GLOB SERPL: 0.6 {RATIO} (ref 1–2)
ALP LIVER SERPL-CCNC: 87 U/L
ALT SERPL-CCNC: 37 U/L
AMMONIA PLAS-MCNC: 46 UMOL/L (ref 11–32)
ANION GAP SERPL CALC-SCNC: 4 MMOL/L (ref 0–18)
AST SERPL-CCNC: 42 U/L (ref 15–37)
BASOPHILS # BLD AUTO: 0.02 X10(3) UL (ref 0–0.2)
BASOPHILS NFR BLD AUTO: 0.6 %
BILIRUB SERPL-MCNC: 0.7 MG/DL (ref 0.1–2)
BUN BLD-MCNC: 13 MG/DL (ref 7–18)
BUN/CREAT SERPL: 11.6 (ref 10–20)
CALCIUM BLD-MCNC: 8.8 MG/DL (ref 8.5–10.1)
CHLORIDE SERPL-SCNC: 108 MMOL/L (ref 98–112)
CO2 SERPL-SCNC: 30 MMOL/L (ref 21–32)
CREAT BLD-MCNC: 1.12 MG/DL
DEPRECATED RDW RBC AUTO: 49.1 FL (ref 35.1–46.3)
EOSINOPHIL # BLD AUTO: 0.2 X10(3) UL (ref 0–0.7)
EOSINOPHIL NFR BLD AUTO: 6.3 %
ERYTHROCYTE [DISTWIDTH] IN BLOOD BY AUTOMATED COUNT: 14.6 % (ref 11–15)
EST. AVERAGE GLUCOSE BLD GHB EST-MCNC: 151 MG/DL (ref 68–126)
FASTING STATUS PATIENT QL REPORTED: YES
GFR SERPLBLD BASED ON 1.73 SQ M-ARVRAT: 72 ML/MIN/1.73M2 (ref 60–?)
GLOBULIN PLAS-MCNC: 4.3 G/DL (ref 2.8–4.4)
GLUCOSE BLD-MCNC: 134 MG/DL (ref 70–99)
HBA1C MFR BLD: 6.9 % (ref ?–5.7)
HCT VFR BLD AUTO: 35.5 %
HGB BLD-MCNC: 11.5 G/DL
IMM GRANULOCYTES # BLD AUTO: 0.01 X10(3) UL (ref 0–1)
IMM GRANULOCYTES NFR BLD: 0.3 %
LYMPHOCYTES # BLD AUTO: 0.3 X10(3) UL (ref 1–4)
LYMPHOCYTES NFR BLD AUTO: 9.4 %
MCH RBC QN AUTO: 30.1 PG (ref 26–34)
MCHC RBC AUTO-ENTMCNC: 32.4 G/DL (ref 31–37)
MCV RBC AUTO: 92.9 FL
MONOCYTES # BLD AUTO: 0.37 X10(3) UL (ref 0.1–1)
MONOCYTES NFR BLD AUTO: 11.6 %
NEUTROPHILS # BLD AUTO: 2.29 X10 (3) UL (ref 1.5–7.7)
NEUTROPHILS # BLD AUTO: 2.29 X10(3) UL (ref 1.5–7.7)
NEUTROPHILS NFR BLD AUTO: 71.8 %
OSMOLALITY SERPL CALC.SUM OF ELEC: 296 MOSM/KG (ref 275–295)
PLATELET # BLD AUTO: 68 10(3)UL (ref 150–450)
POTASSIUM SERPL-SCNC: 4 MMOL/L (ref 3.5–5.1)
PROT SERPL-MCNC: 7 G/DL (ref 6.4–8.2)
RBC # BLD AUTO: 3.82 X10(6)UL
SODIUM SERPL-SCNC: 142 MMOL/L (ref 136–145)
WBC # BLD AUTO: 3.2 X10(3) UL (ref 4–11)

## 2023-04-08 PROCEDURE — 82140 ASSAY OF AMMONIA: CPT

## 2023-04-08 PROCEDURE — 85025 COMPLETE CBC W/AUTO DIFF WBC: CPT

## 2023-04-08 PROCEDURE — 83036 HEMOGLOBIN GLYCOSYLATED A1C: CPT

## 2023-04-08 PROCEDURE — 80053 COMPREHEN METABOLIC PANEL: CPT

## 2023-04-08 PROCEDURE — 36415 COLL VENOUS BLD VENIPUNCTURE: CPT

## 2023-04-12 RX ORDER — OMEPRAZOLE 20 MG/1
20 CAPSULE, DELAYED RELEASE ORAL EVERY MORNING
Qty: 90 CAPSULE | Refills: 3 | Status: SHIPPED | OUTPATIENT
Start: 2023-04-12

## 2023-04-12 NOTE — TELEPHONE ENCOUNTER
Refill passed per [place] [employer], [facility] protocol.      Requested Prescriptions   Pending Prescriptions Disp Refills    OMEPRAZOLE 20 MG Oral Capsule Delayed Release [Pharmacy Med Name: OMEPRAZOLE 20MG CAPSULES] 90 capsule 1     Sig: TAKE 1 CAPSULE(20 MG) BY MOUTH EVERY MORNING       Gastrointestional Medication Protocol Passed - 4/11/2023  7:48 PM        Passed - In person appointment or virtual visit in the past 12 mos or appointment in next 3 mos     Recent Outpatient Visits              5 days ago 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([address]    [facility], [address] [place], [place] [address]Arthur [place], MD    Office Visit    2 months ago Type 2 diabetes mellitus without complication, without long-term current use of insulin ([address]    [facility] Arthur Green [place], MD    Office Visit    4 months ago Deviated nasal septum    [address] [place], [place] [address]Brody MD    Office Visit    5 months ago Fatigue, unspecified type    [facility], [address], [place] Paris Johnson., APRN    Office Visit    7 months ago Abnormal CT of the chest    [facility], [address] [place], [place] [address]Brittny., APRN    Office Visit          Future Appointments         Provider Department Appt Notes    In 2 months MD Tobi Mer[address], [place] 3 Month F/u                     Recent Outpatient Visits              5 days ago 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[facility])    Saw Swartz Acquanetta Blowers, MD    Office Visit    2 months ago Type 2 diabetes mellitus without complication, without long-term current use of insulin ([facility] [address]    [facility] Ziyad Green MD    Office Visit    4 months ago Deviated nasal septum    [address] [place], [place] [address]Brody MD    Office Visit    5 months ago Fatigue, unspecified type [facility], [address], [place] Elizabeth, Reji Lion., APRN    Office Visit    7 months ago Abnormal CT of the chest    [facility], [address] Jen Jama., APRN    Office Visit             Future Appointments         Provider Department Appt Notes    In 2 months Niki De Leon MD [address], [address], [place] 3 Month F/u

## 2023-04-20 PROBLEM — E11.9 TYPE 2 DIABETES MELLITUS WITHOUT COMPLICATION, WITHOUT LONG-TERM CURRENT USE OF INSULIN (HCC): Status: ACTIVE | Noted: 2023-04-20

## 2023-05-01 ENCOUNTER — TELEPHONE (OUTPATIENT)
Dept: INTERNAL MEDICINE CLINIC | Facility: CLINIC | Age: 68
End: 2023-05-01

## 2023-05-01 NOTE — TELEPHONE ENCOUNTER
On Call: Wife Virginia Thompson called, states Joel Napoles has been asleep 24 hours, awake for maybe 1 hour. Has not been checking BP or sugar at home. She is concerned. Advised to check BP and BG. Reviewed to go to ER for evaluation, unclear reason for hypersomnolence, possible accidental drug overdose as he is on multiple Rx, hyper or hypoglycemia, low Bp vs more serious etiology. Wife agreeable to go. Tried to call patient himself but went to  and  box full.

## 2023-05-03 RX ORDER — DEXTROAMPHETAMINE SACCHARATE, AMPHETAMINE ASPARTATE MONOHYDRATE, DEXTROAMPHETAMINE SULFATE AND AMPHETAMINE SULFATE 6.25; 6.25; 6.25; 6.25 MG/1; MG/1; MG/1; MG/1
25 CAPSULE, EXTENDED RELEASE ORAL DAILY
Qty: 30 CAPSULE | Refills: 0 | Status: SHIPPED | OUTPATIENT
Start: 2023-07-04 | End: 2023-08-03

## 2023-05-03 RX ORDER — DEXTROAMPHETAMINE SACCHARATE, AMPHETAMINE ASPARTATE MONOHYDRATE, DEXTROAMPHETAMINE SULFATE AND AMPHETAMINE SULFATE 6.25; 6.25; 6.25; 6.25 MG/1; MG/1; MG/1; MG/1
25 CAPSULE, EXTENDED RELEASE ORAL DAILY
Qty: 30 CAPSULE | Refills: 0 | Status: SHIPPED | OUTPATIENT
Start: 2023-06-03 | End: 2023-07-04

## 2023-05-03 RX ORDER — DEXTROAMPHETAMINE SACCHARATE, AMPHETAMINE ASPARTATE MONOHYDRATE, DEXTROAMPHETAMINE SULFATE AND AMPHETAMINE SULFATE 6.25; 6.25; 6.25; 6.25 MG/1; MG/1; MG/1; MG/1
25 CAPSULE, EXTENDED RELEASE ORAL DAILY
Qty: 30 CAPSULE | Refills: 0 | Status: SHIPPED | OUTPATIENT
Start: 2023-05-03 | End: 2023-06-02

## 2023-05-03 NOTE — TELEPHONE ENCOUNTER
Patient's wife, Wil Miranda states that patient lost his paper prescription for Adderall. He never refilled his prescription for this month. He has been out of Adderall since Saturday. Wife is requesting panel be sent to [facility] in [place].      Medication pended for your review and approval.

## 2023-05-24 ENCOUNTER — TELEPHONE (OUTPATIENT)
Dept: INTERNAL MEDICINE CLINIC | Facility: CLINIC | Age: 68
End: 2023-05-24

## 2023-05-24 NOTE — TELEPHONE ENCOUNTER
Refill passed per [employer], [facility] protocol.     Requested Prescriptions   Pending Prescriptions Disp Refills    FREESTYLE GOLD 2 SENSOR Does not apply Misc [Pharmacy Med Name: Eliot Muñoz 2 SENSOR] 1 each 0     Sig: USE AS DIRECTED       Diabetic Supplies Protocol Passed - 5/23/2023  7:17 PM        Passed - In person appointment or virtual visit in the past 12 mos or appointment in next 3 mos     Recent Outpatient Visits              1 month ago 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([facility] [address]    [address], [facility]    Office Visit    4 months ago Type 2 diabetes mellitus without complication, without long-term current use of insulin ([facility] [address] [facility]    [address]    Office Visit    5 months ago Deviated nasal septum    [address], [address],[address], Eliazar Avelar MD    Office Visit    6 months ago Fatigue, unspecified type    [facility]., APRN    Office Visit    9 months ago Abnormal CT of the chest    [address] APRN    Office Visit          Future Appointments         Provider Department Appt Notes    In 1 month Heather Salazar MD [address], [place] 3 Month F/u                    Recent Outpatient Visits              1 month ago 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[facility])    [address]    Office Visit    4 months ago Type 2 diabetes mellitus without complication, without long-term current use of insulin ([facility] [address] [facility]    [address] [facility]    Office Visit    5 months ago Deviated nasal septum    Eliazar Espana MD    Office Visit    6 months ago Fatigue, unspecified type    [address], [facility], Janna Cisneros., APRN    Office Visit    9 months ago Abnormal CT of the chest    [facility], [address], [address], Carol Forte., APRN    Office Visit            Future Appointments         Provider Department Appt Notes    In 1 month Eugenio Kaiser MD [address], [address], [place] 3 Month F/u

## 2023-05-25 NOTE — TELEPHONE ENCOUNTER
Spoke with Jenny Garay, TORY verified, she stated pt already p/u freestyle vaishnavi and paid $37.99 for a censor for 14 days.       SARA

## 2023-05-25 NOTE — TELEPHONE ENCOUNTER
Please figure out call pharmacy or speak to endocrinology department how we order freestyle vaishnavi 3 and create prescription I will sign off

## 2023-06-03 ENCOUNTER — LAB ENCOUNTER (OUTPATIENT)
Dept: LAB | Age: 68
End: 2023-06-03
Attending: INTERNAL MEDICINE
Payer: COMMERCIAL

## 2023-06-03 DIAGNOSIS — E61.1 IRON DEFICIENCY: ICD-10-CM

## 2023-06-03 DIAGNOSIS — D61.818 PANCYTOPENIA (HCC): ICD-10-CM

## 2023-06-03 DIAGNOSIS — K74.4 SECONDARY BILIARY CIRRHOSIS (HCC): ICD-10-CM

## 2023-06-03 LAB
BASOPHILS # BLD AUTO: 0.02 X10(3) UL (ref 0–0.2)
BASOPHILS NFR BLD AUTO: 0.6 %
DEPRECATED HBV CORE AB SER IA-ACNC: 21.8 NG/ML
DEPRECATED RDW RBC AUTO: 47.5 FL (ref 35.1–46.3)
EOSINOPHIL # BLD AUTO: 0.16 X10(3) UL (ref 0–0.7)
EOSINOPHIL NFR BLD AUTO: 4.9 %
ERYTHROCYTE [DISTWIDTH] IN BLOOD BY AUTOMATED COUNT: 14.1 % (ref 11–15)
FOLATE SERPL-MCNC: >20 NG/ML (ref 8.7–?)
HCT VFR BLD AUTO: 36 %
HGB BLD-MCNC: 11.7 G/DL
IMM GRANULOCYTES # BLD AUTO: 0.01 X10(3) UL (ref 0–1)
IMM GRANULOCYTES NFR BLD: 0.3 %
INR BLD: 1.14 (ref 0.85–1.16)
IRON SATN MFR SERPL: 21 %
IRON SERPL-MCNC: 81 UG/DL
LYMPHOCYTES # BLD AUTO: 0.37 X10(3) UL (ref 1–4)
LYMPHOCYTES NFR BLD AUTO: 11.2 %
MCH RBC QN AUTO: 29.7 PG (ref 26–34)
MCHC RBC AUTO-ENTMCNC: 32.5 G/DL (ref 31–37)
MCV RBC AUTO: 91.4 FL
MONOCYTES # BLD AUTO: 0.42 X10(3) UL (ref 0.1–1)
MONOCYTES NFR BLD AUTO: 12.8 %
NEUTROPHILS # BLD AUTO: 2.31 X10 (3) UL (ref 1.5–7.7)
NEUTROPHILS # BLD AUTO: 2.31 X10(3) UL (ref 1.5–7.7)
NEUTROPHILS NFR BLD AUTO: 70.2 %
PLATELET # BLD AUTO: 78 10(3)UL (ref 150–450)
PROTHROMBIN TIME: 14.5 SECONDS (ref 11.6–14.8)
RBC # BLD AUTO: 3.94 X10(6)UL
TIBC SERPL-MCNC: 389 UG/DL (ref 240–450)
TRANSFERRIN SERPL-MCNC: 261 MG/DL (ref 200–360)
VIT B12 SERPL-MCNC: 1636 PG/ML (ref 193–986)
WBC # BLD AUTO: 3.3 X10(3) UL (ref 4–11)

## 2023-06-03 PROCEDURE — 82607 VITAMIN B-12: CPT

## 2023-06-03 PROCEDURE — 36415 COLL VENOUS BLD VENIPUNCTURE: CPT

## 2023-06-03 PROCEDURE — 85025 COMPLETE CBC W/AUTO DIFF WBC: CPT

## 2023-06-03 PROCEDURE — 83540 ASSAY OF IRON: CPT

## 2023-06-03 PROCEDURE — 82746 ASSAY OF FOLIC ACID SERUM: CPT

## 2023-06-03 PROCEDURE — 84466 ASSAY OF TRANSFERRIN: CPT

## 2023-06-03 PROCEDURE — 85610 PROTHROMBIN TIME: CPT

## 2023-06-03 PROCEDURE — 82728 ASSAY OF FERRITIN: CPT

## 2023-06-05 ENCOUNTER — HOSPITAL ENCOUNTER (OUTPATIENT)
Dept: ULTRASOUND IMAGING | Facility: HOSPITAL | Age: 68
Discharge: HOME | End: 2023-06-05
Attending: INTERNAL MEDICINE
Payer: COMMERCIAL

## 2023-06-05 DIAGNOSIS — K74.60 LIVER CIRRHOSIS SECONDARY TO NASH (HCC): ICD-10-CM

## 2023-06-05 DIAGNOSIS — K75.81 LIVER CIRRHOSIS SECONDARY TO NASH (HCC): ICD-10-CM

## 2023-06-05 PROCEDURE — 49083 ABD PARACENTESIS W/IMAGING: CPT | Performed by: INTERNAL MEDICINE

## 2023-06-05 RX ORDER — DEXTROAMPHETAMINE SACCHARATE, AMPHETAMINE ASPARTATE MONOHYDRATE, DEXTROAMPHETAMINE SULFATE AND AMPHETAMINE SULFATE 6.25; 6.25; 6.25; 6.25 MG/1; MG/1; MG/1; MG/1
25 CAPSULE, EXTENDED RELEASE ORAL DAILY
Qty: 30 CAPSULE | Refills: 0 | Status: SHIPPED | OUTPATIENT
Start: 2023-07-04 | End: 2023-08-03

## 2023-06-05 NOTE — IMAGING NOTE
1245: Pt to ultrasound room     Hx taken procedure explained questions answered    1252: scouts taken by CLINTON, ultrasound TECH.    1301: Patient consented. Pt to get albumin 25% 25 grams: IF NEEDED PER PROTOCOL     1301: Nadeem Pump APN  Here to access- scanning completed and reviewed. PLATELETS = 68.7     PT= 14.5    INR= 1.14    1302: Timeout taken    1305: Chloro prep  as skin prep sterile drape applied lidocaine 1% 10 milligrams per ml from kit was given for anesthetic affect 4 ml total given. Incision made with scalpel. 1307: 5 Gambian  10 cm yueh catheter placed LUQ abdomen    Fluid aspirated for labs: NO     1308: Catheter connected  to tubing then connected to [employer] to [employer]. Draining begins continuously via  [place] [facility] HSPTL System. CLEAR YELLOW FLUID DRAINING. 1312: ~ 1022 ML DRAINED HR 69 /66    1320: ~ 2224 ML DRAINED HR 67 /62    1341:  Draining completed. HR 72 /70    Patient re scanned. total amount drained 3610 ML. Drain was dc'd. Pressure to site for 5 minutes. Area was cleaned steri strips to site. 1350: Post instructions was given verbally, declines need for written instructions. Pt discharged.       1352:  Streamway system was cleaned after procedure by Fabricio King., RN

## 2023-06-06 ENCOUNTER — TELEPHONE (OUTPATIENT)
Dept: INTERNAL MEDICINE CLINIC | Facility: CLINIC | Age: 68
End: 2023-06-06

## 2023-06-06 NOTE — TELEPHONE ENCOUNTER
Wife Vero Navarrete states patient is in bed and lethargic due to not having taken Adderall in 4 days. States pharmacy is out of medication and cannot keep calling other pharmacies to have this filled. Asked further questions regarding patient's lethargy and states he does get this way from time to time due to health condition and that Dr. Doroteo Palmer has seen him this way once in past.  Loco Vivar not to hesitate in calling 911 if needed. States she will do so if needed. Vero Navarrete wanting to just inform Dr. Doroteo Palmer of this. I called [facility] in [place] for medication. [facility] states they have 30 pills of Adderall ER 25 mg and will fill today. [facility] also states the reason for the hold up on medication is that when he spoke with Vero Navarrete earlier today, she had thought the patient was on a different dosage. Stated it should be 25 mg. Filling now. Left message on Minnie's cell.

## 2023-06-06 NOTE — TELEPHONE ENCOUNTER
Minnie calling back to make sure medication sent to correct pharmacy. Informed that it was sent to requested pharmacy - [facility] in [facility]. States understanding.

## 2023-06-07 NOTE — TELEPHONE ENCOUNTER
Spoke to patient's wife, ok per COREY (name and  of patient verified). She reports patient went to  Adderall prescription yesterday and pharmacy stated they did not have medication in stock. She is requesting prescription to [facility] on [address] in [address] [address] at [facility] (name and  of patient verified) he explained prescription was postponed to fill for next month, but he was able to adjust and medication will be filled today and ready in 1 hour. Contacted patient's wife (name and  of patient verified). Notified her of above, verbalizes understanding.

## 2023-06-28 ENCOUNTER — TELEPHONE (OUTPATIENT)
Dept: INTERNAL MEDICINE CLINIC | Facility: CLINIC | Age: 68
End: 2023-06-28

## 2023-07-25 ENCOUNTER — HOSPITAL ENCOUNTER (OUTPATIENT)
Dept: ULTRASOUND IMAGING | Age: 68
Discharge: HOME | End: 2023-07-25
Attending: INTERNAL MEDICINE
Payer: COMMERCIAL

## 2023-07-25 ENCOUNTER — TELEPHONE (OUTPATIENT)
Dept: INTERNAL MEDICINE CLINIC | Facility: CLINIC | Age: 68
End: 2023-07-25

## 2023-07-25 ENCOUNTER — TELEPHONE (OUTPATIENT)
Facility: CLINIC | Age: 68
End: 2023-07-25

## 2023-07-25 DIAGNOSIS — K75.81 LIVER CIRRHOSIS SECONDARY TO NASH (HCC): ICD-10-CM

## 2023-07-25 DIAGNOSIS — K74.60 LIVER CIRRHOSIS SECONDARY TO NASH (HCC): ICD-10-CM

## 2023-07-25 PROCEDURE — 76705 ECHO EXAM OF ABDOMEN: CPT | Performed by: INTERNAL MEDICINE

## 2023-07-25 NOTE — TELEPHONE ENCOUNTER
Spoke with pt and his wife,  verified, he stated he was vomiting bld in [place]. Pt was seen in ER [facility] and was transferred and admitted to [facility].   They were worried about his hgb level, BP, he had endo done and was told he had esophageal varices tied up on 6 veins, he also rec'd 2 units of bld. Pt was discharged yesterday and today still feels lethargic. Pt also had liver ultrasound done ordered by GI. Pt is looking for hosp f/u   Can we add pt sometime this week or next week? pls advise, thanks in advance.            Future Appointments   Date Time Provider Tico Enciso   2023 11:20 AM Niki De Leon MD [address]

## 2023-07-25 NOTE — TELEPHONE ENCOUNTER
Pt received a call from Dr. Selam Taylor and is calling back. Tried to call back, but no answer and mailbox full. Also called wife Minnie's number and left a message on her phone.   Tried home phone and rang twice but went to busy signal.

## 2023-07-25 NOTE — TELEPHONE ENCOUNTER
briantcb for patient and his wife.   Patient needs to see gastroenterologist not me for follow-up after recent admission to the hospital and need to speak to them

## 2023-07-26 ENCOUNTER — TELEPHONE (OUTPATIENT)
Dept: INTERNAL MEDICINE CLINIC | Facility: CLINIC | Age: 68
End: 2023-07-26

## 2023-07-26 NOTE — TELEPHONE ENCOUNTER
Spoke with pt,  verified. Pt was informed of Dr Angie Espinal recommendation, pt stated understanding. Pt mentioned he already spoke with Ryan Santo RN from Dr Idris Tobar office () and message was already sent to Dr Idris Tobar. Pt will call back if further assistance is needed.      FYI      Future Appointments   Date Time Provider Tico Enciso   2023 11:20 AM Henrietta Reagan MD [address]

## 2023-07-27 ENCOUNTER — TELEPHONE (OUTPATIENT)
Dept: INTERNAL MEDICINE CLINIC | Facility: CLINIC | Age: 68
End: 2023-07-27

## 2023-07-27 NOTE — TELEPHONE ENCOUNTER
Left message on patient's voicemail that I recommend him to see his gastroenterologist as soon as possible after being treated in [place] in the hospital.  Also sent him email

## 2023-07-31 ENCOUNTER — HOSPITAL ENCOUNTER (OUTPATIENT)
Dept: ULTRASOUND IMAGING | Facility: HOSPITAL | Age: 68
Discharge: HOME | End: 2023-07-31
Attending: INTERNAL MEDICINE
Payer: COMMERCIAL

## 2023-07-31 DIAGNOSIS — K74.60 CIRRHOSIS OF LIVER WITH ASCITES: ICD-10-CM

## 2023-07-31 DIAGNOSIS — R18.8 CIRRHOSIS OF LIVER WITH ASCITES: ICD-10-CM

## 2023-07-31 LAB
DEPRECATED RDW RBC AUTO: 55 FL (ref 35.1–46.3)
ERYTHROCYTE [DISTWIDTH] IN BLOOD BY AUTOMATED COUNT: 15.5 % (ref 11–15)
HCT VFR BLD AUTO: 30 %
HGB BLD-MCNC: 9.4 G/DL
INR BLD: 1.21 (ref 0.85–1.16)
MCH RBC QN AUTO: 30.9 PG (ref 26–34)
MCHC RBC AUTO-ENTMCNC: 31.3 G/DL (ref 31–37)
MCV RBC AUTO: 98.7 FL
PLATELET # BLD AUTO: 84 10(3)UL (ref 150–450)
PROTHROMBIN TIME: 15.1 SECONDS (ref 11.6–14.8)
RBC # BLD AUTO: 3.04 X10(6)UL
WBC # BLD AUTO: 4.1 X10(3) UL (ref 4–11)

## 2023-07-31 PROCEDURE — 85610 PROTHROMBIN TIME: CPT | Performed by: CLINICAL NURSE SPECIALIST

## 2023-07-31 PROCEDURE — P9047 ALBUMIN (HUMAN), 25%, 50ML: HCPCS

## 2023-07-31 PROCEDURE — 49083 ABD PARACENTESIS W/IMAGING: CPT | Performed by: INTERNAL MEDICINE

## 2023-07-31 PROCEDURE — 36415 COLL VENOUS BLD VENIPUNCTURE: CPT | Performed by: CLINICAL NURSE SPECIALIST

## 2023-07-31 PROCEDURE — 85027 COMPLETE CBC AUTOMATED: CPT | Performed by: CLINICAL NURSE SPECIALIST

## 2023-07-31 RX ORDER — ALBUMIN (HUMAN) 12.5 G/50ML
SOLUTION INTRAVENOUS
Status: COMPLETED
Start: 2023-07-31 | End: 2023-07-31

## 2023-07-31 RX ORDER — ALBUMIN (HUMAN) 12.5 G/50ML
100 SOLUTION INTRAVENOUS AS NEEDED
Status: DISCONTINUED | OUTPATIENT
Start: 2023-07-31 | End: 2023-08-02

## 2023-07-31 RX ADMIN — ALBUMIN (HUMAN) 100 ML: 12.5 SOLUTION INTRAVENOUS at 12:01:00

## 2023-07-31 RX ADMIN — ALBUMIN (HUMAN) 100 ML: 12.5 SOLUTION INTRAVENOUS at 11:35:00

## 2023-07-31 NOTE — IMAGING NOTE
1020  Pt to ultrasound room scouts taken by Randell 142    VS /72  HR 87  PULSE OX 96 % ON ROOM AIR    1022  Hx taken ( RADHA) procedure explained questions answered     1025 Patient consented. Pt to get albumin 25% 25 grams yes     [address] APN  Here to access- scanning completed and reviewed. PT TOOK BABY ASA YESTERDAY MMIKOTTIS APRN AWARE    PLATELETS = 84     KA=97.4     INR= 1.21    1059 Timeout taken    1059  Chloro prep  as skin prep sterile drape applied lidocaine 1% 10 milligrams per ml from kit was given for anesthetic affect 5 ml total given. Incision made with scalpel. 1103 5 Singaporean  10 [place] 10 cm beRecruited catheter placed RIGHT LOWER  abdomen    Fluid aspirated for labs  NO     1104  Catheter connected  to tubing then connected to [employer] to [facility]. Draining begins continuously via  Streamway System LIGHT PINK  COLORED FLUID RETURNING     1111 VS /110  HR 75 ~ 1414 ML FLUID RETURNED CURRENTLY    1119  VS /56  HR 76 ~ 3014 ML FLUID RETURNED CURRENTLY    1128  VS /60  HR 79 ~ 5010 ML FLUID RETURNED CURRENTLY    1148 VS /69  HR 86 ~ 7700 ML FLUID RETURNED CURRENTLY    1201 VS /60  HR 81 ~ 8362 ML FLUID RETURNED CURRENTLY    1207 albumin #2 ends /57 HR 81    1201 Draining completed. Patient re scanned. total amount drained 8362ml. Drain was dc'd. Pressure to site for 5 minutes. Area was cleaned steri strips to site. GAUZE AND TEGADERM PLACED       1215 Post instructions was given verbally and written  after visit summary sheet provided to patient.     1216  Streamway system was cleaned after procedure by [address]     6239 Discharged

## 2023-08-01 ENCOUNTER — TELEPHONE (OUTPATIENT)
Dept: SURGERY | Facility: HOSPITAL | Age: 68
End: 2023-08-01

## 2023-08-01 NOTE — TELEPHONE ENCOUNTER
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 Stable until variceal bleed 2 weeks ago in [place]. Decreased diuretics now with weight gain edema ascites s/p LVP. Labs better no signs or symptoms of further decompensation. HE stable. Increase Lasix to 80 mg daily, cont Amiloride to 5 BID. Cont Xifaxan Lactulose. Cont Nadolol. LVP with IV albumin PRN. Return for EGD possible banding. ED MAC. Refer back to NANI Lopez [place] [facility] office. Return 2 months.     Total time spent in face to face time counseling and/or coordination of patient care:  44 minutes

## 2023-08-10 ENCOUNTER — PATIENT OUTREACH (OUTPATIENT)
Dept: CASE MANAGEMENT | Age: 68
End: 2023-08-10

## 2023-08-10 DIAGNOSIS — Z02.9 ENCOUNTERS FOR UNSPECIFIED ADMINISTRATIVE PURPOSE: Primary | ICD-10-CM

## 2023-08-10 NOTE — PROGRESS NOTES
NCM placed call to patient for TCM, LM requesting a call back to [phone] with a condition update.

## 2023-08-11 NOTE — PROGRESS NOTES
Attempted to reach pt for TCM--mailbox full, unable to accept any messages.      Follow up appointments:      Future Appointments      AUG 15   2023  05:15 PM - Appointment  [facility] Radiology - Tasneem Mccall MD      AUG 40   0029  04:30 PM - Appointment  [facility] Cardiology - Tasneem Mccall MD      AUG 28   2023  04:45 PM - Office Visit  Dermatology - Harsha Hernandez MD      AUG 29   2023  09:30 AM - Evaluation  [facility] Transplant Surgery      SEP 9   2023  11:20 AM - Medicare Annual Well Visit  Lindy Silver MD

## 2023-08-12 ENCOUNTER — OFFICE VISIT (OUTPATIENT)
Dept: INTERNAL MEDICINE CLINIC | Facility: CLINIC | Age: 68
End: 2023-08-12

## 2023-08-12 VITALS
HEART RATE: 102 BPM | SYSTOLIC BLOOD PRESSURE: 122 MMHG | BODY MASS INDEX: 44.42 KG/M2 | HEIGHT: 65 IN | RESPIRATION RATE: 18 BRPM | TEMPERATURE: 99 F | DIASTOLIC BLOOD PRESSURE: 79 MMHG | WEIGHT: 266.63 LBS

## 2023-08-12 DIAGNOSIS — R53.83 FATIGUE, UNSPECIFIED TYPE: ICD-10-CM

## 2023-08-12 DIAGNOSIS — K74.60 LIVER CIRRHOSIS SECONDARY TO NASH (HCC): ICD-10-CM

## 2023-08-12 DIAGNOSIS — K70.31 ASCITES DUE TO ALCOHOLIC CIRRHOSIS (HCC): ICD-10-CM

## 2023-08-12 DIAGNOSIS — D50.8 OTHER IRON DEFICIENCY ANEMIA: ICD-10-CM

## 2023-08-12 DIAGNOSIS — K75.81 LIVER CIRRHOSIS SECONDARY TO NASH (HCC): ICD-10-CM

## 2023-08-12 DIAGNOSIS — J30.9 NASAL SINUS INFLAMMATION DUE TO ALLERGY: Primary | ICD-10-CM

## 2023-08-12 DIAGNOSIS — K76.82 HEPATIC ENCEPHALOPATHY (HCC): ICD-10-CM

## 2023-08-12 PROCEDURE — 99214 OFFICE O/P EST MOD 30 MIN: CPT | Performed by: INTERNAL MEDICINE

## 2023-08-12 PROCEDURE — 3078F DIAST BP <80 MM HG: CPT | Performed by: INTERNAL MEDICINE

## 2023-08-12 PROCEDURE — 3074F SYST BP LT 130 MM HG: CPT | Performed by: INTERNAL MEDICINE

## 2023-08-12 PROCEDURE — 3008F BODY MASS INDEX DOCD: CPT | Performed by: INTERNAL MEDICINE

## 2023-08-12 PROCEDURE — 1126F AMNT PAIN NOTED NONE PRSNT: CPT | Performed by: INTERNAL MEDICINE

## 2023-08-12 RX ORDER — FLUTICASONE PROPIONATE 50 MCG
2 SPRAY, SUSPENSION (ML) NASAL DAILY
Qty: 1 EACH | Refills: 1 | Status: SHIPPED | OUTPATIENT
Start: 2023-08-12

## 2023-08-13 PROBLEM — K74.60 ESOPHAGEAL VARICES IN CIRRHOSIS (HCC): Status: ACTIVE | Noted: 2023-08-13

## 2023-08-13 PROBLEM — R05.2 SUBACUTE COUGH: Status: ACTIVE | Noted: 2023-08-13

## 2023-08-13 PROBLEM — I85.10 ESOPHAGEAL VARICES IN CIRRHOSIS (HCC): Status: ACTIVE | Noted: 2023-08-13

## 2023-08-13 PROBLEM — D50.0 IRON DEFICIENCY ANEMIA DUE TO CHRONIC BLOOD LOSS: Status: ACTIVE | Noted: 2023-08-13

## 2023-08-16 ENCOUNTER — TELEPHONE (OUTPATIENT)
Dept: INTERNAL MEDICINE CLINIC | Facility: CLINIC | Age: 68
End: 2023-08-16

## 2023-08-16 NOTE — TELEPHONE ENCOUNTER
Patient was seen on 23. Wife states that he was advised to call back if cough did not improve. Cough is still the same and mostly dry. The tessalon and robitussin did not help. Denies shortness of breath or chest pain. Covid was negative. Wife is requesting an antibiotic. She states that if you need to call her please call her cell at [phone] as they will be downtown for his stress test. Asking for medication to be sent to [facility] in [facility]. 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 [facility])  Plan: Management per GI     (K70.31) Ascites due to alcoholic cirrhosis ([address]  Plan: Reiterated importance of following fluid restriction salt control in the diet monitor weight     (D50.8) Other iron deficiency anemia  Plan: New iron supplementation, CBC needs to be monitored, may need IV iron     (K76.82) Hepatic encephalopathy ([address] doing well today  Plan: Continue lactulose titrate to 2-3 bowel movements a day     (R53.83) Fatigue, unspecified type multifactorial  Prior diabetes advised patient to discuss with gastroenterology if Jardiance appropriate in his situation           Meds This Visit:  Requested Prescriptions             Signed Prescriptions Disp Refills    fluticasone propionate 50 MCG/ACT Nasal Suspension 1 each 1       Si sprays by Each Nare route daily.       Follow-up in 3 months sooner if needed

## 2023-08-16 NOTE — TELEPHONE ENCOUNTER
ciprofloxacin (CIPRO) 500 MG Oral Tab 14 tablet 0 8/16/2023     Sig - Route:  Take 1 tablet (500 mg total) by mouth 2 (two) times daily. - Oral    Sent to pharmacy as: Ciprofloxacin HCl 500 MG Oral Tablet (Cipro)    E-Prescribing Status: Receipt confirmed by pharmacy (8/16/2023  2:26 PM CDT)      [address], [address] [facility],[address] Damon Vee [address] [place] [address]., [phone], [phone]

## 2023-08-21 RX ORDER — REPAGLINIDE 1 MG/1
1 TABLET ORAL
Qty: 270 TABLET | Refills: 3 | Status: SHIPPED | OUTPATIENT
Start: 2023-08-21

## 2023-08-22 NOTE — TELEPHONE ENCOUNTER
Refill passed per Lab21, [facility] protocol.      Requested Prescriptions   Pending Prescriptions Disp Refills    REPAGLINIDE 1 MG Oral Tab [Pharmacy Med Name: REPAGLINIDE 1MG TABLETS] 270 tablet 0     Sig: TAKE 1 TABLET(1 MG) BY MOUTH THREE TIMES DAILY BEFORE MEALS       Diabetes Medication Protocol Passed - 8/20/2023 10:34 PM        Passed - Last A1C < 7.5 and within past 6 months     Lab Results   Component Value Date    A1C 6.9 (H) 04/08/2023             Passed - In person appointment or virtual visit in the past 6 mos or appointment in next 3 mos     Recent Outpatient Visits              1 week ago Nasal sinus inflammation due to allergy    Donnie Lamas [address] [place], [place] [address]Carla MD    Office Visit    1 month ago Attention deficit hyperactivity disorder (ADHD), predominantly inattentive type    Donnie Lamas [facility] Marietta Green [place], MD    Office Visit    4 months ago 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[facility])    Donnie Lamas [facility] Carla Green MD    Office Visit    6 months ago Type 2 diabetes mellitus without complication, without long-term current use of insulin ([address]    Donnie Lamas [facility] Carla Green MD    Office Visit    8 months ago Deviated nasal septum    [address] [facility], Zayra Lopez MD    Office Visit          Future Appointments         Provider Department Appt Notes    [facility] [address] [place] [facility]               Passed - [facility] or GFRNAA > 50     GFR Evaluation  EGFRCR: 72 , resulted on 4/8/2023          Passed - GFR in the past 12 months              Future Appointments         Provider Department Appt Notes    In 2 weeks MD Donnie Chan, [address] [place] [address], [address] [place] [address] [facility]             Recent Outpatient Visits              1 week ago Nasal sinus inflammation due to allergy    Saw Sw[facility]z, Thelma Adams MD    Office Visit    1 month ago Attention deficit hyperactivity disorder (ADHD), predominantly inattentive type    Saw Swartz, Ziyad Lyn MD    Office Visit    4 months ago 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

## 2023-08-23 ENCOUNTER — TELEPHONE (OUTPATIENT)
Dept: INTERNAL MEDICINE CLINIC | Facility: CLINIC | Age: 68
End: 2023-08-23

## 2023-08-24 NOTE — PROGRESS NOTES
Multiple attempts to reach pt and messages left with no return call. Patient went in for HFU appt with PCP on 8/12/23. Encounter closing.

## 2023-08-25 ENCOUNTER — HOSPITAL ENCOUNTER (OUTPATIENT)
Dept: GENERAL RADIOLOGY | Age: 68
Discharge: HOME | End: 2023-08-25
Attending: INTERNAL MEDICINE
Payer: COMMERCIAL

## 2023-08-25 ENCOUNTER — TELEPHONE (OUTPATIENT)
Dept: INTERNAL MEDICINE CLINIC | Facility: CLINIC | Age: 68
End: 2023-08-25

## 2023-08-25 DIAGNOSIS — R05.2 SUBACUTE COUGH: ICD-10-CM

## 2023-08-25 PROCEDURE — 71046 X-RAY EXAM CHEST 2 VIEWS: CPT | Performed by: INTERNAL MEDICINE

## 2023-08-25 NOTE — TELEPHONE ENCOUNTER
Action Requested: Summary for Provider     []  Critical Lab, Recommendations Needed  [x] Need Additional Advice  []   FYI    []   Need Orders  [] Need Medications Sent to Pharmacy  []  Other     SUMMARY: Patient  and wife on the line stated was treated with Cipro for last week for sinus infection has upcoming surgery and is concern with cough, otherwise feel much better    Still with cough which mostly dry but at times has some cloudy white    Asking if he needs to be seen or more treatment    Please advise      Reason for call: No chief complaint on file.   Onset: Data Unavailable Attending

## 2023-08-25 NOTE — TELEPHONE ENCOUNTER
Spoke to patient and relayed Dr. Kalen Marie message below. Patient verbalized understanding and had no further questions. He will go to [place] [facility] location for chest xray today.

## 2023-08-25 NOTE — TELEPHONE ENCOUNTER
I  recommend  pt to have  have  chest x ray today if possible , I cannot add him to my schedule  today  and  am not in the office,I placed order for chest x ray

## 2023-08-28 ENCOUNTER — HOSPITAL ENCOUNTER (OUTPATIENT)
Dept: ULTRASOUND IMAGING | Facility: HOSPITAL | Age: 68
Discharge: HOME | End: 2023-08-28
Attending: INTERNAL MEDICINE
Payer: COMMERCIAL

## 2023-08-28 VITALS — WEIGHT: 264 LBS | BODY MASS INDEX: 43.46 KG/M2 | HEIGHT: 65.5 IN

## 2023-08-28 DIAGNOSIS — R18.8 CIRRHOSIS OF LIVER WITH ASCITES: ICD-10-CM

## 2023-08-28 DIAGNOSIS — K74.60 CIRRHOSIS OF LIVER WITH ASCITES: ICD-10-CM

## 2023-08-28 LAB
INR BLD: 1.14 (ref 0.85–1.16)
PLATELET # BLD AUTO: 70 10(3)UL (ref 150–450)
PROTHROMBIN TIME: 14.6 SECONDS (ref 11.6–14.8)

## 2023-08-28 PROCEDURE — P9047 ALBUMIN (HUMAN), 25%, 50ML: HCPCS

## 2023-08-28 PROCEDURE — 36415 COLL VENOUS BLD VENIPUNCTURE: CPT | Performed by: NURSE PRACTITIONER

## 2023-08-28 PROCEDURE — 85610 PROTHROMBIN TIME: CPT | Performed by: NURSE PRACTITIONER

## 2023-08-28 PROCEDURE — 85049 AUTOMATED PLATELET COUNT: CPT | Performed by: NURSE PRACTITIONER

## 2023-08-28 PROCEDURE — 49083 ABD PARACENTESIS W/IMAGING: CPT | Performed by: INTERNAL MEDICINE

## 2023-08-28 RX ORDER — ALBUMIN (HUMAN) 12.5 G/50ML
100 SOLUTION INTRAVENOUS AS NEEDED
Status: DISCONTINUED | OUTPATIENT
Start: 2023-08-28 | End: 2023-08-30

## 2023-08-28 RX ORDER — ALBUMIN (HUMAN) 12.5 G/50ML
SOLUTION INTRAVENOUS
Status: DISPENSED
Start: 2023-08-28 | End: 2023-08-28

## 2023-08-28 RX ADMIN — ALBUMIN (HUMAN) 100 ML: 12.5 SOLUTION INTRAVENOUS at 11:20:00

## 2023-08-28 RX ADMIN — ALBUMIN (HUMAN) 100 ML: 12.5 SOLUTION INTRAVENOUS at 10:33:00

## 2023-08-28 NOTE — TELEPHONE ENCOUNTER
Spoke to patient on the day of the test, small left pleural effusion reported unchanged from the previous ones otherwise lungs are clear.   Advised patient not to take antibiotic at this point, notify anesthesiologist about recent chest x-ray

## 2023-09-06 ENCOUNTER — TELEPHONE (OUTPATIENT)
Dept: INTERNAL MEDICINE CLINIC | Facility: CLINIC | Age: 68
End: 2023-09-06

## 2023-09-06 NOTE — TELEPHONE ENCOUNTER
Patient wife calling ( identified name and Snellingcarly Jaimeser )patient needs a HFU appointment   Had to cancel appointment oon  due to a      New appointment made     Future Appointments   Date Time Provider Tico Enciso   2023 11:40 AM Sierra Noyola MD [facility] [place]   2023 11:20 AM [facility]

## 2023-09-11 ENCOUNTER — TELEPHONE (OUTPATIENT)
Dept: INTERNAL MEDICINE CLINIC | Facility: CLINIC | Age: 68
End: 2023-09-11

## 2023-09-11 ENCOUNTER — OFFICE VISIT (OUTPATIENT)
Dept: INTERNAL MEDICINE CLINIC | Facility: CLINIC | Age: 68
End: 2023-09-11

## 2023-09-11 ENCOUNTER — LAB ENCOUNTER (OUTPATIENT)
Dept: LAB | Age: 68
End: 2023-09-11
Attending: INTERNAL MEDICINE
Payer: COMMERCIAL

## 2023-09-11 VITALS
RESPIRATION RATE: 16 BRPM | HEART RATE: 91 BPM | SYSTOLIC BLOOD PRESSURE: 127 MMHG | BODY MASS INDEX: 42.34 KG/M2 | OXYGEN SATURATION: 97 % | DIASTOLIC BLOOD PRESSURE: 80 MMHG | WEIGHT: 254.13 LBS | HEIGHT: 65 IN | TEMPERATURE: 98 F

## 2023-09-11 DIAGNOSIS — R29.898 LEG WEAKNESS, BILATERAL: ICD-10-CM

## 2023-09-11 DIAGNOSIS — K75.81 LIVER CIRRHOSIS SECONDARY TO NASH (HCC): ICD-10-CM

## 2023-09-11 DIAGNOSIS — D61.818 PANCYTOPENIA (HCC): ICD-10-CM

## 2023-09-11 DIAGNOSIS — K74.60 LIVER CIRRHOSIS SECONDARY TO NASH (HCC): ICD-10-CM

## 2023-09-11 DIAGNOSIS — R53.1 GENERALIZED WEAKNESS: ICD-10-CM

## 2023-09-11 DIAGNOSIS — J84.10 LUNG GRANULOMA (HCC): ICD-10-CM

## 2023-09-11 DIAGNOSIS — R41.3 MEMORY CHANGES: ICD-10-CM

## 2023-09-11 DIAGNOSIS — F90.0 ATTENTION DEFICIT HYPERACTIVITY DISORDER (ADHD), PREDOMINANTLY INATTENTIVE TYPE: ICD-10-CM

## 2023-09-11 DIAGNOSIS — K76.82 HEPATIC ENCEPHALOPATHY (HCC): Primary | ICD-10-CM

## 2023-09-11 DIAGNOSIS — E11.21 CONTROLLED TYPE 2 DIABETES MELLITUS WITH DIABETIC NEPHROPATHY, WITHOUT LONG-TERM CURRENT USE OF INSULIN (HCC): ICD-10-CM

## 2023-09-11 DIAGNOSIS — D50.0 IRON DEFICIENCY ANEMIA SECONDARY TO BLOOD LOSS (CHRONIC): ICD-10-CM

## 2023-09-11 DIAGNOSIS — K76.82 HEPATIC ENCEPHALOPATHY (HCC): ICD-10-CM

## 2023-09-11 DIAGNOSIS — E11.9 TYPE 2 DIABETES MELLITUS WITHOUT COMPLICATION, WITHOUT LONG-TERM CURRENT USE OF INSULIN (HCC): ICD-10-CM

## 2023-09-11 DIAGNOSIS — E66.01 CLASS 3 SEVERE OBESITY DUE TO EXCESS CALORIES WITH SERIOUS COMORBIDITY AND BODY MASS INDEX (BMI) OF 40.0 TO 44.9 IN ADULT (HCC): ICD-10-CM

## 2023-09-11 PROBLEM — D69.6 PLATELETS DECREASED: Status: ACTIVE | Noted: 2023-09-11

## 2023-09-11 PROBLEM — D69.6 PLATELETS DECREASED (HCC): Status: ACTIVE | Noted: 2023-09-11

## 2023-09-11 LAB
ALBUMIN SERPL-MCNC: 2.7 G/DL (ref 3.4–5)
ALBUMIN/GLOB SERPL: 0.7 {RATIO} (ref 1–2)
ALP LIVER SERPL-CCNC: 121 U/L
ALT SERPL-CCNC: 46 U/L
AMMONIA PLAS-MCNC: 112 UMOL/L (ref 11–32)
ANION GAP SERPL CALC-SCNC: 6 MMOL/L (ref 0–18)
AST SERPL-CCNC: 45 U/L (ref 15–37)
BASOPHILS # BLD AUTO: 0.02 X10(3) UL (ref 0–0.2)
BASOPHILS NFR BLD AUTO: 0.5 %
BILIRUB SERPL-MCNC: 0.7 MG/DL (ref 0.1–2)
BUN BLD-MCNC: 16 MG/DL (ref 7–18)
BUN/CREAT SERPL: 12 (ref 10–20)
CALCIUM BLD-MCNC: 8.6 MG/DL (ref 8.5–10.1)
CHLORIDE SERPL-SCNC: 105 MMOL/L (ref 98–112)
CO2 SERPL-SCNC: 29 MMOL/L (ref 21–32)
CREAT BLD-MCNC: 1.33 MG/DL
DEPRECATED RDW RBC AUTO: 55.2 FL (ref 35.1–46.3)
EGFRCR SERPLBLD CKD-EPI 2021: 58 ML/MIN/1.73M2 (ref 60–?)
EOSINOPHIL # BLD AUTO: 0.09 X10(3) UL (ref 0–0.7)
EOSINOPHIL NFR BLD AUTO: 2.5 %
ERYTHROCYTE [DISTWIDTH] IN BLOOD BY AUTOMATED COUNT: 15.8 % (ref 11–15)
EST. AVERAGE GLUCOSE BLD GHB EST-MCNC: 103 MG/DL (ref 68–126)
FASTING STATUS PATIENT QL REPORTED: NO
GLOBULIN PLAS-MCNC: 4.1 G/DL (ref 2.8–4.4)
GLUCOSE BLD-MCNC: 253 MG/DL (ref 70–99)
HBA1C MFR BLD: 5.2 % (ref ?–5.7)
HCT VFR BLD AUTO: 31.5 %
HGB BLD-MCNC: 9.7 G/DL
IMM GRANULOCYTES # BLD AUTO: 0.01 X10(3) UL (ref 0–1)
IMM GRANULOCYTES NFR BLD: 0.3 %
INR BLD: 1.25 (ref 0.85–1.16)
LYMPHOCYTES # BLD AUTO: 0.29 X10(3) UL (ref 1–4)
LYMPHOCYTES NFR BLD AUTO: 7.9 %
MCH RBC QN AUTO: 29.7 PG (ref 26–34)
MCHC RBC AUTO-ENTMCNC: 30.8 G/DL (ref 31–37)
MCV RBC AUTO: 96.3 FL
MONOCYTES # BLD AUTO: 0.3 X10(3) UL (ref 0.1–1)
MONOCYTES NFR BLD AUTO: 8.2 %
NEUTROPHILS # BLD AUTO: 2.94 X10 (3) UL (ref 1.5–7.7)
NEUTROPHILS # BLD AUTO: 2.94 X10(3) UL (ref 1.5–7.7)
NEUTROPHILS NFR BLD AUTO: 80.6 %
OSMOLALITY SERPL CALC.SUM OF ELEC: 300 MOSM/KG (ref 275–295)
PLATELET # BLD AUTO: 59 10(3)UL (ref 150–450)
POTASSIUM SERPL-SCNC: 3.7 MMOL/L (ref 3.5–5.1)
PROT SERPL-MCNC: 6.8 G/DL (ref 6.4–8.2)
PROTHROMBIN TIME: 15.5 SECONDS (ref 11.6–14.8)
RBC # BLD AUTO: 3.27 X10(6)UL
SODIUM SERPL-SCNC: 140 MMOL/L (ref 136–145)
WBC # BLD AUTO: 3.7 X10(3) UL (ref 4–11)

## 2023-09-11 PROCEDURE — 3008F BODY MASS INDEX DOCD: CPT | Performed by: INTERNAL MEDICINE

## 2023-09-11 PROCEDURE — 3044F HG A1C LEVEL LT 7.0%: CPT | Performed by: INTERNAL MEDICINE

## 2023-09-11 PROCEDURE — 3079F DIAST BP 80-89 MM HG: CPT | Performed by: INTERNAL MEDICINE

## 2023-09-11 PROCEDURE — 1111F DSCHRG MED/CURRENT MED MERGE: CPT | Performed by: INTERNAL MEDICINE

## 2023-09-11 PROCEDURE — 83036 HEMOGLOBIN GLYCOSYLATED A1C: CPT

## 2023-09-11 PROCEDURE — 36415 COLL VENOUS BLD VENIPUNCTURE: CPT

## 2023-09-11 PROCEDURE — 99214 OFFICE O/P EST MOD 30 MIN: CPT | Performed by: INTERNAL MEDICINE

## 2023-09-11 PROCEDURE — 82140 ASSAY OF AMMONIA: CPT

## 2023-09-11 PROCEDURE — 1126F AMNT PAIN NOTED NONE PRSNT: CPT | Performed by: INTERNAL MEDICINE

## 2023-09-11 PROCEDURE — 85610 PROTHROMBIN TIME: CPT

## 2023-09-11 PROCEDURE — 80053 COMPREHEN METABOLIC PANEL: CPT

## 2023-09-11 PROCEDURE — 3074F SYST BP LT 130 MM HG: CPT | Performed by: INTERNAL MEDICINE

## 2023-09-11 PROCEDURE — 85025 COMPLETE CBC W/AUTO DIFF WBC: CPT

## 2023-09-11 RX ORDER — REPAGLINIDE 2 MG/1
2 TABLET ORAL
Qty: 270 TABLET | Refills: 0 | Status: SHIPPED | OUTPATIENT
Start: 2023-09-11

## 2023-09-11 RX ORDER — REPAGLINIDE 2 MG/1
2 TABLET ORAL
Qty: 270 TABLET | Refills: 1 | Status: SHIPPED | OUTPATIENT
Start: 2023-09-11

## 2023-09-11 NOTE — TELEPHONE ENCOUNTER
Patient wife dropped off FMLA forms to be completed HIPAA/COREY signed, fee paid     Patient would like a copy of forms once competed. Call patient when ready.

## 2023-09-14 NOTE — TELEPHONE ENCOUNTER
FMLA forms received and logged for processing. COREY is incomplete, no fax # provided. Bdayt message sent to pt.

## 2023-09-15 ENCOUNTER — TELEPHONE (OUTPATIENT)
Dept: INTERNAL MEDICINE CLINIC | Facility: CLINIC | Age: 68
End: 2023-09-15

## 2023-09-23 ENCOUNTER — LAB ENCOUNTER (OUTPATIENT)
Dept: LAB | Age: 68
End: 2023-09-23
Attending: INTERNAL MEDICINE
Payer: COMMERCIAL

## 2023-09-23 ENCOUNTER — OFFICE VISIT (OUTPATIENT)
Dept: INTERNAL MEDICINE CLINIC | Facility: CLINIC | Age: 68
End: 2023-09-23

## 2023-09-23 VITALS
HEART RATE: 90 BPM | DIASTOLIC BLOOD PRESSURE: 63 MMHG | WEIGHT: 262 LBS | SYSTOLIC BLOOD PRESSURE: 98 MMHG | BODY MASS INDEX: 43.65 KG/M2 | HEIGHT: 65 IN

## 2023-09-23 DIAGNOSIS — K74.60 LIVER CIRRHOSIS SECONDARY TO NASH (HCC): ICD-10-CM

## 2023-09-23 DIAGNOSIS — N39.0 URINARY TRACT INFECTION WITHOUT HEMATURIA, SITE UNSPECIFIED: ICD-10-CM

## 2023-09-23 DIAGNOSIS — K76.82 HEPATIC ENCEPHALOPATHY (HCC): ICD-10-CM

## 2023-09-23 DIAGNOSIS — D61.818 PANCYTOPENIA (HCC): ICD-10-CM

## 2023-09-23 DIAGNOSIS — K74.60 LIVER CIRRHOSIS SECONDARY TO NASH (HCC): Primary | ICD-10-CM

## 2023-09-23 DIAGNOSIS — K75.81 LIVER CIRRHOSIS SECONDARY TO NASH (HCC): ICD-10-CM

## 2023-09-23 DIAGNOSIS — K75.81 LIVER CIRRHOSIS SECONDARY TO NASH (HCC): Primary | ICD-10-CM

## 2023-09-23 LAB
ALBUMIN SERPL-MCNC: 2.7 G/DL (ref 3.4–5)
ALBUMIN/GLOB SERPL: 0.7 {RATIO} (ref 1–2)
ALP LIVER SERPL-CCNC: 117 U/L
ALT SERPL-CCNC: 46 U/L
AMMONIA PLAS-MCNC: 52 UMOL/L (ref 11–32)
ANION GAP SERPL CALC-SCNC: 8 MMOL/L (ref 0–18)
AST SERPL-CCNC: 51 U/L (ref 15–37)
BASOPHILS # BLD AUTO: 0 X10(3) UL (ref 0–0.2)
BASOPHILS NFR BLD AUTO: 0 %
BILIRUB SERPL-MCNC: 1.1 MG/DL (ref 0.1–2)
BILIRUB UR QL: NEGATIVE
BUN BLD-MCNC: 11 MG/DL (ref 7–18)
BUN/CREAT SERPL: 10.9 (ref 10–20)
CALCIUM BLD-MCNC: 8.6 MG/DL (ref 8.5–10.1)
CHLORIDE SERPL-SCNC: 108 MMOL/L (ref 98–112)
CO2 SERPL-SCNC: 25 MMOL/L (ref 21–32)
COLOR UR: YELLOW
CREAT BLD-MCNC: 1.01 MG/DL
DEPRECATED RDW RBC AUTO: 54.5 FL (ref 35.1–46.3)
EGFRCR SERPLBLD CKD-EPI 2021: 81 ML/MIN/1.73M2 (ref 60–?)
EOSINOPHIL # BLD AUTO: 0.12 X10(3) UL (ref 0–0.7)
EOSINOPHIL NFR BLD AUTO: 3.4 %
ERYTHROCYTE [DISTWIDTH] IN BLOOD BY AUTOMATED COUNT: 15.9 % (ref 11–15)
FASTING STATUS PATIENT QL REPORTED: NO
GLOBULIN PLAS-MCNC: 4 G/DL (ref 2.8–4.4)
GLUCOSE BLD-MCNC: 105 MG/DL (ref 70–99)
GLUCOSE UR-MCNC: NORMAL MG/DL
HCT VFR BLD AUTO: 30.3 %
HGB BLD-MCNC: 9.5 G/DL
IMM GRANULOCYTES # BLD AUTO: 0.01 X10(3) UL (ref 0–1)
IMM GRANULOCYTES NFR BLD: 0.3 %
KETONES UR-MCNC: NEGATIVE MG/DL
LEUKOCYTE ESTERASE UR QL STRIP.AUTO: 500
LYMPHOCYTES # BLD AUTO: 0.29 X10(3) UL (ref 1–4)
LYMPHOCYTES NFR BLD AUTO: 8.1 %
MCH RBC QN AUTO: 29.8 PG (ref 26–34)
MCHC RBC AUTO-ENTMCNC: 31.4 G/DL (ref 31–37)
MCV RBC AUTO: 95 FL
MONOCYTES # BLD AUTO: 0.44 X10(3) UL (ref 0.1–1)
MONOCYTES NFR BLD AUTO: 12.3 %
NEUTROPHILS # BLD AUTO: 2.71 X10 (3) UL (ref 1.5–7.7)
NEUTROPHILS # BLD AUTO: 2.71 X10(3) UL (ref 1.5–7.7)
NEUTROPHILS NFR BLD AUTO: 75.9 %
NITRITE UR QL STRIP.AUTO: NEGATIVE
OSMOLALITY SERPL CALC.SUM OF ELEC: 292 MOSM/KG (ref 275–295)
PH UR: 6 [PH] (ref 5–8)
PLATELET # BLD AUTO: 57 10(3)UL (ref 150–450)
POTASSIUM SERPL-SCNC: 3.2 MMOL/L (ref 3.5–5.1)
PROT SERPL-MCNC: 6.7 G/DL (ref 6.4–8.2)
PROT UR-MCNC: 100 MG/DL
RBC # BLD AUTO: 3.19 X10(6)UL
RBC #/AREA URNS AUTO: >10 /HPF
SODIUM SERPL-SCNC: 141 MMOL/L (ref 136–145)
SP GR UR STRIP: 1.01 (ref 1–1.03)
UROBILINOGEN UR STRIP-ACNC: NORMAL
WBC # BLD AUTO: 3.6 X10(3) UL (ref 4–11)
WBC #/AREA URNS AUTO: >50 /HPF
WBC CLUMPS UR QL AUTO: PRESENT /HPF

## 2023-09-23 PROCEDURE — 87086 URINE CULTURE/COLONY COUNT: CPT

## 2023-09-23 PROCEDURE — 1126F AMNT PAIN NOTED NONE PRSNT: CPT | Performed by: INTERNAL MEDICINE

## 2023-09-23 PROCEDURE — 80053 COMPREHEN METABOLIC PANEL: CPT

## 2023-09-23 PROCEDURE — 3078F DIAST BP <80 MM HG: CPT | Performed by: INTERNAL MEDICINE

## 2023-09-23 PROCEDURE — 87186 SC STD MICRODIL/AGAR DIL: CPT

## 2023-09-23 PROCEDURE — 87077 CULTURE AEROBIC IDENTIFY: CPT

## 2023-09-23 PROCEDURE — 99214 OFFICE O/P EST MOD 30 MIN: CPT | Performed by: INTERNAL MEDICINE

## 2023-09-23 PROCEDURE — 3074F SYST BP LT 130 MM HG: CPT | Performed by: INTERNAL MEDICINE

## 2023-09-23 PROCEDURE — 81001 URINALYSIS AUTO W/SCOPE: CPT

## 2023-09-23 PROCEDURE — 82140 ASSAY OF AMMONIA: CPT

## 2023-09-23 PROCEDURE — 3008F BODY MASS INDEX DOCD: CPT | Performed by: INTERNAL MEDICINE

## 2023-09-23 PROCEDURE — 85025 COMPLETE CBC W/AUTO DIFF WBC: CPT

## 2023-09-23 PROCEDURE — 36415 COLL VENOUS BLD VENIPUNCTURE: CPT

## 2023-09-23 RX ORDER — CHLORAL HYDRATE 500 MG
1000 CAPSULE ORAL NIGHTLY
COMMUNITY

## 2023-09-23 RX ORDER — PANTOPRAZOLE SODIUM 40 MG/1
40 TABLET, DELAYED RELEASE ORAL DAILY
COMMUNITY
Start: 2023-08-04

## 2023-09-23 RX ORDER — MIDODRINE HYDROCHLORIDE 5 MG/1
5 TABLET ORAL 3 TIMES DAILY
COMMUNITY

## 2023-09-23 RX ORDER — FERROUS SULFATE TAB EC 324 MG (65 MG FE EQUIVALENT) 324 (65 FE) MG
1 TABLET DELAYED RESPONSE ORAL NIGHTLY
COMMUNITY

## 2023-09-25 ENCOUNTER — TELEPHONE (OUTPATIENT)
Dept: INTERNAL MEDICINE CLINIC | Facility: CLINIC | Age: 68
End: 2023-09-25

## 2023-09-25 ENCOUNTER — MED REC SCAN ONLY (OUTPATIENT)
Age: 68
End: 2023-09-25

## 2023-09-25 NOTE — TELEPHONE ENCOUNTER
Type of Leave:  Continuous & Intermittent FMLA  Reason for Leave:  Procedures (for pt's liver) & recovery  Start date of leave:  8/21/23  How much time needed?:  8/21/23-9/29/23 (continuous), 10/2/23-6 months (intermittent)  Forms Due Date:  Not given  Was Fee and Turnaround info Given?:  Yes, 10-15 business day turn around time    Intermittent request: 1-4 flare ups a month, each episode lasting 1-2 days and 1-2 appts a month, each appt lasting 1-8 hours (with travel time to [facility])

## 2023-09-25 NOTE — TELEPHONE ENCOUNTER
Moe Stewart from [facility] [address] called and states she is a nurse  and she would like to speak to  about post discharge follow up and gaps within the patients care.

## 2023-09-26 NOTE — TELEPHONE ENCOUNTER
Dr. Susy Ramey     Please sign off on form if you agree to: Spouse FMLA 08/21/2023-09/29/2023 Cont. & 10/02/2023-6mths Intermittent   (Please place your signature on the first page only)    -From your Inbasket, Highlight the patient and click Chart   -Double click the 44/46/1005 Forms Completion telephone encounter  -Scroll down to the Media section   -Click the blue Hyperlink: Kiki Ramey [birth year]  -Click Acknowledge located at the bottom right corner (if you do not see acknowledge, try maximizing your window)   -Drag the mouse into the blank space of the document and a + sign will appear. Left click to   electronically sign the document.      Thank you,  Zain Pinto.

## 2023-09-29 NOTE — TELEPHONE ENCOUNTER
Pt wife calling back confirming fax number to send forms [phone]. Let her know that is hwere forms were faxed multiple times without success. Requesting both sets of forms be uploaded to [address] [place] [address] [address] and can also  at the providers office if needed.

## 2023-09-29 NOTE — TELEPHONE ENCOUNTER
Forms have been completed, have attempted to fax to Edson Lal FMLA forms to her Employer @ [employer], 3 times over 2 days and have been unsuccessful, double/ triple checked the fax # of [phone] but keep getting Failure to send rcpt. Attempted to reach pt by Phone number listed as well as mobile line, Left msg on [phone]/ # [phone] however [phone] VCM is full and unable to leave msg. Will also send MyChart regarding completion so Pt. May Advise on next steps.

## 2023-09-29 NOTE — TELEPHONE ENCOUNTER
Per pt wife Tali Cody would jackie Forms uploaded to [facility], Uploaded completed [facility] forms as requested to Marcella [facility] [employer]

## 2023-10-03 ENCOUNTER — TELEPHONE (OUTPATIENT)
Dept: INTERNAL MEDICINE CLINIC | Facility: CLINIC | Age: 68
End: 2023-10-03

## 2023-10-03 NOTE — TELEPHONE ENCOUNTER
Patient Army Breed calling ( identified name and  )  patient saw diabetic APRN today  and was told to contact PCP due to bilateral LE edema, states the edema is from thigh  to near ankles     APRN suggested to wear compression stockings     Patient and APRN both feel the edema has increased greatly over the past week , the skin is tight and  especially the left leg; left leg also appears to be red in color ,trying to take Lactulose as directed      LOV   with Dr. 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

## 2023-10-04 NOTE — TELEPHONE ENCOUNTER
Attempted to call patient not available, wife is teaching class right now we will try to talk between 1030 and 11:45 AM when she has a break

## 2023-10-06 ENCOUNTER — LAB ENCOUNTER (OUTPATIENT)
Dept: LAB | Facility: HOSPITAL | Age: 68
End: 2023-10-06
Attending: INTERNAL MEDICINE
Payer: COMMERCIAL

## 2023-10-06 ENCOUNTER — OFFICE VISIT (OUTPATIENT)
Dept: INTERNAL MEDICINE CLINIC | Facility: CLINIC | Age: 68
End: 2023-10-06

## 2023-10-06 ENCOUNTER — HOSPITAL ENCOUNTER (OUTPATIENT)
Dept: ULTRASOUND IMAGING | Facility: HOSPITAL | Age: 68
Discharge: HOME | End: 2023-10-06
Attending: INTERNAL MEDICINE
Payer: COMMERCIAL

## 2023-10-06 VITALS
HEART RATE: 78 BPM | SYSTOLIC BLOOD PRESSURE: 118 MMHG | HEIGHT: 65 IN | WEIGHT: 272.63 LBS | RESPIRATION RATE: 16 BRPM | BODY MASS INDEX: 45.42 KG/M2 | DIASTOLIC BLOOD PRESSURE: 67 MMHG

## 2023-10-06 DIAGNOSIS — D61.818 PANCYTOPENIA (HCC): ICD-10-CM

## 2023-10-06 DIAGNOSIS — M79.605 BILATERAL LEG PAIN: ICD-10-CM

## 2023-10-06 DIAGNOSIS — R60.0 BILATERAL LEG EDEMA: ICD-10-CM

## 2023-10-06 DIAGNOSIS — K76.82 HEPATIC ENCEPHALOPATHY (HCC): ICD-10-CM

## 2023-10-06 DIAGNOSIS — R39.9 UTI SYMPTOMS: ICD-10-CM

## 2023-10-06 DIAGNOSIS — E87.6 HYPOKALEMIA: ICD-10-CM

## 2023-10-06 DIAGNOSIS — M79.604 BILATERAL LEG PAIN: ICD-10-CM

## 2023-10-06 DIAGNOSIS — K76.6 PORTAL HYPERTENSION (HCC): ICD-10-CM

## 2023-10-06 DIAGNOSIS — R60.0 BILATERAL LEG EDEMA: Primary | ICD-10-CM

## 2023-10-06 LAB
ALBUMIN SERPL-MCNC: 2.4 G/DL (ref 3.4–5)
ALBUMIN/GLOB SERPL: 0.6 {RATIO} (ref 1–2)
ALP LIVER SERPL-CCNC: 101 U/L
ALT SERPL-CCNC: 39 U/L
ANION GAP SERPL CALC-SCNC: 2 MMOL/L (ref 0–18)
AST SERPL-CCNC: 60 U/L (ref 15–37)
BASOPHILS # BLD AUTO: 0.02 X10(3) UL (ref 0–0.2)
BASOPHILS NFR BLD AUTO: 0.4 %
BILIRUB SERPL-MCNC: 0.8 MG/DL (ref 0.1–2)
BILIRUBIN: NEGATIVE
BUN BLD-MCNC: 17 MG/DL (ref 7–18)
BUN/CREAT SERPL: 13.6 (ref 10–20)
CALCIUM BLD-MCNC: 8.9 MG/DL (ref 8.5–10.1)
CHLORIDE SERPL-SCNC: 110 MMOL/L (ref 98–112)
CO2 SERPL-SCNC: 32 MMOL/L (ref 21–32)
CREAT BLD-MCNC: 1.25 MG/DL
DEPRECATED RDW RBC AUTO: 54.4 FL (ref 35.1–46.3)
EGFRCR SERPLBLD CKD-EPI 2021: 63 ML/MIN/1.73M2 (ref 60–?)
EOSINOPHIL # BLD AUTO: 0.19 X10(3) UL (ref 0–0.7)
EOSINOPHIL NFR BLD AUTO: 4.1 %
ERYTHROCYTE [DISTWIDTH] IN BLOOD BY AUTOMATED COUNT: 16 % (ref 11–15)
FASTING STATUS PATIENT QL REPORTED: NO
GLOBULIN PLAS-MCNC: 4.3 G/DL (ref 2.8–4.4)
GLUCOSE (URINE DIPSTICK): NEGATIVE MG/DL
GLUCOSE BLD-MCNC: 153 MG/DL (ref 70–99)
HCT VFR BLD AUTO: 31.3 %
HGB BLD-MCNC: 9.9 G/DL
IMM GRANULOCYTES # BLD AUTO: 0.01 X10(3) UL (ref 0–1)
IMM GRANULOCYTES NFR BLD: 0.2 %
KETONES (URINE DIPSTICK): NEGATIVE MG/DL
LYMPHOCYTES # BLD AUTO: 0.36 X10(3) UL (ref 1–4)
LYMPHOCYTES NFR BLD AUTO: 7.8 %
MCH RBC QN AUTO: 29.6 PG (ref 26–34)
MCHC RBC AUTO-ENTMCNC: 31.6 G/DL (ref 31–37)
MCV RBC AUTO: 93.4 FL
MONOCYTES # BLD AUTO: 0.65 X10(3) UL (ref 0.1–1)
MONOCYTES NFR BLD AUTO: 14.1 %
MULTISTIX LOT#: ABNORMAL NUMERIC
NEUTROPHILS # BLD AUTO: 3.37 X10 (3) UL (ref 1.5–7.7)
NEUTROPHILS # BLD AUTO: 3.37 X10(3) UL (ref 1.5–7.7)
NEUTROPHILS NFR BLD AUTO: 73.4 %
NITRITE, URINE: NEGATIVE
OSMOLALITY SERPL CALC.SUM OF ELEC: 303 MOSM/KG (ref 275–295)
PH, URINE: 7 (ref 4.5–8)
PLATELET # BLD AUTO: 62 10(3)UL (ref 150–450)
POTASSIUM SERPL-SCNC: 4.9 MMOL/L (ref 3.5–5.1)
PROT SERPL-MCNC: 6.7 G/DL (ref 6.4–8.2)
PROTEIN (URINE DIPSTICK): 100 MG/DL
RBC # BLD AUTO: 3.35 X10(6)UL
SODIUM SERPL-SCNC: 144 MMOL/L (ref 136–145)
SPECIFIC GRAVITY: 1.01 (ref 1–1.03)
URINE-COLOR: YELLOW
UROBILINOGEN,SEMI-QN: 0.2 MG/DL (ref 0–1.9)
WBC # BLD AUTO: 4.6 X10(3) UL (ref 4–11)

## 2023-10-06 PROCEDURE — 3008F BODY MASS INDEX DOCD: CPT | Performed by: INTERNAL MEDICINE

## 2023-10-06 PROCEDURE — 99214 OFFICE O/P EST MOD 30 MIN: CPT | Performed by: INTERNAL MEDICINE

## 2023-10-06 PROCEDURE — 80053 COMPREHEN METABOLIC PANEL: CPT

## 2023-10-06 PROCEDURE — 93970 EXTREMITY STUDY: CPT | Performed by: INTERNAL MEDICINE

## 2023-10-06 PROCEDURE — 3078F DIAST BP <80 MM HG: CPT | Performed by: INTERNAL MEDICINE

## 2023-10-06 PROCEDURE — 85025 COMPLETE CBC W/AUTO DIFF WBC: CPT

## 2023-10-06 PROCEDURE — 3074F SYST BP LT 130 MM HG: CPT | Performed by: INTERNAL MEDICINE

## 2023-10-06 PROCEDURE — 1126F AMNT PAIN NOTED NONE PRSNT: CPT | Performed by: INTERNAL MEDICINE

## 2023-10-06 PROCEDURE — 36415 COLL VENOUS BLD VENIPUNCTURE: CPT

## 2023-10-06 PROCEDURE — 81003 URINALYSIS AUTO W/O SCOPE: CPT | Performed by: INTERNAL MEDICINE

## 2023-10-09 ENCOUNTER — OFFICE VISIT (OUTPATIENT)
Dept: INTERNAL MEDICINE CLINIC | Facility: CLINIC | Age: 68
End: 2023-10-09

## 2023-10-09 ENCOUNTER — NURSE TRIAGE (OUTPATIENT)
Dept: INTERNAL MEDICINE CLINIC | Facility: CLINIC | Age: 68
End: 2023-10-09

## 2023-10-09 VITALS
SYSTOLIC BLOOD PRESSURE: 128 MMHG | HEART RATE: 94 BPM | DIASTOLIC BLOOD PRESSURE: 62 MMHG | OXYGEN SATURATION: 97 % | RESPIRATION RATE: 20 BRPM | TEMPERATURE: 99 F | HEIGHT: 65 IN | BODY MASS INDEX: 44.98 KG/M2 | WEIGHT: 270 LBS

## 2023-10-09 DIAGNOSIS — K75.81 LIVER CIRRHOSIS SECONDARY TO NASH (HCC): ICD-10-CM

## 2023-10-09 DIAGNOSIS — R60.0 BILATERAL LEG EDEMA: Primary | ICD-10-CM

## 2023-10-09 DIAGNOSIS — M79.605 BILATERAL LEG PAIN: ICD-10-CM

## 2023-10-09 DIAGNOSIS — K74.60 LIVER CIRRHOSIS SECONDARY TO NASH (HCC): ICD-10-CM

## 2023-10-09 DIAGNOSIS — M79.604 BILATERAL LEG PAIN: ICD-10-CM

## 2023-10-09 PROCEDURE — 3074F SYST BP LT 130 MM HG: CPT | Performed by: INTERNAL MEDICINE

## 2023-10-09 PROCEDURE — 1125F AMNT PAIN NOTED PAIN PRSNT: CPT | Performed by: INTERNAL MEDICINE

## 2023-10-09 PROCEDURE — 3078F DIAST BP <80 MM HG: CPT | Performed by: INTERNAL MEDICINE

## 2023-10-09 PROCEDURE — 3008F BODY MASS INDEX DOCD: CPT | Performed by: INTERNAL MEDICINE

## 2023-10-09 PROCEDURE — 99214 OFFICE O/P EST MOD 30 MIN: CPT | Performed by: INTERNAL MEDICINE

## 2023-10-09 NOTE — TELEPHONE ENCOUNTER
Future Appointments   Date Time Provider Tico Enciso   10/9/2023  2:30 PM Jl Khoury MD [facility]   11/4/2023 11:20 AM Ranjit Sinclair MD [place] [address]

## 2023-10-09 NOTE — TELEPHONE ENCOUNTER
Action Requested: Summary for Provider     []  Critical Lab, Recommendations Needed  [] Need Additional Advice  []   FYI    []   Need Orders  [] Need Medications Sent to Pharmacy  [x]  Other     SUMMARY:   Wife of patient (on COREY) calling to request appointment for patient to be evaluated related to rash with blisters in bilateral lower extremities.     Appointment scheduled:  Future Appointments   Date Time Provider Tico Enciso   10/9/2023  2:30 PM Ariadna Person MD [facility]   11/4/2023 11:20 AM Chante Parkinson MD [address]         Reason for call: Acute  Onset: Data Unavailable    Reason for Disposition   Patient wants to be seen    Protocols used: Rash or Redness - [id number]

## 2023-10-13 NOTE — PROGRESS NOTES
Subjective:   Patient ID: Antonieta Cespedes is a 76year old male. HPI  Patient comes in today with complaint of bilateral leg swelling and redness getting worse feels like developing blisters seen PCP on Friday PCP was not too concerned but she says now looks like the swelling has gotten worse patient has liver cirrhosis recently had TIPS performed at another facility    History/Other:   Review of Systems   Constitutional: Negative. Negative for fatigue and fever. HENT: Negative. Negative for congestion. Eyes: Negative. Respiratory: Negative. Negative for cough, shortness of breath and wheezing. Cardiovascular:  Positive for leg swelling. Negative for chest pain and palpitations. Gastrointestinal: Negative. Endocrine: Negative for cold intolerance and heat intolerance. Genitourinary: Negative. Negative for dysuria, flank pain and hematuria. Musculoskeletal: Negative. Negative for arthralgias, back pain and myalgias. Skin:  Positive for color change, pallor and rash. Neurological: Negative. Negative for dizziness, tremors, syncope, weakness and headaches. Psychiatric/Behavioral: Negative. Negative for agitation, behavioral problems and suicidal ideas. The patient is not nervous/anxious. Current Outpatient Medications   Medication Sig Dispense Refill    cefadroxil 500 MG Oral Cap Take 1 capsule (500 mg total) by mouth 2 (two) times daily. 20 capsule 0    nitrofurantoin monohydrate macro 100 MG Oral Cap Take 1 tablet p.o. every 12 hours 14 capsule 0    Potassium Chloride ER 20 MEQ Oral Tab CR Take 1 tablet p.o. twice a day for 2 days, continue 1 tablet daily afterwards 90 tablet 0    Ferrous Sulfate 324 (65 Fe) MG Oral Tab EC Take 1 tablet by mouth at bedtime. omega-3 fatty acids 1000 MG Oral Cap Take 1,000 mg by mouth at bedtime.       midodrine 5 MG Oral Tab Take 1 tablet (5 mg total) by mouth in the morning and 1 tablet (5 mg total) at noon and 1 tablet (5 mg total) in the evening. Multiple Vitamin (MULTIVITAMIN ADULT OR) Take 1 tablet by mouth daily. pantoprazole 40 MG Oral Tab EC Take 1 tablet (40 mg total) by mouth daily. Probiotic Product (PROBIOTIC-10 OR) Take 1 capsule by mouth daily. Repaglinide 2 MG Oral Tab Take 1 tablet (2 mg total) by mouth 3 (three) times daily before meals. (Patient taking differently: Take 0.5 tablets (1 mg total) by mouth 3 (three) times daily before meals.) 270 tablet 0    fluticasone propionate 50 MCG/ACT Nasal Suspension 2 sprays by Each Nare route daily. 1 each 1    Amphetamine-Dextroamphet ER (ADDERALL XR) 25 MG Oral Capsule SR 24 Hr Take 1 capsule (25 mg total) by mouth every morning. 30 capsule 0    escitalopram 10 MG Oral Tab Take 1 tablet (10 mg total) by mouth daily. 90 tablet 1    Continuous Blood Gluc Sensor (FREESTYLE GOLD 3 SENSOR) Does not apply Misc 1 each daily. 1 each 11    furosemide 80 MG Oral Tab Take 1 tablet (80 mg total) by mouth 2 (two) times daily. 180 tablet 1    lactulose (GENERLAC) 10 GM/15ML Oral Solution TAKE 30ML BY MOUTH TWICE DAILY (Patient taking differently: TAKE 30ML BY MOUTH THREE DAILY) 946 mL 11    rifaximin (XIFAXAN) 550 MG Oral Tab Take 1 tablet (550 mg total) by mouth 2 (two) times daily. 60 tablet 11    aMILoride 5 MG Oral Tab Take 2 tablets (10 mg total) by mouth 2 (two) times a day. The patient is to take 20mg per day (Patient taking differently: Take 1 tablet (5 mg total) by mouth in the morning and 1 tablet (5 mg total) before bedtime.) 120 tablet 3    nadolol 20 MG Oral Tab Take 1 tablet (20 mg total) by mouth daily. 30 tablet 11    Psyllium 0.52 g Oral Cap       hydrocortisone 2.5 % External Cream 1 yonis - Face      Clobetasol Propionate 0.05 % External Liquid Apply 125 mL topically As Directed. Allergies:  Amoxicillin             DIARRHEA    Objective:   Physical Exam  Vitals and nursing note reviewed. Constitutional:       Appearance: He is well-developed.    HENT: Head: Normocephalic and atraumatic. Right Ear: External ear normal.      Left Ear: External ear normal.      Nose: Nose normal.   Eyes:      Conjunctiva/sclera: Conjunctivae normal.      Pupils: Pupils are equal, round, and reactive to light. Cardiovascular:      Rate and Rhythm: Normal rate and regular rhythm. Heart sounds: Normal heart sounds. Pulmonary:      Effort: Pulmonary effort is normal.      Breath sounds: Normal breath sounds. Abdominal:      General: Bowel sounds are normal.      Palpations: Abdomen is soft. Genitourinary:     Penis: Normal.       Prostate: Normal.      Rectum: Normal.   Musculoskeletal:         General: Swelling present. Normal range of motion. Cervical back: Normal range of motion and neck supple. Skin:     General: Skin is warm and dry. Findings: Erythema and rash present. Neurological:      Mental Status: He is alert and oriented to person, place, and time. Deep Tendon Reflexes: Reflexes are normal and symmetric. 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       Meds This Visit:  Requested Prescriptions      No prescriptions requested or ordered in this encounter       Imaging & Referrals:  None

## 2023-10-18 NOTE — TELEPHONE ENCOUNTER
Pt want to  his script for Aderrall ER 25mg; he states Dr Roberto Hobson gives him a paper script to pt can take to any pharmacy. Call pt at: [phone] okay to leave a voice message He only has two days left.

## 2023-10-19 ENCOUNTER — TELEPHONE (OUTPATIENT)
Dept: SURGERY | Facility: HOSPITAL | Age: 68
End: 2023-10-19

## 2023-10-19 NOTE — TELEPHONE ENCOUNTER
ASSESSMENT AND PLAN:   Claire Herrera is a 76year old male with GARCIA cirrhosis. Had recurrent variceal bleed with TIPS 8/23. Hx HE on med Rx. Notes periph edema, recent increase in Bumex. US no mass TIPS patent. Continue Lactulose, Xifaxan. Cont Bumex 2 mg BID. Labs next week  Return 6 months.

## 2023-10-20 RX ORDER — DEXTROAMPHETAMINE SACCHARATE, AMPHETAMINE ASPARTATE MONOHYDRATE, DEXTROAMPHETAMINE SULFATE AND AMPHETAMINE SULFATE 6.25; 6.25; 6.25; 6.25 MG/1; MG/1; MG/1; MG/1
25 CAPSULE, EXTENDED RELEASE ORAL EVERY MORNING
Qty: 30 CAPSULE | Refills: 0 | Status: SHIPPED | OUTPATIENT
Start: 2023-10-20 | End: 2023-10-21

## 2023-10-20 NOTE — TELEPHONE ENCOUNTER
Call from wife Wil Miranda, on [facility]ORE[facility], patient is with her, verified name/. Asking for script for his Adderall XR 25 mg. Asking if they can get scripts for 3 months so they can take to a pharmacy where it is in stock. Still looking around. He will be out of med tomorrow. Dr Nikki Curtis, please advise, wife can  Saturday morning in [place].

## 2023-10-21 ENCOUNTER — TELEPHONE (OUTPATIENT)
Dept: INTERNAL MEDICINE CLINIC | Facility: CLINIC | Age: 68
End: 2023-10-21

## 2023-10-21 RX ORDER — DEXTROAMPHETAMINE SACCHARATE, AMPHETAMINE ASPARTATE MONOHYDRATE, DEXTROAMPHETAMINE SULFATE AND AMPHETAMINE SULFATE 6.25; 6.25; 6.25; 6.25 MG/1; MG/1; MG/1; MG/1
25 CAPSULE, EXTENDED RELEASE ORAL EVERY MORNING
Qty: 30 CAPSULE | Refills: 0 | Status: SHIPPED | OUTPATIENT
Start: 2023-10-21

## 2023-10-21 RX ORDER — DEXTROAMPHETAMINE SACCHARATE, AMPHETAMINE ASPARTATE MONOHYDRATE, DEXTROAMPHETAMINE SULFATE AND AMPHETAMINE SULFATE 6.25; 6.25; 6.25; 6.25 MG/1; MG/1; MG/1; MG/1
25 CAPSULE, EXTENDED RELEASE ORAL EVERY MORNING
Qty: 30 CAPSULE | Refills: 0 | Status: SHIPPED | OUTPATIENT
Start: 2023-10-21 | End: 2023-10-21

## 2023-10-21 NOTE — TELEPHONE ENCOUNTER
Pt's wife is requesting adderall to be sent to [facility] in [facility] 76. does not have the script strength.   Pended new med order with updated pharmacy for approval.

## 2023-10-30 ENCOUNTER — TELEPHONE (OUTPATIENT)
Dept: INTERNAL MEDICINE CLINIC | Facility: CLINIC | Age: 68
End: 2023-10-30

## 2023-10-30 NOTE — TELEPHONE ENCOUNTER
Patient wife Lisette Wick and patient calling ( identified name and  )   Reports patient having higher BG than normal   Has been using a new meter Dexacom Ernie  since  late Friday 10/27  meter read HIGH when he placed the meter on his arm     Advised to check BG now  while on this call via Dexacom ; [id number]    Patient also checked with regular Glucometer , BG =  unable to use it at this time     BG readings:      10/28-- 11:29pm = 350    11:48pm =371  10/29 1:17am  HIGH   2:49pm =321   10:24pm= 322  10/30--  12:20am 358   Now =  219    Has been feeling tired, sluggish but also has not been taking his Lactulose as schedule ( Hx of GARCIA )   Advised to drink extra water,  walk around, take Lactulose as directed ,and eat balanced diet     Asking if need to be seen today  (  does have appointment on Saturday )     Or any other recommendations   Future Appointments   Date Time Provider Tico Enciso   2023 11:20 AM Silverio Collado MD [facility] [place]         Please advise and thank you.       Best call back number: Lisette Wick  at [phone]

## 2023-10-30 NOTE — TELEPHONE ENCOUNTER
Spoke to patient, advised him to eliminate sugar from his diet he has tendency to eat several candies a day, he used to be on [employer] and now he is eating food prepared by his wife, he could not tell me if he takes any new medications, he does not report signs of UTI, will call later and try to speak to his wife and he agreed with the plan

## 2023-10-31 NOTE — TELEPHONE ENCOUNTER
Spoke to wife, she is trying to watch diet and meals for patient, will plan to put him back in [address] [place] [address].   I advised that he can increase repaglinide and take full tablet 2 mg 2 -3 times a day before meals, monitor blood sugar and he will report on Saturday at the appointment time

## 2023-11-16 ENCOUNTER — TELEPHONE (OUTPATIENT)
Dept: INTERNAL MEDICINE CLINIC | Facility: CLINIC | Age: 68
End: 2023-11-16

## 2023-11-16 NOTE — TELEPHONE ENCOUNTER
Left Voicemail for Radha Adams RN to call back our office. Office phone number provided with office telephone hours.

## 2023-11-16 NOTE — TELEPHONE ENCOUNTER
Daphney Yan nurse from [facility] called to inform that patients was Recently in the hospital she wanted to speak to a nurse to talk to them if member is adhering to treatment plan or if needing any assistance from them. Wants to make sure patient is following plan of care.

## 2023-11-27 NOTE — TELEPHONE ENCOUNTER
No call back from [facility]) after two call attempts by nurses. Called [phone] and left detailed message on voice mailbox if she needs to speak with nurse r/t pt call [phone], ask to speak with nurse. Office hours Monday thru Friday 8 am to 4:30 pm and Sat 8am to 12 noon.      Please reply to alec: RAMIREZ Pierre Resides

## 2023-12-01 ENCOUNTER — TELEPHONE (OUTPATIENT)
Dept: INTERNAL MEDICINE CLINIC | Facility: CLINIC | Age: 68
End: 2023-12-01

## 2023-12-01 DIAGNOSIS — N39.0 RECURRENT UTI: Primary | ICD-10-CM

## 2023-12-01 NOTE — TELEPHONE ENCOUNTER
Patient's wife Julianna Hidalgo calling. States was seen by liver specialist at [facility], who recommended patient be referred to Infectious Disease specialist as patient has been treated for recurrent urinary tract infections. He never saw Urologist Dr Arvind Yee (referral from Dr Birtha Claude) because [facility] read the patient reviews on line and did not see a lot of good reviews. \"  Julianna Hidalgo is asking for:   Repeat urine test--pended for Dr Birtha Claude to review. Sooner office appointment with Dr Pao Frederick for 12/05/2023. Referral to ID. Pended for Dr Birtha Claude.         Future Appointments   Date Time Provider Tico Anthonyi   12/5/2023 11:20 AM Zainab Hector MD [facility] [place]   12/27/2023  1:40 PM Zainab Hector MD [address]

## 2023-12-02 ENCOUNTER — TELEPHONE (OUTPATIENT)
Dept: INTERNAL MEDICINE CLINIC | Facility: CLINIC | Age: 68
End: 2023-12-02

## 2023-12-02 NOTE — TELEPHONE ENCOUNTER
Order for UA with reflex placed, wife notified, also advised her to that patient should see urologist not just infectious disease doctor recommended to see Dr. Huseyin Kitchen at [place]

## 2023-12-02 NOTE — TELEPHONE ENCOUNTER
Please call wife provide with phone number to [facility] infectious disease consultants [phone] office in [place] I refer a lot of patient to this group of the doctors Myra/ Leta/Clive/ DOROTA or any other associates. I found 1 doctor through [facility] [phone]. They can ask liver specialist who they would recommend from [facility].   Patient can consider seeing allergist Dr. Lindsay Gil at  [facility]

## 2023-12-02 NOTE — TELEPHONE ENCOUNTER
Patient's wife notified, verbalized understanding. She would like to know if urine testing can be completed to confirm infection is gone?

## 2023-12-04 ENCOUNTER — TELEPHONE (OUTPATIENT)
Dept: INTERNAL MEDICINE CLINIC | Facility: CLINIC | Age: 68
End: 2023-12-04

## 2023-12-04 RX ORDER — DEXTROAMPHETAMINE SACCHARATE, AMPHETAMINE ASPARTATE MONOHYDRATE, DEXTROAMPHETAMINE SULFATE AND AMPHETAMINE SULFATE 6.25; 6.25; 6.25; 6.25 MG/1; MG/1; MG/1; MG/1
25 CAPSULE, EXTENDED RELEASE ORAL EVERY MORNING
Qty: 30 CAPSULE | Refills: 0 | Status: CANCELLED | OUTPATIENT
Start: 2023-12-04

## 2023-12-04 NOTE — TELEPHONE ENCOUNTER
Verified name and . Wife of patient states that patient was recently discharged from hospital and has some medication adjustments needing refills. Patient with upcoming appointment tomorrow:    Future Appointments   Date Time Provider Tico Branisti   2023 11:20 AM Sarai Vasquez MD [facility] [place]   2023  1:40 PM Sarai Vasquez MD [facility] [place]     She states she will have patient discuss the medications changes during office visit.

## 2023-12-05 ENCOUNTER — TELEPHONE (OUTPATIENT)
Dept: INTERNAL MEDICINE CLINIC | Facility: CLINIC | Age: 68
End: 2023-12-05

## 2023-12-05 RX ORDER — DEXTROAMPHETAMINE SACCHARATE, AMPHETAMINE ASPARTATE MONOHYDRATE, DEXTROAMPHETAMINE SULFATE AND AMPHETAMINE SULFATE 6.25; 6.25; 6.25; 6.25 MG/1; MG/1; MG/1; MG/1
25 CAPSULE, EXTENDED RELEASE ORAL DAILY
Qty: 30 CAPSULE | Refills: 0 | Status: SHIPPED | OUTPATIENT
Start: 2024-02-18 | End: 2024-03-19

## 2023-12-05 RX ORDER — DEXTROAMPHETAMINE SACCHARATE, AMPHETAMINE ASPARTATE MONOHYDRATE, DEXTROAMPHETAMINE SULFATE AND AMPHETAMINE SULFATE 6.25; 6.25; 6.25; 6.25 MG/1; MG/1; MG/1; MG/1
25 CAPSULE, EXTENDED RELEASE ORAL DAILY
Qty: 30 CAPSULE | Refills: 0 | Status: SHIPPED | OUTPATIENT
Start: 2023-12-20 | End: 2024-01-19

## 2023-12-05 RX ORDER — DEXTROAMPHETAMINE SACCHARATE, AMPHETAMINE ASPARTATE MONOHYDRATE, DEXTROAMPHETAMINE SULFATE AND AMPHETAMINE SULFATE 6.25; 6.25; 6.25; 6.25 MG/1; MG/1; MG/1; MG/1
25 CAPSULE, EXTENDED RELEASE ORAL DAILY
Qty: 30 CAPSULE | Refills: 0 | Status: SHIPPED | OUTPATIENT
Start: 2024-01-19 | End: 2024-02-19

## 2023-12-09 ENCOUNTER — LAB ENCOUNTER (OUTPATIENT)
Dept: LAB | Age: 68
End: 2023-12-09
Attending: INTERNAL MEDICINE
Payer: COMMERCIAL

## 2023-12-09 DIAGNOSIS — R39.9 UTI SYMPTOMS: ICD-10-CM

## 2023-12-09 PROBLEM — C43.59 MELANOMA OF BACK (HCC): Status: RESOLVED | Noted: 2018-08-19 | Resolved: 2023-12-09

## 2023-12-09 LAB
BILIRUB UR QL: NEGATIVE
COLOR UR: YELLOW
GLUCOSE UR-MCNC: NORMAL MG/DL
KETONES UR-MCNC: NEGATIVE MG/DL
LEUKOCYTE ESTERASE UR QL STRIP.AUTO: 500
NITRITE UR QL STRIP.AUTO: NEGATIVE
PH UR: 6 [PH] (ref 5–8)
PROT UR-MCNC: 70 MG/DL
RBC #/AREA URNS AUTO: >10 /HPF
SP GR UR STRIP: 1.01 (ref 1–1.03)
UROBILINOGEN UR STRIP-ACNC: NORMAL
WBC #/AREA URNS AUTO: >50 /HPF
WBC CLUMPS UR QL AUTO: PRESENT /HPF

## 2023-12-09 PROCEDURE — 87086 URINE CULTURE/COLONY COUNT: CPT

## 2023-12-09 PROCEDURE — 87186 SC STD MICRODIL/AGAR DIL: CPT

## 2023-12-09 PROCEDURE — 81001 URINALYSIS AUTO W/SCOPE: CPT

## 2023-12-09 PROCEDURE — 87077 CULTURE AEROBIC IDENTIFY: CPT

## 2023-12-27 ENCOUNTER — OFFICE VISIT (OUTPATIENT)
Dept: SURGERY | Facility: CLINIC | Age: 68
End: 2023-12-27
Payer: COMMERCIAL

## 2023-12-27 ENCOUNTER — OFFICE VISIT (OUTPATIENT)
Dept: INTERNAL MEDICINE CLINIC | Facility: CLINIC | Age: 68
End: 2023-12-27
Payer: COMMERCIAL

## 2023-12-27 VITALS
RESPIRATION RATE: 18 BRPM | BODY MASS INDEX: 44.3 KG/M2 | WEIGHT: 265.88 LBS | DIASTOLIC BLOOD PRESSURE: 70 MMHG | SYSTOLIC BLOOD PRESSURE: 109 MMHG | HEART RATE: 91 BPM | HEIGHT: 65 IN

## 2023-12-27 DIAGNOSIS — Z00.00 PHYSICAL EXAM, ANNUAL: Primary | ICD-10-CM

## 2023-12-27 DIAGNOSIS — F90.0 ATTENTION DEFICIT HYPERACTIVITY DISORDER (ADHD), PREDOMINANTLY INATTENTIVE TYPE: ICD-10-CM

## 2023-12-27 DIAGNOSIS — R33.9 URINARY RETENTION: Primary | ICD-10-CM

## 2023-12-27 DIAGNOSIS — E11.9 TYPE 2 DIABETES MELLITUS WITHOUT COMPLICATION, WITHOUT LONG-TERM CURRENT USE OF INSULIN (HCC): ICD-10-CM

## 2023-12-27 LAB
BILIRUB UR QL: NEGATIVE
COLOR UR: YELLOW
GLUCOSE UR-MCNC: NORMAL MG/DL
KETONES UR-MCNC: NEGATIVE MG/DL
LEUKOCYTE ESTERASE UR QL STRIP.AUTO: 500
NITRITE UR QL STRIP.AUTO: NEGATIVE
PH UR: 5.5 [PH] (ref 5–8)
PROT UR-MCNC: 20 MG/DL
RBC #/AREA URNS AUTO: >10 /HPF
SP GR UR STRIP: 1.01 (ref 1–1.03)
UROBILINOGEN UR STRIP-ACNC: NORMAL
WBC #/AREA URNS AUTO: >50 /HPF
WBC CLUMPS UR QL AUTO: PRESENT /HPF

## 2023-12-27 PROCEDURE — 99214 OFFICE O/P EST MOD 30 MIN: CPT | Performed by: UROLOGY

## 2023-12-27 PROCEDURE — 51702 INSERT TEMP BLADDER CATH: CPT | Performed by: UROLOGY

## 2023-12-27 RX ORDER — TAMSULOSIN HYDROCHLORIDE 0.4 MG/1
0.4 CAPSULE ORAL DAILY
Qty: 90 CAPSULE | Refills: 3 | Status: SHIPPED | OUTPATIENT
Start: 2023-12-27 | End: 2024-12-21

## 2023-12-27 RX ORDER — REPAGLINIDE 1 MG/1
1 TABLET ORAL
COMMUNITY

## 2023-12-27 NOTE — PROGRESS NOTES
Rosario Mace MD  Department of Urology  [facility]  Rishi Gallego    T: [phone]  F: [phone]    To: Angelica Dodge MD   [address] [place] [id number]    Re: Clem Kay   MRN: [id number]  : 3/27/1955    Dear Angelica Dodge MD,    Today I had the pleasure of seeing Clem Kay in my clinic. As you know, Mr. Pato Carnes is a pleasant 76year old year old male who I am seeing for uti. Patient was last seen in this department on Visit date not found. Briefly, this is a patient of Dr. Lizzie Valdes. He last saw patient on 2022 for ED. Recently patient has been noted to have recurrent enterococcal UTIs. Last cross sectional imaging was  which demonstrated a 2mm nonobstructing stone.        PAST MEDICAL HISTORY:  Past Medical History:   Diagnosis Date    Allergic rhinitis     Ragweed Pollen    Anxiety     Learning / catching up on age related medical issues. 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 ([facility] 2020    HE, Non bleeding varices    Obesity     Osteoarthritis     Prediabetes     Psoriasis     Sarcoidosis of lung ([facility]     Sleep apnea     Visual impairment     EYEGLASSES        PAST SURGICAL HISTORY:  Past Surgical History:   Procedure Laterality Date    CATARACT Bilateral     IOL's    COLONOSCOPY N/A 10/18/2018    Procedure: COLONOSCOPY;  Surgeon: Kirk Cali MD;  Location: [facility] ENDOSCOPY    COLONOSCOPY N/A 2019    Kirk Cali MD;  Polyps (TA)   Repeat     COLONOSCOPY  21= Polyps (TA)    Repeat     COLONOSCOPY N/A 2021    Procedure: COLONOSCOPY with HOT SNARE POLYPECTOMY, ESOPHAGOGASTRODUODENOSCOPY (EGD);   Surgeon: Thien Rome MD;  [place] [facility] [place]    HIP REPLACEMENT SURGERY  Sept 2015    Right Hip    SKIN SURGERY  09/07/2018    excision back melanoma     TONSILLECTOMY  1961    TOTAL HIP REPLACEMENT Right 09/2015    UPPER GI ENDOSCOPY PERFORMED  4/21/21= Grade 2 varices    UPPER GI ENDOSCOPY,VITALY VARIX  07/25/2023    Variceal bleed, banded in [address]:  Allergies   Allergen Reactions    Amoxicillin DIARRHEA         MEDICATIONS:  Current Outpatient Medications   Medication Instructions    aMILoride (MIDAMOR) 10 mg, Oral, 2 times daily, The patient is to take 20mg per day    Amphetamine-Dextroamphet ER (ADDERALL XR) 25 MG Oral Capsule SR 24 Hr 25 mg, Oral, Every morning    Amphetamine-Dextroamphet ER (ADDERALL XR) 25 MG Oral Capsule SR 24 Hr 25 mg, Oral, Daily    [START ON 1/19/2024] Amphetamine-Dextroamphet ER (ADDERALL XR) 25 MG Oral Capsule SR 24 Hr 25 mg, Oral, Daily    [START ON 2/18/2024] Amphetamine-Dextroamphet ER (ADDERALL XR) 25 MG Oral Capsule SR 24 Hr 25 mg, Oral, Daily    bumetanide (BUMEX) 2 mg, Oral, Daily    Clobetasol Propionate 0.05 % External Liquid 125 mL, Topical, As Directed    Continuous Blood Gluc Sensor (FREESTYLE GOLD 3 SENSOR) Does not apply Misc 1 each, Does not apply, Daily    escitalopram (LEXAPRO) 10 mg, Oral, Daily    Ferrous Sulfate 324 (65 Fe) MG Oral Tab EC 1 tablet, Oral, Nightly    fluticasone propionate 50 MCG/ACT Nasal Suspension 2 sprays, Each Nare, Daily    hydrocortisone 2.5 % External Cream 1 yonis - Face    lactulose (GENERLAC) 10 GM/15ML Oral Solution TAKE 30ML BY MOUTH TWICE DAILY    midodrine (PROAMATINE) 5 mg, Oral, 3 times daily    Multiple Vitamin (MULTIVITAMIN ADULT OR) 1 tablet, Oral, Daily    nitrofurantoin monohydrate macro (MACROBID) 100 mg, Oral, 2 times daily    omega-3 fatty acids (FISH OIL) 1,000 mg, Oral, Nightly    Potassium Chloride ER 20 MEQ Oral Tab CR Take 1 tablet p.o. twice a day for 2 days, continue 1 tablet daily afterwards    Probiotic Product (PROBIOTIC-10 OR) 1 capsule, Oral, Daily    Psyllium 0.52 g Oral Cap No dose, route, or frequency recorded. rifAXIMin (XIFAXAN) 550 mg, Oral, 2 times daily        FAMILY HISTORY:  Family History   Problem Relation Age of Onset    Cancer Father         Melanoma    Other (Other) Mother         strokes    Cancer Mother         Colon Cancer    Dementia Mother         NOT Alzheimers, but cognitive impairment    Hypertension Mother         Hydrochlorothiazide    Other (Other) Daughter         ASTHMA    Other (Other) Son         ASTHMA    Anemia Brother         Uses Ferrous Sulphate 325mg    Asthma Brother     Cancer Brother         Colon Cancer & Squamus Cell skin cancer    Asthma Son     Obesity Son     Psychiatric Son         ADHD    Asthma Son     Asthma Daughter     Obesity Daughter     Cancer Sister         Melanoma        SOCIAL HISTORY:  Social History     Socioeconomic History    Marital status:    Tobacco Use    Smoking status: Former     Packs/day: 2.00     Years: 25.00     Additional pack years: 0.00     Total pack years: 50.00     Types: Cigarettes     Quit date: 2014     Years since quittin.7    Smokeless tobacco: Never    Tobacco comments:     [age], light smoker, quit [age], medium smoker 43-58, 59+ quit smoking   Vaping Use    Vaping Use: Never used   Substance and Sexual Activity    Alcohol use: Not Currently     Comment: very light social drinker, 1-2 wine or 1-2 beer, none 2016+    Drug use: No          PHYSICAL EXAMINATION:  There were no vitals filed for this visit.   CONSTITUTIONAL: No apparent distress, cooperative and communicative  NEUROLOGIC: Alert and oriented   HEAD: Normocephalic, atraumatic   EYES: Sclera non-icteric   ENT: Hearing intact, moist mucous membranes   NECK: No obvious goiter or masses   RESPIRATORY: Normal respiratory effort, Nonlabored breathing on room air  SKIN: No evident rashes   ABDOMEN: Soft, nontender, nondistended, no rebound tenderness, no guarding, no masses      REVIEW OF SYSTEMS:    A comprehensive 10-point review of systems was completed. Pertinent positives and negatives are noted in the the HPI. LABORATORY DATA:  URINE CULTURE >100,000 CFU/ML Enterococcus faecium VRE Abnormal    Amoxicillin or Nitrofurantoin remain the drugs of choice for uncomplicated urinary tract infections. URINE CULTURE <10,000 CFU/ML Alpha hemolytic Streptococcus Abnormal            URINE CULTURE 50,000-99,000 CFU/ML Enterococcus faecium VRE Abnormal    Amoxicillin or Nitrofurantoin remain the drugs of choice for uncomplicated urinary tract infections. IMAGING REVIEW:  Narrative   PROCEDURE: US VENOUS DOPPLER LEG BILAT-DIAG IMG (CPT=93970)     COMPARISON: None. INDICATIONS: Bilateral leg pain. TECHNIQUE: Color duplex Doppler venous ultrasound of both lower extremities was performed in the  usual manner. FINDINGS:   The femoral and popliteal veins appear normal.  Normal flow was demonstrated with color and pulsed Doppler. Visualized portions of the great and small saphenous, posterior tibial and peroneal veins appear normal.    THROMBI: None visible. COMPRESSIBILITY:   Normal.  OTHER: Mild soft tissue edema within the bilateral calves. Impression   CONCLUSION:     No DVT in either lower extremity. Dictated by (CST): Latesha Kc MD on 10/06/2023 at 7:15 PM      Finalized by (CST): Latesha Kc MD on 10/06/2023 at 7:16 PM           Result History       OTHER RELEVANT DATA:   none     IMPRESSION: Recurrent UTIs and LUTS likely secondary to incomplete bladder emptying. Will place Calvillo catheter and initiate tamsulosin 0.4 mg nightly. He will return to clinic in 7 to 10 days for voiding trial.  If he passes his voiding trial he should continue the tamsulosin 0.4 mg nightly and return to clinic to follow-up in 1 month.     If he fails his voiding trial, catheter should be replaced and she should return to clinic again for a voiding trial in 7 to 10 days. If he passes his voiding trial at that time he can continue tamsulosin 0.4 mg nightly and return to clinic to follow-up in 1 month. If he fails his second voiding trial, catheter should be replaced and he should be set up for cystoscopy, urodynamics and transrectal ultrasound if he is interested to see if he is a candidate for an outlet procedure. PLAN:  Tamsulosin 0.4 mg nightly  Catheter to be placed by nursing  Return to clinic in 7 to 10 days for voiding trial-see instructions above  Increase hydration, avoidance of constipation in general    Thank you for referring this very pleasant patient to my clinic. If you have any questions or concerns, please do not hesitate to contact me. Sincerely,  Tavo Cruz MD    30 minutes were spent on this patient at this visit obtaining a history, reviewing medical records, developing a treatment plan, counseling and discussing treatment strategy with patient, coordination of care and documentation. The [facility] makes medical notes available to patients in the interest of transparency. However, please be advised that this is a medical document. It is intended as a peer to peer communication. It is written in medical language and may contain abbreviations or verbiage that are technical and unfamiliar. It may appear blunt or direct. Medical documents are intended to carry relevant information, facts as evident, and the clinical opinion of the practitioner.

## 2023-12-27 NOTE — PROGRESS NOTES
I was asked by AR to place an 25 FR coude tip cole cath with 10 ml balloon to leg drainage bag for this pt for his urinary retention of with PVR bladder scan showing 788 ml retained. I gathered my supplies and went into the exam room and introduced myself to the pt and his spouse and I explained AR's orders and pt agreed to proceed. I assisted pt onto the exam table and also to lower his bottom clothing to his ankles and I then placed him in supine position on the exam table. I then proceeded to prep and drape the pt in sterile fashion and I proceeded to instill about 2/3 the amt of a Urojet (Lidocaine HCL 2 % gel- 20 ml) into the pt's urethra. I then constricted the head of the penis for about 1-2 min to prevent the gel from leaking out and to also allow sufficient numbing time before the cath placement. After the time elapsed I then proceeded to  insert a new 18 FR coude tip cole catheter without difficulty but I noted there were 2 narrowed areas just past the head of the penis and also at the prostatic urethra. I received a urine return of 1000 ml of cloudy pus laden urine. Pt tolerated this well. I then attached extension tubing to the cole cath and a leg bag to the tubing. I also placed the cole cath in a STAT lock and I secured it to pt's Rt. Upper inner thigh. I then secured the drainage tubing and leg bag to the pt's Rt lower leg with the velcro straps. I then reviewed the use of aseptic technique when performing catheter care and when changing over to the night drainage bag. I explained how to change to the night bag and stressed that they use alcohol swabs to clean the ends before connecting the bags. I then assisted the pt off the exam table and to redress. I instructed pt to make a n/v appt in 7-10 days for a voiding trial decath and to start the Tamsulosin. Pt and spouse verbalized understanding and compliance.

## 2023-12-28 ENCOUNTER — TELEPHONE (OUTPATIENT)
Dept: SURGERY | Facility: CLINIC | Age: 68
End: 2023-12-28

## 2023-12-28 NOTE — TELEPHONE ENCOUNTER
Pt has release to leave message on phone. I called and left a message with Dr. Franklin aBrnes note. Also left our phone number to call with any questions.

## 2023-12-28 NOTE — TELEPHONE ENCOUNTER
----- Message from Lynn Andres MD sent at 12/28/2023 12:00 PM CST -----  Culture positive  Sending macrobid x 7 days

## 2024-01-02 ENCOUNTER — TELEPHONE (OUTPATIENT)
Dept: SURGERY | Facility: CLINIC | Age: 69
End: 2024-01-02

## 2024-01-02 NOTE — TELEPHONE ENCOUNTER
Called patient's wife Minnie, verified husbands name and . Wife was asking if it is normal to feel like the catheter is \"pulling\", I let her know that the catheter might be moving when pt changes positions and it might be causing some discomfort. I let know as long as the urine in the bag is yellow and as long there is urine in the bag that means the catheter is in the right place. Wife and pt also asked on how to change the leg bag to a night bag, I explained to them to just disconnect the leg bag and to reconnect the night bag. Wife also asked if they can come in for a nurse visit sooner, their appointment is on Friday. I let them know that the reason why we prefer to wait is for the medication to take affect - tamsulosin. Pt and wife verbalized understandings and have no further questions.  Encounter complete.

## 2024-01-03 RX ORDER — ESCITALOPRAM OXALATE 10 MG/1
10 TABLET ORAL DAILY
Qty: 90 TABLET | Refills: 3 | Status: SHIPPED | OUTPATIENT
Start: 2024-01-03

## 2024-01-03 NOTE — TELEPHONE ENCOUNTER
Refill passed per [facility] protocol.  Requested Prescriptions   Pending Prescriptions Disp Refills    ESCITALOPRAM 10 MG Oral Tab [Pharmacy Med Name: ESCITALOPRAM 10MG TABLETS] 90 tablet 1     Sig: TAKE 1 TABLET(10 MG) BY MOUTH DAILY       Psychiatric Non-Scheduled (Anti-Anxiety) Passed - 1/2/2024  6:02 PM        Passed - In person appointment or virtual visit in the past 6 mos or appointment in next 3 mos     Recent Outpatient Visits              1 week ago [facility]    Office Visit    1 week ago Urinary retention    [facility] Taniya Ricketts MD    Office Visit    4 weeks ago Attention deficit hyperactivity disorder (ADHD), predominantly inattentive type    [facility]    [facility]    2 months ago [facility]    [facility] Visit    2 months ago Bilateral leg edema    [facility] [place] Kandel, Ninel, MD    Office Visit          Future Appointments         Provider Department Appt Notes    In 2 days [facility] UROLOGY NURSE [facility] voiding trail decath  per NIna7 day   RN                  Recent Outpatient Visits              1 week ago Physical exam, annual    [facility] [place] Kandel, Ninel, MD    Office Visit    1 week ago Urinary retention    [facility] Taniya Ricketts MD    Office Visit    4 weeks ago Attention deficit hyperactivity disorder (ADHD), predominantly inattentive type    [facility]    Office Visit    2 months ago Bilateral leg edema    [facility]     Office Visit    2 months ago Bilateral leg edema    [facility] [place]    Office Visit          Future Appointments         Provider Department Appt Notes    In 2 days [facility] UROLOGY NURSE [facility] [place] voiding trail decath  per NIna7 day   RN

## 2024-01-04 RX ORDER — POTASSIUM CHLORIDE 1500 MG/1
TABLET, EXTENDED RELEASE ORAL
Qty: 90 TABLET | Refills: 0 | Status: SHIPPED | OUTPATIENT
Start: 2024-01-04

## 2024-01-04 NOTE — TELEPHONE ENCOUNTER
Please review; protocol failed/ has no protocol    Requested Prescriptions   Pending Prescriptions Disp Refills    POTASSIUM CHLORIDE ER 20 MEQ Oral Tab CR [Pharmacy Med Name: POTASSIUM CHLORIDE 20MEQ ER TABLETS] 90 tablet 0     Sig: TAKE 1 TABLET BY MOUTH TWICE DAILY FOR 2 DAYS. CONTINUE 1 TABLET DAILY THEREAFTER       There is no refill protocol information for this order        Recent Outpatient Visits              1 week ago Physical exam, annual    [facility] [place] Kandel, Ninel, MD    Office Visit    1 week ago [facility]    Office Visit    1 month ago Attention deficit hyperactivity disorder (ADHD), predominantly inattentive type    [facility]    Office Visit    2 months ago [facility]    Office Visit    3 months ago Bilateral leg edema    [facility] [place] Kandel, Ninel, MD    Office Visit          Future Appointments         Provider Department Appt Notes    Tomorrow [facility]ath  per NIna7 day   RN

## 2024-01-05 ENCOUNTER — NURSE ONLY (OUTPATIENT)
Dept: SURGERY | Facility: CLINIC | Age: 69
End: 2024-01-05

## 2024-01-05 DIAGNOSIS — R33.9 URINARY RETENTION: Primary | ICD-10-CM

## 2024-01-05 PROCEDURE — 51700 IRRIGATION OF BLADDER: CPT | Performed by: UROLOGY

## 2024-01-05 NOTE — PROGRESS NOTES
I called the pt and his wife into the exam room and introduced myself and verified the spelling of his last name and his . I explained the voiding trial to the patient and his wife and they decided to proceed.     I asked the patient to pull down his pants and underpants and have a seat on the table. I elevated the patient's legs and clamped the catheter. I then disconnected the catheter tubing and bag. The patient's urine was yellow and clear, no odor. I discarded the bag and tubing and cleaned the catheter opening with alcohol pads. I removed 8 mL of water from the catheter and connected a neisha syringe to the catheter. I slowly filled the patient's bladder with approx. 400 mL of NS. When the patient stated that he had to urinate I gently removed the catheter without resistance.     I gave the patient a urinal and ran the water and left the room for several minutes.  When I came back the patient had urinated 300 mL of clear yellow urine. Per Dr. Coombs I made a follow up appointment with the patient in 4 weeks. I advised the patient to try and empty his bladder every 2 hours for now and to call us if he feels he is not emptying his bladder completely so we can do a bladder scan. I also told the patient that he needs to go to the ER if it is after hours and he is unable to urinate.    Both the patient and his wife verbalized their understanding.

## 2024-02-05 ENCOUNTER — OFFICE VISIT (OUTPATIENT)
Dept: SURGERY | Facility: CLINIC | Age: 69
End: 2024-02-05
Payer: COMMERCIAL

## 2024-02-05 DIAGNOSIS — N40.1 BPH W URINARY OBS/LUTS: ICD-10-CM

## 2024-02-05 DIAGNOSIS — N13.8 BPH W URINARY OBS/LUTS: ICD-10-CM

## 2024-02-05 DIAGNOSIS — R33.9 URINARY RETENTION: Primary | ICD-10-CM

## 2024-02-05 NOTE — PROGRESS NOTES
Subjective:     Patient ID: Joe Haney is a 68 year old male.    HPI  Mr. Haney is a pleasant 68 year old male who presents today for follow up. He has a history of urinary retention. The patient was seen on 12/27/2023 by Dr. Coombs and found to have 788 mL of urine retained in the bladder. The patient had a cole cath placed. He returned on 01/05/2024 for a voiding trial and was successful in passing the voiding trial. The patient returns today after being on Tamsulosin 0.4mg PO nightly since 12/27/2023. He returns for a one month follow up to evaluate how he is doing on Tamsulosin. He reports that his streat is improving, he denies any feeling of hesistancy. He reports that he feels he is completely emptying his bladder. He denies any issues with fever, chills, nausea, vomiting, constipation, hematuria or dysuria.   History/Other:   A comprehensive 5 point review of systems was completed.  Pertinent positives and negatives noted in the the HPI.   Current Outpatient Medications   Medication Sig Dispense Refill    Amphetamine-Dextroamphet ER (ADDERALL XR) 25 MG Oral Capsule SR 24 Hr Take 1 capsule (25 mg total) by mouth daily. 30 capsule 0    Potassium Chloride ER 20 MEQ Oral Tab CR TAKE 1 TABLET BY MOUTH TWICE DAILY FOR 2 DAYS. CONTINUE 1 TABLET DAILY THEREAFTER 90 tablet 0    escitalopram 10 MG Oral Tab Take 1 tablet (10 mg total) by mouth daily. 90 tablet 3    tamsulosin 0.4 MG Oral Cap Take 1 capsule (0.4 mg total) by mouth daily. Take 1/2 hour following the same meal each day 90 capsule 3    Repaglinide 1 MG Oral Tab Take 1 tablet (1 mg total) by mouth 3 (three) times daily before meals.      nitrofurantoin monohydrate macro 100 MG Oral Cap Take 1 capsule (100 mg total) by mouth 2 (two) times daily. 14 capsule 0    bumetanide 2 MG Oral Tab Take 1 tablet (2 mg total) by mouth 2 (two) times daily.      [START ON 2/18/2024] Amphetamine-Dextroamphet ER (ADDERALL XR) 25 MG Oral Capsule SR 24 Hr Take 1  capsule (25 mg total) by mouth daily. 30 capsule 0    Amphetamine-Dextroamphet ER (ADDERALL XR) 25 MG Oral Capsule SR 24 Hr Take 1 capsule (25 mg total) by mouth every morning. 30 capsule 0    Ferrous Sulfate 324 (65 Fe) MG Oral Tab EC Take 1 tablet by mouth at bedtime.      omega-3 fatty acids 1000 MG Oral Cap Take 1,000 mg by mouth at bedtime.      midodrine 5 MG Oral Tab Take 1 tablet (5 mg total) by mouth in the morning and 1 tablet (5 mg total) at noon and 1 tablet (5 mg total) in the evening.      Multiple Vitamin (MULTIVITAMIN ADULT OR) Take 1 tablet by mouth daily.      Probiotic Product (PROBIOTIC-10 OR) Take 1 capsule by mouth daily.      fluticasone propionate 50 MCG/ACT Nasal Suspension 2 sprays by Each Nare route daily. 1 each 1    Continuous Blood Gluc Sensor (FREESTYLE GOLD 3 SENSOR) Does not apply Misc 1 each daily. 1 each 11    lactulose (GENERLAC) 10 GM/15ML Oral Solution TAKE 30ML BY MOUTH TWICE DAILY (Patient taking differently: TAKE 30ML BY MOUTH THREE DAILY) 946 mL 11    rifaximin (XIFAXAN) 550 MG Oral Tab Take 1 tablet (550 mg total) by mouth 2 (two) times daily. 60 tablet 11    aMILoride 5 MG Oral Tab Take 2 tablets (10 mg total) by mouth 2 (two) times a day. The patient is to take 20mg per day (Patient taking differently: Take 1 tablet (5 mg total) by mouth in the morning and 1 tablet (5 mg total) before bedtime.) 120 tablet 3    Psyllium 0.52 g Oral Cap       hydrocortisone 2.5 % External Cream 1 yonis - Face      Clobetasol Propionate 0.05 % External Liquid Apply 125 mL topically As Directed.       Allergies:  Allergies   Allergen Reactions    Amoxicillin DIARRHEA       Past Medical History:   Diagnosis Date    Allergic rhinitis 1970    Ragweed Pollen    Anxiety 2021    Learning / catching up on age related medical issues.    Attention deficit hyperactivity disorder (ADHD)     BPH (benign prostatic hyperplasia)     Depression 2021    Mild, related to age issues    Diabetes (HCC)      Esophageal reflux 2020    Essential hypertension     High blood pressure     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 (HCC) 12/29/2020    HE, Non bleeding varices    Obesity     Osteoarthritis     Prediabetes     Psoriasis     Sarcoidosis of lung (HCC)     Sleep apnea     Visual impairment     EYEGLASSES      Past Surgical History:   Procedure Laterality Date    CATARACT Bilateral     IOL's    COLONOSCOPY N/A 10/18/2018    Procedure: COLONOSCOPY;  Surgeon: Jude Escamilla MD;  Location: [facility] ENDOSCOPY    COLONOSCOPY N/A 02/18/2019    Jude Escamilla MD;  Polyps (TA)   Repeat 2024    COLONOSCOPY  4/21/21= Polyps (TA)    Repeat 2024    COLONOSCOPY N/A 04/21/2021    Procedure: COLONOSCOPY with HOT SNARE POLYPECTOMY, ESOPHAGOGASTRODUODENOSCOPY (EGD);  Surgeon: Flip Corey MD;  Location:  ENDOSCOPY    HIP REPLACEMENT SURGERY  Sept 2015    Right Hip    SKIN SURGERY  09/07/2018    excision back melanoma     TONSILLECTOMY  1961    TOTAL HIP REPLACEMENT Right 09/2015    UPPER GI ENDOSCOPY PERFORMED  4/21/21= Grade 2 varices    UPPER GI ENDOSCOPY,LIGAT VARIX  07/25/2023    Variceal bleed, banded in [place]    VASECTOMY  1993      Family History   Problem Relation Age of Onset    Cancer Father         Melanoma    Other (Other) Mother         strokes    Cancer Mother         Colon Cancer    Dementia Mother         NOT Alzheimers, but cognitive impairment    Hypertension Mother         Hydrochlorothiazide    Other (Other) Daughter         ASTHMA    Other (Other) Son         ASTHMA    Anemia Brother         Uses Ferrous Sulphate 325mg    Asthma Brother     Cancer Brother         Colon Cancer & Squamus Cell skin cancer    Asthma Son     Obesity Son     Psychiatric Son         ADHD    Asthma Son     Asthma Daughter     Obesity Daughter     Cancer Sister         Melanoma      Social History:   Social History     Socioeconomic History    Marital status:    Tobacco Use    Smoking status: Former     Packs/day: 2.00     Years:  25.00     Additional pack years: 0.00     Total pack years: 50.00     Types: Cigarettes     Quit date: 2014     Years since quittin.8    Smokeless tobacco: Never    Tobacco comments:     [age], light smoker, quit 26-43, medium smoker 43-58, 59+ quit smoking   Vaping Use    Vaping Use: Never used   Substance and Sexual Activity    Alcohol use: Not Currently     Comment: very light social drinker, 1-2 wine or 1-2 beer, none 2016+    Drug use: No        Objective:   Physical Exam  Constitutional:       Appearance: Normal appearance.   HENT:      Head: Normocephalic and atraumatic.   Eyes:      General: No scleral icterus.     Conjunctiva/sclera: Conjunctivae normal.   Pulmonary:      Effort: Pulmonary effort is normal. No respiratory distress.   Abdominal:      General: There is no distension.      Palpations: Abdomen is soft.      Tenderness: There is no abdominal tenderness. There is no right CVA tenderness or left CVA tenderness.   Genitourinary:     Comments: PVR: 54mL   Musculoskeletal:         General: No swelling. Normal range of motion.   Skin:     General: Skin is warm and dry.   Neurological:      Mental Status: He is alert and oriented to person, place, and time.   Psychiatric:         Mood and Affect: Mood normal.         Behavior: Behavior normal.         Assessment & Plan:   1. Urinary retention [R33.9]    2. BPH w urinary obs/LUTS    Patient is doing well on Tamsulosin. PVR is 54mL.   Discussed with patient to return to the clinic in 3-4 months to follow up and ensure that he is continuing to do well on medical therapy.     No orders of the defined types were placed in this encounter.      Meds This Visit:  Requested Prescriptions      No prescriptions requested or ordered in this encounter       Imaging & Referrals:  None     Shania Jacinto PA-C  2024

## 2024-02-17 ENCOUNTER — LAB ENCOUNTER (OUTPATIENT)
Dept: LAB | Age: 69
End: 2024-02-17
Attending: NURSE PRACTITIONER
Payer: COMMERCIAL

## 2024-02-17 ENCOUNTER — HOSPITAL ENCOUNTER (OUTPATIENT)
Dept: GENERAL RADIOLOGY | Age: 69
Discharge: HOME | End: 2024-02-17
Attending: NURSE PRACTITIONER
Payer: COMMERCIAL

## 2024-02-17 ENCOUNTER — OFFICE VISIT (OUTPATIENT)
Dept: INTERNAL MEDICINE CLINIC | Facility: CLINIC | Age: 69
End: 2024-02-17
Payer: COMMERCIAL

## 2024-02-17 VITALS
DIASTOLIC BLOOD PRESSURE: 68 MMHG | SYSTOLIC BLOOD PRESSURE: 122 MMHG | OXYGEN SATURATION: 99 % | BODY MASS INDEX: 44.98 KG/M2 | WEIGHT: 270 LBS | HEART RATE: 92 BPM | HEIGHT: 65 IN

## 2024-02-17 DIAGNOSIS — E11.21 CONTROLLED TYPE 2 DIABETES MELLITUS WITH DIABETIC NEPHROPATHY, WITHOUT LONG-TERM CURRENT USE OF INSULIN (HCC): ICD-10-CM

## 2024-02-17 DIAGNOSIS — R05.2 SUBACUTE COUGH: ICD-10-CM

## 2024-02-17 DIAGNOSIS — R06.01 ORTHOPNEA: ICD-10-CM

## 2024-02-17 DIAGNOSIS — D50.0 IRON DEFICIENCY ANEMIA DUE TO CHRONIC BLOOD LOSS: ICD-10-CM

## 2024-02-17 DIAGNOSIS — K76.82 HEPATIC ENCEPHALOPATHY (HCC): ICD-10-CM

## 2024-02-17 DIAGNOSIS — R05.2 SUBACUTE COUGH: Primary | ICD-10-CM

## 2024-02-17 DIAGNOSIS — D69.6 THROMBOCYTOPENIA (HCC): ICD-10-CM

## 2024-02-17 DIAGNOSIS — R60.0 BILATERAL LEG EDEMA: ICD-10-CM

## 2024-02-17 DIAGNOSIS — E66.01 CLASS 3 SEVERE OBESITY DUE TO EXCESS CALORIES WITH SERIOUS COMORBIDITY AND BODY MASS INDEX (BMI) OF 40.0 TO 44.9 IN ADULT (HCC): ICD-10-CM

## 2024-02-17 DIAGNOSIS — K75.81 LIVER CIRRHOSIS SECONDARY TO NASH (HCC): ICD-10-CM

## 2024-02-17 DIAGNOSIS — D61.818 PANCYTOPENIA (HCC): ICD-10-CM

## 2024-02-17 DIAGNOSIS — K74.60 LIVER CIRRHOSIS SECONDARY TO NASH (HCC): ICD-10-CM

## 2024-02-17 LAB
ALBUMIN SERPL-MCNC: 2.7 G/DL (ref 3.2–4.8)
ALBUMIN/GLOB SERPL: 0.7 {RATIO} (ref 1–2)
ALP LIVER SERPL-CCNC: 115 U/L
ALT SERPL-CCNC: 31 U/L
ANION GAP SERPL CALC-SCNC: 1 MMOL/L (ref 0–18)
AST SERPL-CCNC: 40 U/L (ref ?–34)
BASOPHILS # BLD AUTO: 0.01 X10(3) UL (ref 0–0.2)
BASOPHILS NFR BLD AUTO: 0.3 %
BILIRUB SERPL-MCNC: 0.8 MG/DL (ref 0.2–1.1)
BNP SERPL-MCNC: 149 PG/ML
BUN BLD-MCNC: 13 MG/DL (ref 9–23)
BUN/CREAT SERPL: 13.7 (ref 10–20)
CALCIUM BLD-MCNC: 8.7 MG/DL (ref 8.7–10.4)
CHLORIDE SERPL-SCNC: 113 MMOL/L (ref 98–112)
CO2 SERPL-SCNC: 26 MMOL/L (ref 21–32)
CREAT BLD-MCNC: 0.95 MG/DL
DEPRECATED RDW RBC AUTO: 53 FL (ref 35.1–46.3)
EGFRCR SERPLBLD CKD-EPI 2021: 87 ML/MIN/1.73M2 (ref 60–?)
EOSINOPHIL # BLD AUTO: 0.26 X10(3) UL (ref 0–0.7)
EOSINOPHIL NFR BLD AUTO: 6.8 %
ERYTHROCYTE [DISTWIDTH] IN BLOOD BY AUTOMATED COUNT: 15.7 % (ref 11–15)
FASTING STATUS PATIENT QL REPORTED: NO
GLOBULIN PLAS-MCNC: 3.9 G/DL (ref 2.8–4.4)
GLUCOSE BLD-MCNC: 106 MG/DL (ref 70–99)
HCT VFR BLD AUTO: 25.4 %
HGB BLD-MCNC: 7.6 G/DL
IMM GRANULOCYTES # BLD AUTO: 0.01 X10(3) UL (ref 0–1)
IMM GRANULOCYTES NFR BLD: 0.3 %
LYMPHOCYTES # BLD AUTO: 0.36 X10(3) UL (ref 1–4)
LYMPHOCYTES NFR BLD AUTO: 9.4 %
MAGNESIUM SERPL-MCNC: 2.2 MG/DL (ref 1.6–2.6)
MCH RBC QN AUTO: 27.8 PG (ref 26–34)
MCHC RBC AUTO-ENTMCNC: 29.9 G/DL (ref 31–37)
MCV RBC AUTO: 93 FL
MONOCYTES # BLD AUTO: 0.46 X10(3) UL (ref 0.1–1)
MONOCYTES NFR BLD AUTO: 12.1 %
NEUTROPHILS # BLD AUTO: 2.71 X10 (3) UL (ref 1.5–7.7)
NEUTROPHILS # BLD AUTO: 2.71 X10(3) UL (ref 1.5–7.7)
NEUTROPHILS NFR BLD AUTO: 71.1 %
OSMOLALITY SERPL CALC.SUM OF ELEC: 291 MOSM/KG (ref 275–295)
PLATELET # BLD AUTO: 74 10(3)UL (ref 150–450)
POTASSIUM SERPL-SCNC: 4.2 MMOL/L (ref 3.5–5.1)
PROT SERPL-MCNC: 6.6 G/DL (ref 5.7–8.2)
RBC # BLD AUTO: 2.73 X10(6)UL
SODIUM SERPL-SCNC: 140 MMOL/L (ref 136–145)
WBC # BLD AUTO: 3.8 X10(3) UL (ref 4–11)

## 2024-02-17 PROCEDURE — 99214 OFFICE O/P EST MOD 30 MIN: CPT | Performed by: NURSE PRACTITIONER

## 2024-02-17 PROCEDURE — 83540 ASSAY OF IRON: CPT

## 2024-02-17 PROCEDURE — 82728 ASSAY OF FERRITIN: CPT

## 2024-02-17 PROCEDURE — 84466 ASSAY OF TRANSFERRIN: CPT

## 2024-02-17 PROCEDURE — 71046 X-RAY EXAM CHEST 2 VIEWS: CPT | Performed by: NURSE PRACTITIONER

## 2024-02-17 PROCEDURE — 83880 ASSAY OF NATRIURETIC PEPTIDE: CPT

## 2024-02-17 PROCEDURE — 85025 COMPLETE CBC W/AUTO DIFF WBC: CPT

## 2024-02-17 PROCEDURE — 36415 COLL VENOUS BLD VENIPUNCTURE: CPT

## 2024-02-17 PROCEDURE — 80053 COMPREHEN METABOLIC PANEL: CPT

## 2024-02-17 PROCEDURE — 83735 ASSAY OF MAGNESIUM: CPT

## 2024-02-17 RX ORDER — BENZONATATE 200 MG/1
200 CAPSULE ORAL 3 TIMES DAILY PRN
Qty: 30 CAPSULE | Refills: 0 | Status: SHIPPED | OUTPATIENT
Start: 2024-02-17

## 2024-02-17 RX ORDER — LACTULOSE 10 G/15ML
30 SOLUTION ORAL 2 TIMES DAILY
COMMUNITY
Start: 2024-01-20

## 2024-02-17 NOTE — PROGRESS NOTES
Joe Haney is a 68 year old male.  HPI:   Pt c/o cough, cough is worse when laying flat. Very rare occurrences of SOB with cough when laying flat but otherwise denies KANG.   He has hx of liver cirrhosis secondary to GARCIA. He follows with hepatology at [facility].   He reports swelling in legs, chronic, worsened.   Denies any current CP, SOB, palpitations. He does endorse some weight gain. He reports that he does not tolerate spironolactone, had breast tenderness. He is taking amiloride. Taking Bumetanide 2 mg BID but not regularly, does not tolerate frequent urination. Uses 1 mg instead as needed, not used recently. Taking Iron for NO, hx of esophageal varices in cirrhosis.   Current Outpatient Medications   Medication Sig Dispense Refill    lactulose 10 GM/15ML Oral Solution Take 30 mL (20 g total) by mouth 2 (two) times daily.      benzonatate 200 MG Oral Cap Take 1 capsule (200 mg total) by mouth 3 (three) times daily as needed for cough. 30 capsule 0    Amphetamine-Dextroamphet ER (ADDERALL XR) 25 MG Oral Capsule SR 24 Hr Take 1 capsule (25 mg total) by mouth daily. 30 capsule 0    Potassium Chloride ER 20 MEQ Oral Tab CR TAKE 1 TABLET BY MOUTH TWICE DAILY FOR 2 DAYS. CONTINUE 1 TABLET DAILY THEREAFTER 90 tablet 0    escitalopram 10 MG Oral Tab Take 1 tablet (10 mg total) by mouth daily. 90 tablet 3    tamsulosin 0.4 MG Oral Cap Take 1 capsule (0.4 mg total) by mouth daily. Take 1/2 hour following the same meal each day 90 capsule 3    Repaglinide 1 MG Oral Tab Take 1 tablet (1 mg total) by mouth 3 (three) times daily before meals.      bumetanide 2 MG Oral Tab Take 1 tablet (2 mg total) by mouth 2 (two) times daily.      Amphetamine-Dextroamphet ER (ADDERALL XR) 25 MG Oral Capsule SR 24 Hr Take 1 capsule (25 mg total) by mouth daily. 30 capsule 0    Amphetamine-Dextroamphet ER (ADDERALL XR) 25 MG Oral Capsule SR 24 Hr Take 1 capsule (25 mg total) by mouth every morning. 30 capsule 0    omega-3 fatty  acids 1000 MG Oral Cap Take 1,000 mg by mouth at bedtime.      Multiple Vitamin (MULTIVITAMIN ADULT OR) Take 1 tablet by mouth daily.      Probiotic Product (PROBIOTIC-10 OR) Take 1 capsule by mouth daily.      fluticasone propionate 50 MCG/ACT Nasal Suspension 2 sprays by Each Nare route daily. 1 each 1    Continuous Blood Gluc Sensor (FREESTYLE GOLD 3 SENSOR) Does not apply Misc 1 each daily. 1 each 11    lactulose (GENERLAC) 10 GM/15ML Oral Solution TAKE 30ML BY MOUTH TWICE DAILY (Patient taking differently: TAKE 30ML BY MOUTH THREE DAILY) 946 mL 11    rifaximin (XIFAXAN) 550 MG Oral Tab Take 1 tablet (550 mg total) by mouth 2 (two) times daily. 60 tablet 11    aMILoride 5 MG Oral Tab Take 2 tablets (10 mg total) by mouth 2 (two) times a day. The patient is to take 20mg per day (Patient taking differently: Take 1 tablet (5 mg total) by mouth in the morning and 1 tablet (5 mg total) before bedtime.) 120 tablet 3    Psyllium 0.52 g Oral Cap       hydrocortisone 2.5 % External Cream 1 yonis - Face      Clobetasol Propionate 0.05 % External Liquid Apply 125 mL topically As Directed.        Past Medical History:   Diagnosis Date    Allergic rhinitis 1970    Ragweed Pollen    Anxiety 2021    Learning / catching up on age related medical issues.    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) 12/29/2020    HE, Non bleeding varices    Obesity     Osteoarthritis     Prediabetes     Psoriasis     Sarcoidosis of lung (HCC)     Sleep apnea     Visual impairment     EYEGLASSES      Social History:  Social History     Socioeconomic History    Marital status:    Tobacco Use    Smoking status: Former     Packs/day: 2.00     Years: 25.00     Additional pack years: 0.00     Total pack years: 50.00      Types: Cigarettes     Quit date: 2014     Years since quittin.8    Smokeless tobacco: Never    Tobacco comments:     16-26, light smoker, quit 26-43, medium smoker 43-58, 59+ quit smoking   Vaping Use    Vaping Use: Never used   Substance and Sexual Activity    Alcohol use: Not Currently     Comment: very light social drinker, 1-2 wine or 1-2 beer, none 2016+    Drug use: No        REVIEW OF SYSTEMS:   Review of Systems       EXAM:   /68 (BP Location: Left arm, Patient Position: Sitting, Cuff Size: large)   Pulse 92   Ht 5' 5\" (1.651 m)   Wt 270 lb (122.5 kg)   SpO2 99%   BMI 44.93 kg/m²     Physical Exam  Vitals reviewed.   Constitutional:       General: He is not in acute distress.     Appearance: He is obese. He is not ill-appearing, toxic-appearing or diaphoretic.   HENT:      Head: Normocephalic.      Mouth/Throat:      Mouth: Mucous membranes are moist.      Pharynx: Oropharynx is clear.   Eyes:      General: No scleral icterus.        Right eye: No discharge.         Left eye: No discharge.      Conjunctiva/sclera: Conjunctivae normal.      Pupils: Pupils are equal, round, and reactive to light.   Cardiovascular:      Rate and Rhythm: Normal rate and regular rhythm.      Pulses: Normal pulses.      Heart sounds: Normal heart sounds.   Pulmonary:      Effort: Pulmonary effort is normal. No respiratory distress.      Breath sounds: Normal breath sounds. No stridor. No wheezing, rhonchi or rales.   Chest:      Chest wall: No tenderness.   Abdominal:      General: Abdomen is flat. Bowel sounds are normal. There is distension.      Palpations: Abdomen is soft.      Tenderness: There is no abdominal tenderness.   Musculoskeletal:      Right lower le+ Pitting Edema present.      Left lower le+ Pitting Edema present.   Skin:     General: Skin is warm.      Coloration: Skin is not jaundiced.   Neurological:      Mental Status: He is alert and oriented to person, place, and time.    Psychiatric:         Mood and Affect: Mood normal.         Judgment: Judgment normal.            ASSESSMENT AND PLAN:   1. Subacute cough  -Ordering CXR.   -Ddx: GERD vs HF vs viral process vs other etiology  -Echo and BNP ordered.   - XR CHEST PA + LAT CHEST (CPT=71046); Future  - benzonatate 200 MG Oral Cap; Take 1 capsule (200 mg total) by mouth 3 (three) times daily as needed for cough.  Dispense: 30 capsule; Refill: 0  - CBC W Differential W Platelet [E]; Future    2. 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 (HCC)  -[facility].   -Repeat labs.   - Comp Metabolic Panel (14) [E]; Future  - BNP (Brain Natriuretic Peptide) [E]; Future  - CARD ECHO 2D DOPPLER (CPT=93306); Future  - Magnesium [E]; Future    3. Bilateral leg edema  -Labs per orders.  -Restart Bumetanide 2 mg BID.  -Echo, r/o HF.   - Comp Metabolic Panel (14) [E]; Future  - BNP (Brain Natriuretic Peptide) [E]; Future  - CARD ECHO 2D DOPPLER (CPT=93306); Future    4. Orthopnea  -R/o HF, Echo, labs, CXR ordered.  -Will need to f/u with Cardiology.   - BNP (Brain Natriuretic Peptide) [E]; Future    5. Class 3 severe obesity due to excess calories with serious comorbidity and body mass index (BMI) of 40.0 to 44.9 in adult (HCC)  -Weight loss advised.     6. Hepatic encephalopathy (HCC)  -CPM, following with Hepatology    7. Pancytopenia (HCC)  -CBC due.     8. Controlled type 2 diabetes mellitus with diabetic nephropathy, without long-term current use of insulin (HCC)  -CPM.    9. Thrombocytopenia (HCC)  -CBC due.        The patient indicates understanding of these issues and agrees to the plan.  The patient is asked to return in 1 week.     The above note was creating using Dragon speech recognition technology. Please excuse any typos.

## 2024-02-19 ENCOUNTER — TELEPHONE (OUTPATIENT)
Dept: INTERNAL MEDICINE CLINIC | Facility: CLINIC | Age: 69
End: 2024-02-19

## 2024-02-19 DIAGNOSIS — D50.0 IRON DEFICIENCY ANEMIA DUE TO CHRONIC BLOOD LOSS: Primary | ICD-10-CM

## 2024-02-19 DIAGNOSIS — J90 PLEURAL EFFUSION: ICD-10-CM

## 2024-02-19 DIAGNOSIS — R06.01 ORTHOPNEA: ICD-10-CM

## 2024-02-19 PROBLEM — E66.813 CLASS 3 SEVERE OBESITY DUE TO EXCESS CALORIES WITH SERIOUS COMORBIDITY AND BODY MASS INDEX (BMI) OF 40.0 TO 44.9 IN ADULT: Status: ACTIVE | Noted: 2024-02-19

## 2024-02-19 PROBLEM — E66.01 CLASS 3 SEVERE OBESITY DUE TO EXCESS CALORIES WITH SERIOUS COMORBIDITY AND BODY MASS INDEX (BMI) OF 40.0 TO 44.9 IN ADULT (HCC): Status: ACTIVE | Noted: 2024-02-19

## 2024-02-19 PROBLEM — E66.813 CLASS 3 SEVERE OBESITY DUE TO EXCESS CALORIES WITH SERIOUS COMORBIDITY AND BODY MASS INDEX (BMI) OF 40.0 TO 44.9 IN ADULT (HCC): Status: ACTIVE | Noted: 2024-02-19

## 2024-02-19 LAB
DEPRECATED HBV CORE AB SER IA-ACNC: 27.8 NG/ML
IRON SATN MFR SERPL: 18 %
IRON SERPL-MCNC: 45 UG/DL
TIBC SERPL-MCNC: 252 UG/DL (ref 250–425)
TRANSFERRIN SERPL-MCNC: 169 MG/DL (ref 215–365)

## 2024-02-19 NOTE — TELEPHONE ENCOUNTER
Called 2x, pt's phone is automatically sending to .  Please have patient/wife review recommendations on his recent lab tests. All instructions and orders have been placed.

## 2024-02-25 ENCOUNTER — PATIENT MESSAGE (OUTPATIENT)
Dept: SURGERY | Facility: CLINIC | Age: 69
End: 2024-02-25

## 2024-02-25 ENCOUNTER — HOSPITAL ENCOUNTER (OUTPATIENT)
Dept: GENERAL RADIOLOGY | Age: 69
End: 2024-02-25
Attending: NURSE PRACTITIONER
Payer: COMMERCIAL

## 2024-02-25 ENCOUNTER — HOSPITAL ENCOUNTER (OUTPATIENT)
Dept: GENERAL RADIOLOGY | Age: 69
Discharge: HOME | End: 2024-02-25
Attending: NURSE PRACTITIONER
Payer: COMMERCIAL

## 2024-02-25 DIAGNOSIS — J90 PLEURAL EFFUSION: ICD-10-CM

## 2024-02-25 DIAGNOSIS — R06.01 ORTHOPNEA: ICD-10-CM

## 2024-02-25 PROCEDURE — 71046 X-RAY EXAM CHEST 2 VIEWS: CPT | Performed by: NURSE PRACTITIONER

## 2024-02-26 ENCOUNTER — HOSPITAL ENCOUNTER (OUTPATIENT)
Dept: CT IMAGING | Facility: HOSPITAL | Age: 69
Discharge: HOME | End: 2024-02-26
Attending: NURSE PRACTITIONER
Payer: COMMERCIAL

## 2024-02-26 ENCOUNTER — OFFICE VISIT (OUTPATIENT)
Dept: INTERNAL MEDICINE CLINIC | Facility: CLINIC | Age: 69
End: 2024-02-26
Payer: COMMERCIAL

## 2024-02-26 VITALS
BODY MASS INDEX: 43.82 KG/M2 | HEIGHT: 65 IN | WEIGHT: 263 LBS | DIASTOLIC BLOOD PRESSURE: 69 MMHG | HEART RATE: 92 BPM | SYSTOLIC BLOOD PRESSURE: 117 MMHG

## 2024-02-26 DIAGNOSIS — J84.10 LUNG GRANULOMA (HCC): ICD-10-CM

## 2024-02-26 DIAGNOSIS — J90 PLEURAL EFFUSION: ICD-10-CM

## 2024-02-26 DIAGNOSIS — R05.2 SUBACUTE COUGH: ICD-10-CM

## 2024-02-26 DIAGNOSIS — F90.0 ATTENTION DEFICIT HYPERACTIVITY DISORDER (ADHD), PREDOMINANTLY INATTENTIVE TYPE: ICD-10-CM

## 2024-02-26 DIAGNOSIS — I85.10 ESOPHAGEAL VARICES IN CIRRHOSIS (HCC): ICD-10-CM

## 2024-02-26 DIAGNOSIS — D69.6 PLATELETS DECREASED (HCC): Primary | ICD-10-CM

## 2024-02-26 DIAGNOSIS — K74.60 ESOPHAGEAL VARICES IN CIRRHOSIS (HCC): ICD-10-CM

## 2024-02-26 DIAGNOSIS — D50.0 IRON DEFICIENCY ANEMIA DUE TO CHRONIC BLOOD LOSS: ICD-10-CM

## 2024-02-26 DIAGNOSIS — D61.818 PANCYTOPENIA (HCC): ICD-10-CM

## 2024-02-26 PROCEDURE — 99214 OFFICE O/P EST MOD 30 MIN: CPT | Performed by: NURSE PRACTITIONER

## 2024-02-26 PROCEDURE — 71250 CT THORAX DX C-: CPT | Performed by: NURSE PRACTITIONER

## 2024-02-26 RX ORDER — FERROUS SULFATE 325(65) MG
325 TABLET ORAL DAILY
COMMUNITY

## 2024-02-26 RX ORDER — AMILORIDE HYDROCHLORIDE 5 MG/1
10 TABLET ORAL 2 TIMES DAILY
Qty: 120 TABLET | Refills: 3 | Status: CANCELLED | OUTPATIENT
Start: 2024-02-26

## 2024-02-26 RX ORDER — DEXTROAMPHETAMINE SACCHARATE, AMPHETAMINE ASPARTATE MONOHYDRATE, DEXTROAMPHETAMINE SULFATE AND AMPHETAMINE SULFATE 6.25; 6.25; 6.25; 6.25 MG/1; MG/1; MG/1; MG/1
25 CAPSULE, EXTENDED RELEASE ORAL EVERY MORNING
Qty: 30 CAPSULE | Refills: 0 | Status: SHIPPED | OUTPATIENT
Start: 2024-02-26 | End: 2024-02-26

## 2024-02-26 RX ORDER — DEXTROAMPHETAMINE SACCHARATE, AMPHETAMINE ASPARTATE MONOHYDRATE, DEXTROAMPHETAMINE SULFATE AND AMPHETAMINE SULFATE 6.25; 6.25; 6.25; 6.25 MG/1; MG/1; MG/1; MG/1
25 CAPSULE, EXTENDED RELEASE ORAL EVERY MORNING
Qty: 30 CAPSULE | Refills: 0 | Status: SHIPPED | OUTPATIENT
Start: 2024-02-26

## 2024-02-26 NOTE — TELEPHONE ENCOUNTER
Patient's wife calling to follow up on refill, stated needs to be faxed due to ecript issues, fax number jis [phone].

## 2024-02-26 NOTE — TELEPHONE ENCOUNTER
Dr Starks, please advise  Clinical staff, please assist    Patient's wife, Minnie calling (name and , COREY verified) to report that [facility] is requesting that patient bring in a paper prescription for the Adderall and they will not accept a fax.     Patient's wife will  the prescription from the office today.     Please call the patient's wife, Minnie when the paper prescription is ready for .

## 2024-02-26 NOTE — TELEPHONE ENCOUNTER
Please review; protocol failed/No Protocol    Patient's wife calling to follow up on refill, stated needs to be faxed due to ecript issues, fax number jis [phone].      Patient Comment: URGENT! [facility] is completely out of this. [facility] at [address], [place] has it in stock. Fax to [phone]. Thank you.     Panel was printed on 12/20/2023  The 01/19/2024 script was discontinued then e-scribed to pharmacy on 01/22/2024  The Feb 18,2024 script that says printed was not discontinued on the panel.  Should that one be discontinued also?    Verified pharmacy e-scribe is still down.     Requested Prescriptions   Pending Prescriptions Disp Refills    Amphetamine-Dextroamphet ER (ADDERALL XR) 25 MG Oral Capsule SR 24 Hr 30 capsule 0     Sig: Take 1 capsule (25 mg total) by mouth every morning.       Controlled Substance Medication Failed - 2/26/2024 10:54 AM        Failed - This medication is a controlled substance - forward to provider to refill           Future Appointments         Provider Department Appt Notes    Tomorrow PRE-ADMISSION TESTING NURSE [facility] Pre Admission Testing     In 5 days EH CARD STRESS ECHO RM 1 [facility] Cardiodiagnostics qa/pjd  CO[facility]  Epic order from LIBBY Baird on 2/17/24 - [facility]    In 2 weeks Peace Starks MD [facility], [address], [place] cough    In 2 months Taniya Coombs MD [facility] 3 MONTH          Recent Outpatient Visits              1 week ago Subacute cough    [facility] [address], [place] Kaylee Johnson APRN    Office Visit    3 weeks ago Urinary retention [R33.9]    [facility] Shania Jacinto PA-C    Office Visit    1 month ago Urinary retention [R33.9]    [facility]    Nurse Only    2 months ago Physical exam, annual    [facility] [address], [place] Tereza,  MD Peace    Office Visit    2 months ago Urinary retention    [facility], [place] Taniya Coombs MD    Office Visit

## 2024-02-26 NOTE — TELEPHONE ENCOUNTER
From: Joe Haney  To: Taniya Malvin  Sent: 2/25/2024 5:51 PM CST  Subject: constant urination    Dr. Guzmán increased the Bumex to 2mg twice a day. Benji is continually urinating. He also has developed pleural effusion. Is there something that can be done to help with this problem? Should we come in sooner than the next scheduled visit?  Minnie Haney, wife

## 2024-02-27 ENCOUNTER — TELEPHONE (OUTPATIENT)
Dept: INTERNAL MEDICINE CLINIC | Facility: CLINIC | Age: 69
End: 2024-02-27

## 2024-02-27 DIAGNOSIS — R05.2 SUBACUTE COUGH: ICD-10-CM

## 2024-02-27 DIAGNOSIS — J90 PLEURAL EFFUSION: Primary | ICD-10-CM

## 2024-02-27 RX ORDER — DEXTROAMPHETAMINE SACCHARATE, AMPHETAMINE ASPARTATE MONOHYDRATE, DEXTROAMPHETAMINE SULFATE AND AMPHETAMINE SULFATE 6.25; 6.25; 6.25; 6.25 MG/1; MG/1; MG/1; MG/1
25 CAPSULE, EXTENDED RELEASE ORAL EVERY MORNING
Qty: 30 CAPSULE | Refills: 0 | OUTPATIENT
Start: 2024-02-27

## 2024-02-27 NOTE — TELEPHONE ENCOUNTER
Patient  wife Minnie calling ( identified name and ,Nini )     States she received a call from the office,no message was left    Wife states patient  had CT of chest yesterday and was wondering if that was what the call was about       Please advise and thank you.      Best call back number: Benji  at [phone]

## 2024-02-27 NOTE — PROGRESS NOTES
Joe Haney is a 68 year old male.  HPI:   Patient presents to clinic today for follow-up.  Patient was seen on February 17, 2024 for a persistent cough that has been present for months.  The cough would occur when laying flat, sleeping, up and active.  He denies any dyspnea on exertion however reports there would be very rare occurrences when he would be experiencing some shortness of breath with the cough.  He has chronic bilateral lower extremity edema which had worsened in the past couple of weeks.  Patient reports his weight fluctuates between 260 and 275 pounds.  He was ordered a chest x-ray which showed a left pleural effusion and left basilar opacities.  He repeated the chest x-ray 1 week later which showed persistent left perihilar/basilar pleural parenchymal abnormality with rounded configuration on the lateral projection which may reflect atelectasis but has progressed.  Slight decrease in left-sided effusion since August 2023.  A CT of the chest was recommended to evaluate for possible parenchymal lesion.  Patient has followed up with his hepatologist and was advised to increase the bumetanide and continue amiloride.  He was advised a low-sodium and high-protein diet.  He was advised to continue rifaximin and zinc supplementation.  He was advised to repeat MRI of the abdomen for which she has an order.  He was advised against driving due to encephalopathy.  He is due to repeat colonoscopy this year 2024 with Dr. Corey due to history of multiple tubular adenomas in 2021.  He reports difficulty with both lactulose and bumetanide as they make it difficult for him to leave the home, he needs to frequently urinate which interrupts his day.  He was advised to start the medications as prescribed to reduce the edema in his legs as well as the pleural effusion.  Patient was also noted to have very low iron levels.  Patient has a history of esophageal varices and cirrhosis.  He was taking oral iron but  not recently.  He was advised to restart and was also referred to hematology to discuss his NO and consider iron infusions.  Patient was also advised to complete an echocardiogram for which she is scheduled.  Patient currently denies any shortness of breath, chest pain, dizziness, headache, vision changes, palpitations, appetite or weight changes.  He does report that he plans to schedule for paracentesis for his ascites soon.    Current Outpatient Medications   Medication Sig Dispense Refill    Ferrous Sulfate 325 (65 Fe) MG Oral Tab Take 1 tablet (325 mg total) by mouth daily.      Amphetamine-Dextroamphet ER (ADDERALL XR) 25 MG Oral Capsule SR 24 Hr Take 1 capsule (25 mg total) by mouth every morning. 30 capsule 0    Amphetamine-Dextroamphet ER (ADDERALL XR) 25 MG Oral Capsule SR 24 Hr Take 1 capsule (25 mg total) by mouth every morning. 30 capsule 0    lactulose 10 GM/15ML Oral Solution Take 30 mL (20 g total) by mouth 2 (two) times daily.      benzonatate 200 MG Oral Cap Take 1 capsule (200 mg total) by mouth 3 (three) times daily as needed for cough. 30 capsule 0    Potassium Chloride ER 20 MEQ Oral Tab CR TAKE 1 TABLET BY MOUTH TWICE DAILY FOR 2 DAYS. CONTINUE 1 TABLET DAILY THEREAFTER 90 tablet 0    escitalopram 10 MG Oral Tab Take 1 tablet (10 mg total) by mouth daily. 90 tablet 3    tamsulosin 0.4 MG Oral Cap Take 1 capsule (0.4 mg total) by mouth daily. Take 1/2 hour following the same meal each day 90 capsule 3    Repaglinide 1 MG Oral Tab Take 1 tablet (1 mg total) by mouth 3 (three) times daily before meals.      bumetanide 2 MG Oral Tab Take 1 tablet (2 mg total) by mouth 2 (two) times daily.      Amphetamine-Dextroamphet ER (ADDERALL XR) 25 MG Oral Capsule SR 24 Hr Take 1 capsule (25 mg total) by mouth daily. 30 capsule 0    omega-3 fatty acids 1000 MG Oral Cap Take 1,000 mg by mouth at bedtime.      Multiple Vitamin (MULTIVITAMIN ADULT OR) Take 1 tablet by mouth daily.      Probiotic Product  (PROBIOTIC-10 OR) Take 1 capsule by mouth daily.      fluticasone propionate 50 MCG/ACT Nasal Suspension 2 sprays by Each Nare route daily. 1 each 1    Continuous Blood Gluc Sensor (FREESTYLE GOLD 3 SENSOR) Does not apply Misc 1 each daily. 1 each 11    lactulose (GENERLAC) 10 GM/15ML Oral Solution TAKE 30ML BY MOUTH TWICE DAILY (Patient taking differently: TAKE 30ML BY MOUTH THREE DAILY) 946 mL 11    rifaximin (XIFAXAN) 550 MG Oral Tab Take 1 tablet (550 mg total) by mouth 2 (two) times daily. 60 tablet 11    aMILoride 5 MG Oral Tab Take 2 tablets (10 mg total) by mouth 2 (two) times a day. The patient is to take 20mg per day (Patient taking differently: Take 1 tablet (5 mg total) by mouth in the morning and 1 tablet (5 mg total) before bedtime.) 120 tablet 3    Psyllium 0.52 g Oral Cap       hydrocortisone 2.5 % External Cream 1 yonis - Face      Clobetasol Propionate 0.05 % External Liquid Apply 125 mL topically As Directed.        Past Medical History:   Diagnosis Date    Allergic rhinitis     Ragweed Pollen    Anxiety     Learning / catching up on age related medical issues.    Attention deficit hyperactivity disorder (ADHD)     BPH (benign prostatic hyperplasia)     Depression     Mild, related to age issues    Diabetes (HCC)     Esophageal reflux     Essential hypertension     High blood pressure     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 (HCC) 2020    HE, Non bleeding varices    Obesity     Osteoarthritis     Prediabetes     Psoriasis     Sarcoidosis of lung (HCC)     Sleep apnea     Visual impairment     EYEGLASSES      Social History:  Social History     Socioeconomic History    Marital status:    Tobacco Use    Smoking status: Former     Packs/day: 2.00     Years: 25.00     Additional pack years: 0.00     Total pack years: 50.00     Types: Cigarettes     Quit date: 2014     Years since quittin.8    Smokeless tobacco: Never    Tobacco comments:     16-, light smoker, quit  [age], medium smoker [age], 59+ quit smoking   Vaping Use    Vaping Use: Never used   Substance and Sexual Activity    Alcohol use: Not Currently     Comment: very light social drinker, 1-2 wine or 1-2 beer, none 2016+    Drug use: No        REVIEW OF SYSTEMS:   Review of Systems   All other systems reviewed and are negative.         EXAM:   /69 (BP Location: Right arm, Patient Position: Sitting, Cuff Size: large)   Pulse 92   Ht 5' 5\" (1.651 m)   Wt 263 lb (119.3 kg)   BMI 43.77 kg/m²     Physical Exam  Vitals reviewed.   Constitutional:       General: He is not in acute distress.     Appearance: He is obese.   HENT:      Head: Normocephalic.   Eyes:      Conjunctiva/sclera: Conjunctivae normal.      Pupils: Pupils are equal, round, and reactive to light.   Cardiovascular:      Rate and Rhythm: Normal rate and regular rhythm.      Pulses: Normal pulses.      Heart sounds: Normal heart sounds.   Pulmonary:      Effort: Pulmonary effort is normal. No respiratory distress.      Breath sounds: Normal breath sounds. No stridor. No wheezing, rhonchi or rales.   Chest:      Chest wall: No tenderness.   Abdominal:      General: There is distension.      Palpations: There is no fluid wave.      Tenderness: There is no abdominal tenderness.   Skin:     General: Skin is warm.      Coloration: Skin is not jaundiced.   Neurological:      General: No focal deficit present.      Mental Status: He is alert and oriented to person, place, and time.   Psychiatric:         Mood and Affect: Mood normal.         Behavior: Behavior normal.         Thought Content: Thought content normal.         Judgment: Judgment normal.            ASSESSMENT AND PLAN:   1. Platelets decreased (HCC)  -Platelets currently 74, patient has been referred to hematology for evaluation of iron deficiency anemia.  History of esophageal varices and cirrhosis.  He will continue oral iron until evaluation with hematology for possible iron  infusions.  -Continue follow-up with hepatologist at [facility].    2. Esophageal varices in cirrhosis (HCC)  Stable, continue follow-up with hepatologist at [facility]    3. Subacute cough  Referral to pulmonologist, abnormal chest x-ray showing pleural effusion, cough has been present a few months, will proceed with CT chest  - CT CHEST (CPT=39193); Future    4. Iron deficiency anemia due to chronic blood loss  Patient referred to hematology to discuss iron infusions.  For now continue oral iron.  Repeat CBC and iron studies in 2 to 4 weeks.    5. Pleural effusion  Stat CT chest ordered today, will refer to pulmonology for further evaluation.  - Pulmonary Referral - In Network  - CT CHEST (CPT=60970); Future    6. Attention deficit hyperactivity disorder (ADHD), predominantly inattentive type  Stable with Adderall, refills provided  - Amphetamine-Dextroamphet ER (ADDERALL XR) 25 MG Oral Capsule SR 24 Hr; Take 1 capsule (25 mg total) by mouth every morning.  Dispense: 30 capsule; Refill: 0  - Amphetamine-Dextroamphet ER (ADDERALL XR) 25 MG Oral Capsule SR 24 Hr; Take 1 capsule (25 mg total) by mouth every morning.  Dispense: 30 capsule; Refill: 0    7. Pancytopenia (HCC)  Referral to hematology    8. Lung granuloma (HCC)  CT imaging ordered     Pt will schedule paracentesis.     The patient indicates understanding of these issues and agrees to the plan.  The patient is asked to return in 2 weeks.     The above note was creating using Dragon speech recognition technology. Please excuse any typos.

## 2024-02-27 NOTE — TELEPHONE ENCOUNTER
He could drop off a urine sample to ensure no UTI  He could also come in for a nurse visit to check a PVR if UA is negative

## 2024-02-27 NOTE — TELEPHONE ENCOUNTER
Was speaking with patient but patient keep hanging up the phone.     If patient calls back, please ask if patient received Adderall.

## 2024-02-28 RX ORDER — BENZONATATE 200 MG/1
200 CAPSULE ORAL 3 TIMES DAILY PRN
Qty: 30 CAPSULE | Refills: 0 | OUTPATIENT
Start: 2024-02-28

## 2024-02-29 DIAGNOSIS — R05.2 SUBACUTE COUGH: ICD-10-CM

## 2024-02-29 RX ORDER — BENZONATATE 200 MG/1
200 CAPSULE ORAL 3 TIMES DAILY PRN
Qty: 30 CAPSULE | Refills: 0 | Status: SHIPPED | OUTPATIENT
Start: 2024-02-29

## 2024-03-01 ENCOUNTER — HOSPITAL ENCOUNTER (OUTPATIENT)
Dept: ULTRASOUND IMAGING | Facility: HOSPITAL | Age: 69
Discharge: HOME | End: 2024-03-01
Attending: INTERNAL MEDICINE
Payer: COMMERCIAL

## 2024-03-01 ENCOUNTER — APPOINTMENT (OUTPATIENT)
Dept: ADMINISTRATIVE | Facility: HOSPITAL | Age: 69
End: 2024-03-01
Payer: COMMERCIAL

## 2024-03-01 DIAGNOSIS — K74.60 CIRRHOSIS OF LIVER WITH ASCITES (HCC): ICD-10-CM

## 2024-03-01 DIAGNOSIS — R18.8 CIRRHOSIS OF LIVER WITH ASCITES (HCC): ICD-10-CM

## 2024-03-01 LAB
INR BLD: 1.23 (ref 0.8–1.2)
PROTHROMBIN TIME: 16.3 SECONDS (ref 11.6–14.8)

## 2024-03-01 PROCEDURE — 49083 ABD PARACENTESIS W/IMAGING: CPT | Performed by: INTERNAL MEDICINE

## 2024-03-01 PROCEDURE — 36415 COLL VENOUS BLD VENIPUNCTURE: CPT | Performed by: NURSE PRACTITIONER

## 2024-03-01 PROCEDURE — 85610 PROTHROMBIN TIME: CPT | Performed by: NURSE PRACTITIONER

## 2024-03-01 NOTE — IMAGING NOTE
1345: Pt to ultrasound room scouts taken by santosh CALDERON.     Hx taken procedure explained questions answered     VS: HR 98 /54    1356 Patient consented.    Pt to get albumin 25% 25 grams:  no     1409 ROBIN SHIN  Here to access- scanning completed and reviewed.      PLATELETS =  74    PT=  16.3   INR= 1.23    1410 Timeout taken    1415 Chloro prep  as skin prep sterile drape applied lidocaine 1% 10 milligrams per ml from kit was given for anesthetic affect 10 ml total given.  Incision made with scalpel.    1420 5 Greenlandic  10 cm yueh catheter placed RIGHT  FLANK  abdomen    Fluid aspirated for labs: NO     1425 Catheter connected  to tubing then connected to [facility] Direct to Drain Medical Disposal System. Draining begins continuously via  [facility] System. CLEAR LIGHT YELLOW FLUID DRAINING    1429: ~972 ML DRAINED HR 97 /60    1437: ~1729 ml DRAINED HR 95 /51    1455  Draining completed.     Patient re scanned. total amount drained 2296 ml.  Drain was dc'd.       Pressure to site for 5 minutes.  Area was cleaned steri strips, gauze and tegaderm applied  to site.    1500: HR 98 /63     Post instructions was given verbally and written  after visit summary sheet provided to patient.    1508 Discharged    1510  [facility] system was cleaned after procedure by LETICIA DAS RN

## 2024-03-02 ENCOUNTER — HOSPITAL ENCOUNTER (OUTPATIENT)
Dept: CV DIAGNOSTICS | Facility: HOSPITAL | Age: 69
Discharge: HOME | End: 2024-03-02
Attending: NURSE PRACTITIONER
Payer: COMMERCIAL

## 2024-03-02 DIAGNOSIS — R60.0 BILATERAL LEG EDEMA: ICD-10-CM

## 2024-03-02 DIAGNOSIS — K75.81 LIVER CIRRHOSIS SECONDARY TO NASH (HCC): ICD-10-CM

## 2024-03-02 DIAGNOSIS — K74.60 LIVER CIRRHOSIS SECONDARY TO NASH (HCC): ICD-10-CM

## 2024-03-02 PROCEDURE — 93306 TTE W/DOPPLER COMPLETE: CPT | Performed by: NURSE PRACTITIONER

## 2024-03-08 ENCOUNTER — TELEPHONE (OUTPATIENT)
Dept: INTERNAL MEDICINE CLINIC | Facility: CLINIC | Age: 69
End: 2024-03-08

## 2024-03-08 ENCOUNTER — NURSE TRIAGE (OUTPATIENT)
Dept: INTERNAL MEDICINE CLINIC | Facility: CLINIC | Age: 69
End: 2024-03-08

## 2024-03-08 ENCOUNTER — TELEPHONE (OUTPATIENT)
Dept: SURGERY | Facility: CLINIC | Age: 69
End: 2024-03-08

## 2024-03-08 DIAGNOSIS — N39.0 RECURRENT UTI: Primary | ICD-10-CM

## 2024-03-08 NOTE — TELEPHONE ENCOUNTER
Called wife, verified her  Benji full name and . Patient also on the phone. Patient says he noticed increased in urination that started a week ago and he says urine looks cloudy. He says he has to get up every hour to go to the bathroom but he also has been having incontinence issues. He denies any other symptoms. Patient is also stating he is taking water pills. I let patient know that we can check his urine to make sure he doesn't have an active urinary infection. I let them know we will place orders in and they can go to the lab at their earliest convenience. I also told them we will be calling back with the results. Patient and wife verbalized understanding and have no further questions.

## 2024-03-08 NOTE — TELEPHONE ENCOUNTER
Verified name and  of patient.    Wife of patient calling as follow up from echo test results.    She states that patient has been scheduled with pulmonologist Dr. Valverde on 24. She is asking if another provider may be able to see him sooner.    She was advised to call pulmonology to ask if another provider within their practice can see patient sooner. She verbalized understanding.    Future Appointments   Date Time Provider Department Center   3/13/2024  4:40 PM Peace Starks MD [id number] [place]   3/25/2024  2:00 PM Peace Starks MD [facility] [place]   2024  3:15 PM Taniya Coombs MD [facility]   2024  4:00 PM Malachi Valverde MD [facility], your echo shows normal blood flow.  There is just some mild abnormality with your heart when it relaxes.  There is no fluid buildup which is good.  I still think it would be a good idea for you to see the lung specialist, the pulmonologist to go over those CT results with you.  I see you are scheduled in , please keep that appointment.   Written by LIBBY Baird on 3/4/2024  7:28 PM CST  Seen by proxy Minnie Haney on 3/8/2024 11:46 AM

## 2024-03-08 NOTE — TELEPHONE ENCOUNTER
DR Starks=wife would like to share the informations below and requesting for additional advise,thanks.           Action Requested: Summary for Provider     []  Critical Lab, Recommendations Needed  [x] Need Additional Advice  []   FYI    []   Need Orders  [] Need Medications Sent to Pharmacy  []  Other     SUMMARY: see within 2 weeks, patient has an upcoming  appointment already with Dr Starks on 3/13/24. Advised to wait for DR Coombs's office to call them ===see 3/8/24  urology acute  telephone encounter.  Advised urgent care or immediate care  for worsening symptoms and emergency room  for shortness of breath , chest pain, no urine output, bleeding, severe weakness and signs of dehydration .            Future Appointments   Date Time Provider Department Center   3/13/2024  4:40 PM Peace Starks MD [id number] [place]   3/25/2024  2:00 PM Peace Starks MD [id number] [place]   5/6/2024  3:15 PM Taniya Coombs MD [facility]   6/4/2024  4:00 PM Malachi Valverde MD [facility]         Reason for call: Incontinence  Onset: chronic         Incontinence for a while now, has been taking tamsulosin, but last night  became continous and out  of control,he wears diaper ,  fatigue, move very slowly, no bleeding, no shortness of breath,coughs a lot, chronic ble edmea, on diuretics,   continuously drinking fluids,no fever, AU=495/62 SR=463 (just came from the bathroom ).          Reason for Disposition   Can't control passage of urine (i.e., urinary incontinence, wetting self) and present > 2 weeks    Protocols used: Urinary Symptoms-A-OH

## 2024-03-13 ENCOUNTER — TELEPHONE (OUTPATIENT)
Dept: INTERNAL MEDICINE CLINIC | Facility: CLINIC | Age: 69
End: 2024-03-13

## 2024-03-13 NOTE — TELEPHONE ENCOUNTER
Wife calling for Pt. Verified Pt's name and .  Per wife, Pt was just seen by Dr. Thomas and advised Pt to go to [facility] ER due to fluid overload.  Pt's hemoglobin is 6.3, but cannot receive blood transfusion due to fluid overload.  Pt has gained weight rapidly per wife.  They are on the way to [facility] ER now. [facility] has a liver team specialist and was advised to go there instead of EM.  Pt was supposed to see Dr. Starks today. Appointment canceled.    Wife wants to make sure Dr. Starks is aware of testings, notes that will be done at [facility]

## 2024-03-25 ENCOUNTER — OFFICE VISIT (OUTPATIENT)
Dept: INTERNAL MEDICINE CLINIC | Facility: CLINIC | Age: 69
End: 2024-03-25

## 2024-03-25 ENCOUNTER — TELEPHONE (OUTPATIENT)
Dept: SURGERY | Facility: CLINIC | Age: 69
End: 2024-03-25

## 2024-03-25 VITALS
DIASTOLIC BLOOD PRESSURE: 64 MMHG | BODY MASS INDEX: 41.65 KG/M2 | SYSTOLIC BLOOD PRESSURE: 98 MMHG | HEART RATE: 96 BPM | RESPIRATION RATE: 16 BRPM | HEIGHT: 65 IN | WEIGHT: 250 LBS

## 2024-03-25 DIAGNOSIS — K51.418: ICD-10-CM

## 2024-03-25 DIAGNOSIS — R18.8 OTHER ASCITES: ICD-10-CM

## 2024-03-25 DIAGNOSIS — K75.81 LIVER CIRRHOSIS SECONDARY TO NASH (HCC): ICD-10-CM

## 2024-03-25 DIAGNOSIS — D50.8 OTHER IRON DEFICIENCY ANEMIA: ICD-10-CM

## 2024-03-25 DIAGNOSIS — E11.9 TYPE 2 DIABETES MELLITUS WITHOUT COMPLICATION, WITHOUT LONG-TERM CURRENT USE OF INSULIN (HCC): Primary | ICD-10-CM

## 2024-03-25 DIAGNOSIS — R05.2 SUBACUTE COUGH: ICD-10-CM

## 2024-03-25 DIAGNOSIS — K74.60 LIVER CIRRHOSIS SECONDARY TO NASH (HCC): ICD-10-CM

## 2024-03-25 DIAGNOSIS — F90.0 ATTENTION DEFICIT HYPERACTIVITY DISORDER (ADHD), PREDOMINANTLY INATTENTIVE TYPE: ICD-10-CM

## 2024-03-25 DIAGNOSIS — I85.10 SECONDARY ESOPHAGEAL VARICES WITHOUT BLEEDING (HCC): ICD-10-CM

## 2024-03-25 PROCEDURE — 1126F AMNT PAIN NOTED NONE PRSNT: CPT | Performed by: INTERNAL MEDICINE

## 2024-03-25 PROCEDURE — 3078F DIAST BP <80 MM HG: CPT | Performed by: INTERNAL MEDICINE

## 2024-03-25 PROCEDURE — 3008F BODY MASS INDEX DOCD: CPT | Performed by: INTERNAL MEDICINE

## 2024-03-25 PROCEDURE — 99214 OFFICE O/P EST MOD 30 MIN: CPT | Performed by: INTERNAL MEDICINE

## 2024-03-25 PROCEDURE — 3074F SYST BP LT 130 MM HG: CPT | Performed by: INTERNAL MEDICINE

## 2024-03-25 RX ORDER — ZINC SULFATE 50(220)MG
220 CAPSULE ORAL DAILY
COMMUNITY

## 2024-03-25 RX ORDER — PANTOPRAZOLE SODIUM 40 MG/1
40 TABLET, DELAYED RELEASE ORAL
COMMUNITY

## 2024-03-25 NOTE — TELEPHONE ENCOUNTER
Called patient, verified name and . Patient says he's been having incontinence issues especially at night and he's been asking a whole diaper and it is starting to bother him a lot. He is asking if he can have a catheter placed. Also he is asking for external cathter. Then patient asking if he could be seen sooner than May. I was able to scheduled patient to be seen this week with MD. Coombs.   Patient agreed, verbalized understandings and has no further questions.

## 2024-03-25 NOTE — TELEPHONE ENCOUNTER
Per spouse pt has been having a lot of incontinence issues lately, unsure how to manage this, requesting to speak to RN. Please call thank you.

## 2024-03-26 NOTE — PROGRESS NOTES
Subjective:     Patient ID: Joe Haney is a 68 year old male.  Presents for follow-up on cough, diabetes, liver cirrhosis, anemia.    HPI  Patient was treated [facility] recently, received blood transfusion, 5 infusions of iron.  Found to have more esophageal varices which required attention and a large mass in the colon which showed dysplasia, was advised to see specialist at [facility] in [place] in liver transplant department, and have esophageal varices treated and undergo removal of the large polyp.  He will be seeing specialist there.  Upon discharge from the hospital diuretic was decreased currently he is taking Bumex 1 mg twice a day and takes potassium supplementation, leg edema significantly improved.  He continues to cough especially when he lays down, chest x-ray during recent hospitalization showed pulmonary congestion.  He is urinating without control at night wearing diapers which are not helpful, during the day seems able to control urination.  Questioning why it is happening and what can be done about that, will be seeing urologist this week  He does not check blood sugar frequently at home, during hospitalization levels were acceptable.  He has been taking repaglinide 1 mg 3 times a day with meals  Labs upon discharge 4 days ago showed hemoglobin 8.5 hematocrit 28 platelets 87 BUN 24, creatinine 1.28  Complains of fatigue, feels lethargic on and off, minimal physical activities.  Wife is monitoring medication intake.  They are trying to improve diet and follow low-salt diet,    Current Outpatient Medications   Medication Sig Dispense Refill    pantoprazole 40 MG Oral Tab EC Take 1 tablet (40 mg total) by mouth before breakfast.      zinc sulfate 220 (50 Zn) MG Oral Cap Take 1 capsule (220 mg total) by mouth daily.      benzonatate 200 MG Oral Cap Take 1 capsule (200 mg total) by mouth 3 (three) times daily as needed for cough. 30 capsule 0    Ferrous Sulfate  325 (65 Fe) MG Oral Tab Take 1 tablet (325 mg total) by mouth daily.      Amphetamine-Dextroamphet ER (ADDERALL XR) 25 MG Oral Capsule SR 24 Hr Take 1 capsule (25 mg total) by mouth every morning. 30 capsule 0    Amphetamine-Dextroamphet ER (ADDERALL XR) 25 MG Oral Capsule SR 24 Hr Take 1 capsule (25 mg total) by mouth every morning. 30 capsule 0    lactulose 10 GM/15ML Oral Solution Take 30 mL (20 g total) by mouth as needed.      Potassium Chloride ER 20 MEQ Oral Tab CR TAKE 1 TABLET BY MOUTH TWICE DAILY FOR 2 DAYS. CONTINUE 1 TABLET DAILY THEREAFTER (Patient taking differently: Take 10 mEq by mouth in the morning and 10 mEq before bedtime. TAKE 1 TABLET BY MOUTH TWICE DAILY FOR 2 DAYS. CONTINUE 1 TABLET DAILY THEREAFTER.) 90 tablet 0    escitalopram 10 MG Oral Tab Take 1 tablet (10 mg total) by mouth daily. 90 tablet 3    tamsulosin 0.4 MG Oral Cap Take 1 capsule (0.4 mg total) by mouth daily. Take 1/2 hour following the same meal each day (Patient taking differently: Take 1 capsule (0.4 mg total) by mouth at bedtime. Take 1/2 hour following the same meal each day) 90 capsule 3    Repaglinide 1 MG Oral Tab Take 2 tablets (2 mg total) by mouth 3 (three) times daily before meals.      bumetanide 2 MG Oral Tab Take 0.5 tablets (1 mg total) by mouth 2 (two) times daily.      omega-3 fatty acids 1000 MG Oral Cap Take 1,000 mg by mouth at bedtime.      Multiple Vitamin (MULTIVITAMIN ADULT OR) Take 1 tablet by mouth daily.      Probiotic Product (PROBIOTIC-10 OR) Take 1 capsule by mouth daily.      Continuous Blood Gluc Sensor (FREESTYLE GOLD 3 SENSOR) Does not apply Misc 1 each daily. 1 each 11    rifaximin (XIFAXAN) 550 MG Oral Tab Take 1 tablet (550 mg total) by mouth 2 (two) times daily. 60 tablet 11    aMILoride 5 MG Oral Tab Take 2 tablets (10 mg total) by mouth 2 (two) times a day. The patient is to take 20mg per day (Patient taking differently: Take 2 tablets (10 mg total) by mouth in the morning and 2 tablets  (10 mg total) before bedtime.) 120 tablet 3    hydrocortisone 2.5 % External Cream as needed.      Clobetasol Propionate 0.05 % External Liquid Apply 125 mL topically As Directed. shampoo      fluticasone propionate 50 MCG/ACT Nasal Suspension 2 sprays by Each Nare route daily. (Patient not taking: Reported on 3/25/2024) 1 each 1    Psyllium 0.52 g Oral Cap Not using at this time (Patient not taking: Reported on 3/25/2024)       Allergies:No Known Allergies    Past Medical History:   Diagnosis Date    Allergic rhinitis 1970    Ragweed Pollen    Anxiety 2021    Learning / catching up on age related medical issues.    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) 12/29/2020    HE, Non bleeding varices    Neuropathy     hands and feet    Obesity     Osteoarthritis     Psoriasis     Sarcoidosis of lung (HCC)     Sleep apnea     Visual impairment     EYEGLASSES      Past Surgical History:   Procedure Laterality Date    CATARACT Bilateral     IOL's    COLONOSCOPY N/A 10/18/2018    Procedure: COLONOSCOPY;  Surgeon: Jude Escamilla MD;  Location: [facility] ENDOSCOPY    COLONOSCOPY N/A 02/18/2019    Jude Escamilla MD;  Polyps (TA)   Repeat 2024    COLONOSCOPY  4/21/21= Polyps (TA)    Repeat 2024    COLONOSCOPY N/A 04/21/2021    Procedure: COLONOSCOPY with HOT SNARE POLYPECTOMY, ESOPHAGOGASTRODUODENOSCOPY (EGD);  Surgeon: Flip Corey MD;  Location:  ENDOSCOPY    HIP REPLACEMENT SURGERY  Sept 2015    Right Hip    SKIN SURGERY  09/07/2018    excision back melanoma     TONSILLECTOMY  1961    TOTAL HIP REPLACEMENT Right 09/2015    UPPER GI ENDOSCOPY PERFORMED  4/21/21= Grade 2 varices    UPPER GI ENDOSCOPY,LIGAT VARIX  07/25/2023    Variceal bleed, banded in [place]    VASECTOMY  1993      Family  History   Problem Relation Age of Onset    Cancer Father         Melanoma    Other (Other) Mother         strokes    Cancer Mother         Colon Cancer    Dementia Mother         NOT Alzheimers, but cognitive impairment    Hypertension Mother         Hydrochlorothiazide    Other (Other) Daughter         ASTHMA    Other (Other) Son         ASTHMA    Anemia Brother         Uses Ferrous Sulphate 325mg    Asthma Brother     Cancer Brother         Colon Cancer & Squamus Cell skin cancer    Asthma Son     Obesity Son     Psychiatric Son         ADHD    Asthma Son     Asthma Daughter     Obesity Daughter     Cancer Sister         Melanoma      Social History:   Social History     Socioeconomic History    Marital status:    Tobacco Use    Smoking status: Former     Packs/day: 2.00     Years: 25.00     Additional pack years: 0.00     Total pack years: 50.00     Types: Cigarettes     Quit date: 2014     Years since quittin.9    Smokeless tobacco: Never    Tobacco comments:     16-26, light smoker, quit 26-43, medium smoker 43-58, 59+ quit smoking   Vaping Use    Vaping Use: Never used   Substance and Sexual Activity    Alcohol use: Not Currently     Comment: very light social drinker, 1-2 wine or 1-2 beer, none     Drug use: No        BP 98/64 (BP Location: Left arm, Patient Position: Sitting, Cuff Size: large)   Pulse 96   Resp 16   Ht 5' 5\" (1.651 m)   Wt 250 lb (113.4 kg)   BMI 41.60 kg/m²    Physical Exam  Constitutional:       Appearance: Normal appearance. He is obese.   HENT:      Head: Normocephalic and atraumatic.   Eyes:      General: No scleral icterus.     Extraocular Movements: Extraocular movements intact.      Pupils: Pupils are equal, round, and reactive to light.   Neck:      Vascular: No carotid bruit.   Cardiovascular:      Rate and Rhythm: Normal rate and regular rhythm.      Heart sounds: No murmur heard.     No gallop.   Pulmonary:      Effort: Pulmonary effort is normal.       Breath sounds: No wheezing or rhonchi.   Abdominal:      General: Bowel sounds are normal.      Palpations: Abdomen is soft. There is no mass.      Tenderness: There is no abdominal tenderness. There is no guarding or rebound.   Musculoskeletal:         General: Normal range of motion.      Cervical back: Normal range of motion and neck supple.      Right lower leg: Edema (+1 tight edema below knee) present.      Left lower leg: Edema (+1 tight edema below knee) present.   Lymphadenopathy:      Cervical: No cervical adenopathy.   Skin:     General: Skin is warm.      Coloration: Skin is not jaundiced.      Comments: Very dry skin on both lower extremities, healing scab on the right anterior shin does not look suspicious for melanoma patch of dry skin, skin slightly erythematous no evidence of infection   Neurological:      General: No focal deficit present.      Mental Status: He is alert and oriented to person, place, and time. Mental status is at baseline.   Psychiatric:         Mood and Affect: Mood normal.         Behavior: Behavior normal.         Thought Content: Thought content normal.         Assessment & Plan:       ICD-10-CM    1. Type 2 diabetes mellitus without complication, without long-term current use of insulin (HCC) control is acceptable, continue Prandin 1 mg 3 times a day with meals E11.9       2. Other iron deficiency anemia stable clinically, will await her body response to iron infusions, follow-up with hematology oncology as planned D50.8       3. 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 Attention deficit hyperactivity disorder (ADHD), predominantly inattentive type stable will continue Adderall for now F90.0       5. Subacute cough etiology not clear possible element of CHF?  Will need more diuretics again with close observation R05.2       6. Other ascites stable status post paracentesis R18.8    7.        Esophageal varices will be treated by gastroenterology    8.      Colon polyp requires removal high risk for procedures patient will be seen at [facility] hepatology department .      Meds This Visit:  Requested Prescriptions      No prescriptions requested or ordered in this encounter       Imaging & Referrals:  None

## 2024-03-27 ENCOUNTER — TELEPHONE (OUTPATIENT)
Dept: INTERNAL MEDICINE CLINIC | Facility: CLINIC | Age: 69
End: 2024-03-27

## 2024-03-27 NOTE — TELEPHONE ENCOUNTER
Per wife of patient, patient needs a prior authorization  for Home Health, wife would like patient to go to the [facility] health company of [facility] patient says that Dr Starks know her. Per wife of patient, patient needs the Home Health as soon as possible.

## 2024-03-29 ENCOUNTER — OFFICE VISIT (OUTPATIENT)
Dept: SURGERY | Facility: CLINIC | Age: 69
End: 2024-03-29

## 2024-03-29 DIAGNOSIS — R39.9 LOWER URINARY TRACT SYMPTOMS: Primary | ICD-10-CM

## 2024-03-29 PROCEDURE — 99213 OFFICE O/P EST LOW 20 MIN: CPT | Performed by: UROLOGY

## 2024-03-29 RX ORDER — TAMSULOSIN HYDROCHLORIDE 0.4 MG/1
0.8 CAPSULE ORAL DAILY
Qty: 180 CAPSULE | Refills: 3 | Status: SHIPPED | OUTPATIENT
Start: 2024-03-29 | End: 2025-03-24

## 2024-03-29 NOTE — PROGRESS NOTES
Taniya Coombs MD  Department of Urology  [address]  [place] [address]    T: [phone]  F: [phone]    To: Peace Starks MD   [address] [place] [address]    Re: Joe Haney   MRN: [id number]  : 3/27/1955    Dear Peace Starks MD,    Today I had the pleasure of seeing Joe Haney in my clinic. As you know, Mr. Haney is a pleasant 69 year old year old male who I am seeing for followup. Patient was last seen in this department on 2024.    Briefly, this is a patient of Dr. Kwon. He last saw patient on 2022 for ED. He saw me in the clinic on 2023 as he could not get in with Dr. Darnell. He made another appointment with me today.     At last visit, patient has been noted to have recurrent enterococcal UTIs. Last cross sectional imaging was  which demonstrated a 2mm nonobstructing stone.          We recommended cole catheter and tamsulosin 0.4mg at bedtime.    Today he noted increased urination.           PAST MEDICAL HISTORY:  Past Medical History:   Diagnosis Date    Allergic rhinitis     Ragweed Pollen    Anxiety     Learning / catching up on age related medical issues.    Attention deficit hyperactivity disorder (ADHD)     BPH (benign prostatic hyperplasia)     Depression     Mild, related to age issues    Diabetes (HCC)     Esophageal reflux     Essential hypertension     High blood pressure     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 (HCC) 2020    HE, Non bleeding varices    Neuropathy     hands and feet    Obesity     Osteoarthritis     Psoriasis     Sarcoidosis of lung (HCC)     Sleep apnea     Visual impairment     EYEGLASSES        PAST SURGICAL HISTORY:  Past Surgical History:   Procedure Laterality Date    CATARACT Bilateral     IOL's    COLONOSCOPY N/A 10/18/2018    Procedure: COLONOSCOPY;  Surgeon: Jude Escamilla MD;  Location: [facility] ENDOSCOPY    COLONOSCOPY N/A 2019    Jude Escamilla  MD Mauricio;  Polyps (TA)   Repeat 2024    COLONOSCOPY  4/21/21= Polyps (TA)    Repeat 2024    COLONOSCOPY N/A 04/21/2021    Procedure: COLONOSCOPY with HOT SNARE POLYPECTOMY, ESOPHAGOGASTRODUODENOSCOPY (EGD);  Surgeon: Flip Corey MD;  Location:  ENDOSCOPY    HIP REPLACEMENT SURGERY  Sept 2015    Right Hip    SKIN SURGERY  09/07/2018    excision back melanoma     TONSILLECTOMY  1961    TOTAL HIP REPLACEMENT Right 09/2015    UPPER GI ENDOSCOPY PERFORMED  4/21/21= Grade 2 varices    UPPER GI ENDOSCOPY,LIGAT VARIX  07/25/2023    Variceal bleed, banded in [place]    VASECTOMY  1993         ALLERGIES:  No Known Allergies      MEDICATIONS:  Current Outpatient Medications   Medication Instructions    aMILoride (MIDAMOR) 10 mg, Oral, 2 times daily, The patient is to take 20mg per day    Amphetamine-Dextroamphet ER (ADDERALL XR) 25 MG Oral Capsule SR 24 Hr 25 mg, Oral, Every morning    Amphetamine-Dextroamphet ER (ADDERALL XR) 25 MG Oral Capsule SR 24 Hr 25 mg, Oral, Every morning    benzonatate (TESSALON) 200 mg, Oral, 3 times daily PRN    bumetanide (BUMEX) 1 mg, Oral, 2 times daily    Clobetasol Propionate 0.05 % External Liquid 125 mL, Topical, As Directed, shampoo    Continuous Blood Gluc Sensor (FREESTYLE GOLD 3 SENSOR) Does not apply Misc 1 each, Does not apply, Daily    escitalopram (LEXAPRO) 10 mg, Oral, Daily    Ferrous Sulfate 325 mg, Oral, Daily    fluticasone propionate 50 MCG/ACT Nasal Suspension 2 sprays, Each Nare, Daily    hydrocortisone 2.5 % External Cream as needed.    lactulose 10 GM/15ML Oral Solution 30 mL, Oral, As needed    Multiple Vitamin (MULTIVITAMIN ADULT OR) 1 tablet, Oral, Daily    omega-3 fatty acids (FISH OIL) 1,000 mg, Oral, Nightly    pantoprazole (PROTONIX) 40 mg, Oral, Before breakfast    Potassium Chloride ER 20 MEQ Oral Tab CR TAKE 1 TABLET BY MOUTH TWICE DAILY FOR 2 DAYS. CONTINUE 1 TABLET DAILY THEREAFTER    Probiotic Product (PROBIOTIC-10 OR) 1 capsule, Oral, Daily    Psyllium 0.52  g Oral Cap Not using at this time    Repaglinide 2 mg, Oral, 3 times daily before meals    rifAXIMin (XIFAXAN) 550 mg, Oral, 2 times daily    tamsulosin (FLOMAX) 0.4 mg, Oral, Daily, Take 1/2 hour following the same meal each day    zinc sulfate (ZINCATE) 220 mg, Oral, Daily        FAMILY HISTORY:  Family History   Problem Relation Age of Onset    Cancer Father         Melanoma    Other (Other) Mother         strokes    Cancer Mother         Colon Cancer    Dementia Mother         NOT Alzheimers, but cognitive impairment    Hypertension Mother         Hydrochlorothiazide    Other (Other) Daughter         ASTHMA    Other (Other) Son         ASTHMA    Anemia Brother         Uses Ferrous Sulphate 325mg    Asthma Brother     Cancer Brother         Colon Cancer & Squamus Cell skin cancer    Asthma Son     Obesity Son     Psychiatric Son         ADHD    Asthma Son     Asthma Daughter     Obesity Daughter     Cancer Sister         Melanoma        SOCIAL HISTORY:  Social History     Socioeconomic History    Marital status:    Tobacco Use    Smoking status: Former     Packs/day: 2.00     Years: 25.00     Additional pack years: 0.00     Total pack years: 50.00     Types: Cigarettes     Quit date: 2014     Years since quittin.9    Smokeless tobacco: Never    Tobacco comments:     16-26, light smoker, quit 26-43, medium smoker 43-58, 59+ quit smoking   Vaping Use    Vaping Use: Never used   Substance and Sexual Activity    Alcohol use: Not Currently     Comment: very light social drinker, 1-2 wine or 1-2 beer, none 2016+    Drug use: No          PHYSICAL EXAMINATION:  There were no vitals filed for this visit.  CONSTITUTIONAL: No apparent distress, cooperative and communicative  NEUROLOGIC: Alert and oriented   HEAD: Normocephalic, atraumatic   EYES: Sclera non-icteric   ENT: Hearing intact, moist mucous membranes   NECK: No obvious goiter or masses   RESPIRATORY: Normal respiratory effort, Nonlabored breathing  on room air  SKIN: No evident rashes   ABDOMEN: Soft, nontender, nondistended, no rebound tenderness, no guarding, no masses      REVIEW OF SYSTEMS:    A comprehensive 10-point review of systems was completed.  Pertinent positives and negatives are noted in the the HPI.       LABORATORY DATA:  Urine Color  Yellow Yellow   Clarity Urine  Clear Ex.Turbid Abnormal    Spec Gravity  1.005 - 1.030 1.006   Glucose Urine  Normal mg/dL Normal   Bilirubin Urine  Negative Negative   Ketones Urine  Negative mg/dL Negative   Blood Urine  Negative 1+ Abnormal    pH Urine  5.0 - 8.0 5.5   Protein Urine  Negative mg/dL 20 Abnormal    Urobilinogen Urine  Normal Normal   Nitrite Urine  Negative Negative   Leukocyte Esterase Urine  Negative 500 Abnormal    Comment:  Oanh/uL Interpretation  25          Trace  75          1+  250         2+  500         3+   WBC Urine  0 - 5 /HPF >50 Abnormal    RBC Urine  0 - 2 /HPF >10 Abnormal    Bacteria Urine  None Seen /HPF None Seen   Squamous Epi. Cells  None Seen /HPF None Seen   Renal Tubular Epithelial Cells  None Seen /HPF None Seen     URINE CULTURE >100,000 CFU/ML Enterococcus faecium VRE Abnormal               IMAGING REVIEW:  Narrative   PROCEDURE: US PARACENTESIS W IMAGING (CPT=49083)     INDICATIONS: Cirrhosis of liver with ascites (HCC)     COMPARISON: [facility], US PARACENTESIS W IMAGING (CPT=49083), 8/28/2023, 10:11 AM.     ANESTHESIA/SEDATION:  Level of anesthesia/sedation: None (Local anesthesia only)  Anesthesia/sedation administered by: Not applicable     ESTIMATED BLOOD LOSS: Less than 1 mL     COMPLICATIONS: None     FINDINGS:     Informed consent was obtained. The abdomen was sterilely prepped and draped. Local Lidocaine was administered. Under ultrasound guidance, a 5 Slovenian Yueh needle was advanced into ascites in the right lower quadrant. The catheter was attached to suction,  and a total of 2296 mL of straw colored fluid was removed. The catheter was then  removed.               Impression   CONCLUSION: Paracentesis as described.           Dictated by (CST): Ezequiel Harris APN on 3/01/2024 at 4:24 PM      Finalized by (CST): Ezequiel Harris APN on 3/01/2024 at 4:25 PM              OTHER RELEVANT DATA:   none     IMPRESSION: LUTS in patient with elevated PVR currently on tamsulosin 0.4mg at bedtime. Will check UA reflex to culture today. Offered increase to tamsulosin 0.8mg at bedtime. Offered cystoscopy and TRUS. Will move forward with tamsulosin 0.8mg at bedtime and if no improvement consider diagnostic testing.    He has a lot of medical procedures and issues that he is dealing with currently and wants to sort those out first.     PLAN:  Tamsulosin 0.8mg at bedtime  Behavioral management  RTC 3 months    Thank you for referring this very pleasant patient to my clinic. If you have any questions or concerns, please do not hesitate to contact me.    Sincerely,  Taniya Coombs MD    30 minutes were spent on this patient at this visit obtaining a history, reviewing medical records, developing a treatment plan, counseling and discussing treatment strategy with patient, coordination of care and documentation.     The 21st Century Cures Act makes medical notes available to patients in the interest of transparency.  However, please be advised that this is a medical document.  It is intended as a peer to peer communication.  It is written in medical language and may contain abbreviations or verbiage that are technical and unfamiliar.  It may appear blunt or direct.  Medical documents are intended to carry relevant information, facts as evident, and the clinical opinion of the practitioner.

## 2024-04-30 ENCOUNTER — TELEPHONE (OUTPATIENT)
Dept: INTERNAL MEDICINE CLINIC | Facility: CLINIC | Age: 69
End: 2024-04-30

## 2024-05-06 DIAGNOSIS — F90.0 ATTENTION DEFICIT HYPERACTIVITY DISORDER (ADHD), PREDOMINANTLY INATTENTIVE TYPE: ICD-10-CM

## 2024-05-06 RX ORDER — DEXTROAMPHETAMINE SACCHARATE, AMPHETAMINE ASPARTATE MONOHYDRATE, DEXTROAMPHETAMINE SULFATE AND AMPHETAMINE SULFATE 6.25; 6.25; 6.25; 6.25 MG/1; MG/1; MG/1; MG/1
25 CAPSULE, EXTENDED RELEASE ORAL EVERY MORNING
Qty: 30 CAPSULE | Refills: 0 | Status: CANCELLED | OUTPATIENT
Start: 2024-05-29

## 2024-05-06 RX ORDER — PANTOPRAZOLE SODIUM 40 MG/1
40 TABLET, DELAYED RELEASE ORAL
Qty: 90 TABLET | Refills: 3 | Status: SHIPPED | OUTPATIENT
Start: 2024-05-06

## 2024-05-06 NOTE — TELEPHONE ENCOUNTER
Patients spouse called on his behalf stating that he has been hospitalized. She is requesting refills on medication that he was given from the hospitality. She is requesting refill for the following medication:    Pantoprazole 40MG    She is also requesting a refill on the following medication that Kaylee Johnson has been prescribing for him:    Amphetamine-Dextroamphet ER (ADDERALL XR) 25 MG Oral Capsule SR 24 Hr     Per spouse if [facility] is out of stock please send the refill for the Adderall to the following pharmacy:    [address] [place] [address]

## 2024-05-06 NOTE — TELEPHONE ENCOUNTER
Protocol Failed; No protocol   Recent fills: Quantity: 30  02/26/2023, 03/30/2024, 04/28/2024                                                                    Patient is due: 05/29/2024  Last Rx written: 02/26/2024  Last Office Visit: 03/25/2024

## 2024-05-07 ENCOUNTER — TELEPHONE (OUTPATIENT)
Dept: INTERNAL MEDICINE CLINIC | Facility: CLINIC | Age: 69
End: 2024-05-07

## 2024-05-08 ENCOUNTER — APPOINTMENT (OUTPATIENT)
Dept: URBAN - METROPOLITAN AREA CLINIC 244 | Age: 69
Setting detail: DERMATOLOGY
End: 2024-05-09

## 2024-05-08 ENCOUNTER — TELEPHONE (OUTPATIENT)
Dept: SURGERY | Facility: CLINIC | Age: 69
End: 2024-05-08

## 2024-05-08 DIAGNOSIS — D22 MELANOCYTIC NEVI: ICD-10-CM

## 2024-05-08 PROBLEM — D22.5 MELANOCYTIC NEVI OF TRUNK: Status: ACTIVE | Noted: 2024-05-08

## 2024-05-08 PROCEDURE — OTHER DEFER: OTHER

## 2024-05-08 PROCEDURE — OTHER CONSULTATION EXCISION: OTHER

## 2024-05-08 PROCEDURE — 99213 OFFICE O/P EST LOW 20 MIN: CPT

## 2024-05-08 PROCEDURE — OTHER COUNSELING: OTHER

## 2024-05-08 ASSESSMENT — LOCATION SIMPLE DESCRIPTION DERM: LOCATION SIMPLE: ABDOMEN

## 2024-05-08 ASSESSMENT — LOCATION DETAILED DESCRIPTION DERM: LOCATION DETAILED: LEFT LATERAL ABDOMEN

## 2024-05-08 ASSESSMENT — LOCATION ZONE DERM: LOCATION ZONE: TRUNK

## 2024-05-08 NOTE — TELEPHONE ENCOUNTER
Per wife patient had a catheter inserted at [facility] on 5/4 and now has blood in his urine. Please advise

## 2024-05-08 NOTE — PROCEDURE: DEFER
Size Of Lesion In Cm (Optional): 0
Procedure To Be Performed At Next Visit: Excision
Introduction Text (Please End With A Colon): The following procedure was deferred: 30 min
Detail Level: Detailed

## 2024-05-08 NOTE — TELEPHONE ENCOUNTER
Pt's wife called.  Pt's pain is worse and pale.  Should pt go to the emergency room. Call transferred to the nurse.

## 2024-05-08 NOTE — TELEPHONE ENCOUNTER
I s/w pt and he informed that he was just discharged from the hospital with a cole cath in place. He states that they gave him some supplies and a big bag to use at night and does not know how to use it. I gave a detailed explanation on how to change over to the night bag and also told him that he should be keeping his bowels soft and moving daily and also taking Tamsulosin daily. I explained that I will ask AR what the next step is and get back to him with a response. Please advise.         LOV 4/15/24  PLAN:  Tamsulosin 0.8mg at bedtime  Behavioral management  RTC 3 months     Thank you for referring this very pleasant patient to my clinic. If you have any questions or concerns, please do not hesitate to contact me.     Sincerely,  Taniya Coombs MD

## 2024-05-08 NOTE — TELEPHONE ENCOUNTER
-Call-Center transferred pt's wife to Urology; pt identity verified with name & .  -Wife reports \"we just got home from  & are still in the car.  Benji looks pale.  Either I bring him to your office or to the ER.\"  -Wife instructed to call \"[phone].\"  -She states pt has \"liver issues\" & is followed by [facility] & planned to take him to [facility] \"which is 10 minutes from our house.  -Outcome pending.

## 2024-05-09 NOTE — TELEPHONE ENCOUNTER
-S/w pt & wife for condition update; identity verified with name & .  -Pt admitted to [facility] from ER 24.  -For suspected UTI, cole replaced & IV ABX administered.  -In addition, HGB 6.7 & 1UPRBCs given.  -They report the \"Liver specialist recommended\" all pt's physicians should be in one hospital system.  Therefore, he has Consult on 24 with Dr. Jones/ Urologist @ [facility].  -They requested I cancel 5/15 & 24 OV w/ AR @ this time; \"we can always RS if we prefer AR.\"  -Encounter complete.

## 2024-05-15 DIAGNOSIS — F90.0 ATTENTION DEFICIT HYPERACTIVITY DISORDER (ADHD), PREDOMINANTLY INATTENTIVE TYPE: ICD-10-CM

## 2024-05-15 RX ORDER — DEXTROAMPHETAMINE SACCHARATE, AMPHETAMINE ASPARTATE MONOHYDRATE, DEXTROAMPHETAMINE SULFATE AND AMPHETAMINE SULFATE 6.25; 6.25; 6.25; 6.25 MG/1; MG/1; MG/1; MG/1
25 CAPSULE, EXTENDED RELEASE ORAL EVERY MORNING
Qty: 30 CAPSULE | Refills: 0 | Status: SHIPPED | OUTPATIENT
Start: 2024-05-15

## 2024-05-15 NOTE — TELEPHONE ENCOUNTER
[See the refill encounter on 5/6/24]    Minnie/wife (Last signed Verbal Release verified) called states she had requested refill of Amphetamine-Dextroamphet ER (ADDERALL XR) 25 MG Oral Capsule SR 24 Hr --> she states she called [facility] and they did not receive prescription. Patient was recently in the hospital where he was receiving the Adderall. She states patient needs his medication. I can see it was denied, no reason for denial was stated. He is now out of the medication, last dose was yesterday. I made her aware it can take up to 48 business hours to process the request. She asked that it be a high priority. Patient verbalized understanding. No further questions or concerns at this time.    Routing for protocol

## 2024-05-15 NOTE — TELEPHONE ENCOUNTER
Called [place] [facility] and spoke with Myranda, pharmacy tech. She verified that patient just picked up a 30 day supply on 4/28/2024.     Patient is not due until 05/28/24.

## 2024-05-15 NOTE — TELEPHONE ENCOUNTER
Routing to [employer]mates due to High Priority status and Kaylee Johnson, nurse practitioner is out of office.    Please review. Rx failed/no protocol.    Future Appointments   Date Time Provider Department Center   5/18/2024  9:00 AM Kaylee Johnson APRN [id number] [place]     Please advise:  Per Elin GUILLEN, registered nurse on 05/15/2024:  Per patient's spouse, patient was hospitalized and while there the inpatient pharmacy used his supply of Adderall XR 25 mg. The patient received the bottle back, but the patient unfortunately lost it. Wife is asking if he could get more as he is need of it for his wellbeing. He has a hospital follow up Saturday. Thank you.    Recent fills (qty #30 ea for a 30 day supply): 04/28/24, 03/30/24, and 02/26/24  Last prescription written on: 02/26/24  Last office visit: 03/25/2024 with Dr. Starks  Requested Prescriptions   Pending Prescriptions Disp Refills    Amphetamine-Dextroamphet ER (ADDERALL XR) 25 MG Oral Capsule SR 24 Hr 30 capsule 0     Sig: Take 1 capsule (25 mg total) by mouth every morning.       Controlled Substance Medication Failed - 5/15/2024 11:12 AM        Failed - This medication is a controlled substance - forward to provider to refill             Future Appointments         Provider Department Appt Notes    In 3 days Kaylee Johnson APRN [facility], [address], [place] hfu visit    In 2 weeks Malachi Valverde MD [facility] Subacute cough and Pleural effusion (policy informed)          Recent Outpatient Visits              1 month ago Lower urinary tract symptoms    [facility] Taniya Coombs MD    Office Visit    1 month ago Type 2 diabetes mellitus without complication, without long-term current use of insulin ([facility])    [facility] [address], [place] Peace Starks MD    Office Visit    2 months ago Platelets decreased (HCC)    [place]  [facility] [address], [place] Kaylee Johnson APRN    Office Visit    2 months ago Subacute cough    [facility] [address], [place] Kaylee Johnson APRN    Office Visit    3 months ago Urinary retention [R33.9]    [facility] Shania Jacinto, PA[facility]onC    Office Visit

## 2024-05-18 ENCOUNTER — OFFICE VISIT (OUTPATIENT)
Dept: INTERNAL MEDICINE CLINIC | Facility: CLINIC | Age: 69
End: 2024-05-18

## 2024-05-18 VITALS
DIASTOLIC BLOOD PRESSURE: 67 MMHG | WEIGHT: 268 LBS | HEIGHT: 65 IN | SYSTOLIC BLOOD PRESSURE: 116 MMHG | HEART RATE: 91 BPM | BODY MASS INDEX: 44.65 KG/M2

## 2024-05-18 DIAGNOSIS — D50.8 OTHER IRON DEFICIENCY ANEMIA: ICD-10-CM

## 2024-05-18 DIAGNOSIS — K74.60 LIVER CIRRHOSIS SECONDARY TO NASH (HCC): Primary | ICD-10-CM

## 2024-05-18 DIAGNOSIS — F90.0 ATTENTION DEFICIT HYPERACTIVITY DISORDER (ADHD), PREDOMINANTLY INATTENTIVE TYPE: ICD-10-CM

## 2024-05-18 DIAGNOSIS — K75.81 LIVER CIRRHOSIS SECONDARY TO NASH (HCC): Primary | ICD-10-CM

## 2024-05-18 PROCEDURE — 3078F DIAST BP <80 MM HG: CPT | Performed by: NURSE PRACTITIONER

## 2024-05-18 PROCEDURE — 1126F AMNT PAIN NOTED NONE PRSNT: CPT | Performed by: NURSE PRACTITIONER

## 2024-05-18 PROCEDURE — 99214 OFFICE O/P EST MOD 30 MIN: CPT | Performed by: NURSE PRACTITIONER

## 2024-05-18 PROCEDURE — 3008F BODY MASS INDEX DOCD: CPT | Performed by: NURSE PRACTITIONER

## 2024-05-18 PROCEDURE — 3074F SYST BP LT 130 MM HG: CPT | Performed by: NURSE PRACTITIONER

## 2024-05-18 PROCEDURE — 1111F DSCHRG MED/CURRENT MED MERGE: CPT | Performed by: NURSE PRACTITIONER

## 2024-05-18 RX ORDER — DEXTROAMPHETAMINE SACCHARATE, AMPHETAMINE ASPARTATE MONOHYDRATE, DEXTROAMPHETAMINE SULFATE AND AMPHETAMINE SULFATE 6.25; 6.25; 6.25; 6.25 MG/1; MG/1; MG/1; MG/1
25 CAPSULE, EXTENDED RELEASE ORAL EVERY MORNING
Qty: 30 CAPSULE | Refills: 0 | Status: SHIPPED | OUTPATIENT
Start: 2024-05-18

## 2024-05-18 RX ORDER — CEFPODOXIME PROXETIL 200 MG/1
200 TABLET, FILM COATED ORAL 2 TIMES DAILY
COMMUNITY
Start: 2024-05-11 | End: 2024-05-21

## 2024-05-18 NOTE — PROGRESS NOTES
Joe Haney is a 69 year old male.  HPI:   Pt presents for follow up, requests refill on Adderall as his Rx bottle was not returned to him when he was at the hospital at [facility] and is having difficulty obtaining new Rx.   -Per review of oncologist notes from [facility] (William Sanchez) Pt reports he is due for colectomy at [facility] with Dr Oswald due to colonic mass in the splenic flexure, at least high grade dysplasia. \"ESR aborted 4/2/24 due to non-lifting sign in the central area of the polyp along with an amorphous polyp vascular pattern endoscopic with concern for invasive malignancy. To meet with Dr Oswald on 5/23/24. Will need to manage surgcial \"  -Pancytopenia: per oncologist \"baseline pancytopenia results from his cirrhosis with hepatosplenomegaly causing splenic sequestration \"  Anemia/NO: \"- received IV iron, 200 mg IV x 5 doses  - EGD with varices that were banded, s/p TIPS procedure  - colonoscopy with splenic flexure mass with at least high grade dysplasia that could also be a source of blood loss  - will check iron stores and CBC weekly.  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      Amphetamine-Dextroamphet ER (ADDERALL XR) 25 MG Oral Capsule SR 24 Hr Take 1 capsule (25 mg total) by mouth every morning. 30 capsule 0    Amphetamine-Dextroamphet ER (ADDERALL XR) 25 MG Oral Capsule SR 24 Hr Take 1 capsule (25 mg total) by mouth every morning. Please refill medication patient lost medication being at the hospital 30 capsule 0    pantoprazole 40 MG Oral Tab EC Take 1 tablet (40 mg total) by mouth before breakfast. 90 tablet 3    tamsulosin 0.4 MG Oral Cap Take  2 capsules (0.8 mg total) by mouth daily. Take prior to bedtime 180 capsule 3    zinc sulfate 220 (50 Zn) MG Oral Cap Take 1 capsule (220 mg total) by mouth daily.      benzonatate 200 MG Oral Cap Take 1 capsule (200 mg total) by mouth 3 (three) times daily as needed for cough. 30 capsule 0    Ferrous Sulfate 325 (65 Fe) MG Oral Tab Take 1 tablet (325 mg total) by mouth daily.      lactulose 10 GM/15ML Oral Solution Take 30 mL (20 g total) by mouth as needed.      Potassium Chloride ER 20 MEQ Oral Tab CR TAKE 1 TABLET BY MOUTH TWICE DAILY FOR 2 DAYS. CONTINUE 1 TABLET DAILY THEREAFTER (Patient taking differently: Take 10 mEq by mouth in the morning and 10 mEq before bedtime. TAKE 1 TABLET BY MOUTH TWICE DAILY FOR 2 DAYS. CONTINUE 1 TABLET DAILY THEREAFTER.) 90 tablet 0    escitalopram 10 MG Oral Tab Take 1 tablet (10 mg total) by mouth daily. 90 tablet 3    Repaglinide 1 MG Oral Tab Take 2 tablets (2 mg total) by mouth 3 (three) times daily before meals.      bumetanide 2 MG Oral Tab Take 0.5 tablets (1 mg total) by mouth 2 (two) times daily.      omega-3 fatty acids 1000 MG Oral Cap Take 1,000 mg by mouth at bedtime.      Multiple Vitamin (MULTIVITAMIN ADULT OR) Take 1 tablet by mouth daily.      Probiotic Product (PROBIOTIC-10 OR) Take 1 capsule by mouth daily.      fluticasone propionate 50 MCG/ACT Nasal Suspension 2 sprays by Each Nare route daily. 1 each 1    Continuous Blood Gluc Sensor (FREESTYLE GOLD 3 SENSOR) Does not apply Misc 1 each daily. 1 each 11    rifaximin (XIFAXAN) 550 MG Oral Tab Take 1 tablet (550 mg total) by mouth 2 (two) times daily. 60 tablet 11    aMILoride 5 MG Oral Tab Take 2 tablets (10 mg total) by mouth 2 (two) times a day. The patient is to take 20mg per day (Patient taking differently: Take 2 tablets (10 mg total) by mouth in the morning and 2 tablets (10 mg total) before bedtime.) 120 tablet 3    Psyllium 0.52 g Oral Cap Not using at this time      hydrocortisone 2.5 % External  Cream as needed.      Clobetasol Propionate 0.05 % External Liquid Apply 125 mL topically As Directed. shampoo        Past Medical History:    Allergic rhinitis    Ragweed Pollen    Anxiety    Learning / catching up on age related medical issues.    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)    HE, Non bleeding varices    Neuropathy    hands and feet    Obesity    Osteoarthritis    Psoriasis    Sarcoidosis of lung (HCC)    Sleep apnea    Visual impairment    EYEGLASSES      Social History:  Social History     Socioeconomic History    Marital status:    Tobacco Use    Smoking status: Former     Current packs/day: 0.00     Average packs/day: 2.0 packs/day for 25.0 years (50.0 ttl pk-yrs)     Types: Cigarettes     Start date: 4/7/1989     Quit date: 4/7/2014     Years since quitting: 10.1    Smokeless tobacco: Never    Tobacco comments:     [age], light smoker, quit [age], medium smoker [age], 59+ quit smoking   Vaping Use    Vaping status: Never Used   Substance and Sexual Activity    Alcohol use: Not Currently     Comment: very light social drinker, 1-2 wine or 1-2 beer, none 2016+    Drug use: No     Social Determinants of Health     Financial Resource Strain: Patient Declined (7/22/2023)    Received from [facility]    Overall Financial Resource Strain (CARDIA)     Difficulty of Paying Living Expenses: Patient declined   Food Insecurity: Low Risk  (5/8/2024)    Received from [facility]    Food Insecurity     Have there been times that your food ran out, and you didn't have money to get more?: No     Are there times that you worry that this might happen?: No   Transportation Needs: Low Risk  (5/8/2024)    Received from [facility]     Transportation Needs     Do you have trouble getting transportation to medical appointments?: No   Physical Activity: Patient Declined (7/22/2023)    Received from [facility]    Exercise Vital Sign     Days of Exercise per Week: Patient declined     Minutes of Exercise per Session: Patient declined   Stress: Patient Declined (7/22/2023)    Received from [facility]    [employer] [place] [employer] - Occupational Stress Questionnaire     Feeling of Stress : Patient declined   Social Connections: Patient Declined (7/22/2023)    Received from [facility]    Social Connection and Isolation Panel [NHANES]     Frequency of Communication with Friends and Family: Patient declined     Frequency of Social Gatherings with Friends and Family: Patient declined     Attends Mosque Services: Patient declined     Active Member of Clubs or Organizations: Patient declined     Attends Club or Organization Meetings: Patient declined     Marital Status: Patient declined   Housing Stability: Low Risk  (5/8/2024)    Received from [facility]    Housing Stability     Are you worried that your electric, gas, oil, or water might be shut off?: No     Are you concerned about having a safe and reliable place to live?: No        REVIEW OF SYSTEMS:   Review of Systems   All other systems reviewed and are negative.         EXAM:   /67 (BP Location: Left arm, Patient Position: Sitting, Cuff Size: large)   Pulse 91   Ht 5' 5\" (1.651 m)   Wt 268 lb (121.6 kg)   BMI 44.60 kg/m²     Physical Exam  Vitals reviewed.   Constitutional:       General: He is not in acute distress.  HENT:      Head: Normocephalic.   Eyes:      General: No scleral icterus.     Conjunctiva/sclera: Conjunctivae normal.      Pupils: Pupils are equal, round, and reactive to light.   Cardiovascular:      Rate and Rhythm: Normal rate and regular rhythm.      Pulses: Normal pulses.      Heart sounds: Normal heart sounds.    Pulmonary:      Effort: Pulmonary effort is normal. No respiratory distress.      Breath sounds: Normal breath sounds.   Skin:     General: Skin is warm.      Coloration: Skin is not jaundiced.   Neurological:      Mental Status: He is alert and oriented to person, place, and time.   Psychiatric:         Mood and Affect: Mood normal.         Thought Content: Thought content normal.         Judgment: Judgment normal.            ASSESSMENT AND PLAN:   1. 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 (Dr Guzmán)  -Continue lactulose  -Monitor abdomen size/sx, schedule paracentesis as needed.     2. Other iron deficiency anemia  -Pt following with hematology (Dr Thomas, [facility]). Getting weekly lab draws. Next appt 6/2024.   -Per hematology notes (Dr Thomas 4/26/2024, pancytopenia likely from cirrhosis with hepatosplenomegaly causing splnic sequestration. Anemia is due to blood loss, receiving IV iron infusions, hx of EGD with varices, s/p TIPS. He had a recent colonoscopy with a mass noted in splenic flexure with at least high grade dysplasia that could be another source of blood loss. He is awaiting to receive date for repeat colonoscopy with GI at [facility]. .     3. Attention deficit hyperactivity disorder (ADHD), predominantly inattentive type  -Refilled Adderall.   - Amphetamine-Dextroamphet ER (ADDERALL XR) 25 MG Oral Capsule SR 24 Hr; Take 1 capsule (25 mg total) by mouth every morning.  Dispense: 30 capsule; Refill: 0       The patient indicates understanding of these issues and agrees to the plan.  The patient is asked to return in 4-6 weeks/PRN.     The above note was creating using Dragon speech recognition technology. Please excuse any typos.

## 2024-05-28 ENCOUNTER — HOSPITAL ENCOUNTER (OUTPATIENT)
Dept: ULTRASOUND IMAGING | Facility: HOSPITAL | Age: 69
Discharge: HOME | End: 2024-05-28
Attending: INTERNAL MEDICINE

## 2024-06-07 ENCOUNTER — TELEPHONE (OUTPATIENT)
Dept: INTERNAL MEDICINE CLINIC | Facility: CLINIC | Age: 69
End: 2024-06-07

## 2024-06-07 NOTE — TELEPHONE ENCOUNTER
Patient's spouse is calling to advise the patient is scheduled for two surgeries    7/1/24     Dr. Live Jones, Urology  [facility]  Phone # [phone]  Prostate surgery    7/3/24  Dr. Navin Oswald, Gastro  [facility]  Phone # [phone]  Colorectal surgery    Please advise

## 2024-06-11 DIAGNOSIS — F90.0 ATTENTION DEFICIT HYPERACTIVITY DISORDER (ADHD), PREDOMINANTLY INATTENTIVE TYPE: ICD-10-CM

## 2024-06-14 NOTE — TELEPHONE ENCOUNTER
Please review. Protocol Failed; No Protocol      Recent fills: 2/26/2024, 3/30/2024, 4/28/2024  Last Rx written: 2/26/2024  Last office visit: 5/18/2024      Future Appointments  Date Type Provider Dept   06/21/24 Appointment Peace Starks MD [facility]   Showing future appointments within next 150 days with a meds authorizing provider and meeting all other requirements        Requested Prescriptions   Pending Prescriptions Disp Refills    Amphetamine-Dextroamphet ER (ADDERALL XR) 25 MG Oral Capsule SR 24 Hr 30 capsule 0     Sig: Take 1 capsule (25 mg total) by mouth every morning.       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 ([facility])    [facility], [address], [place] Kaylee Johnson APRN    Office Visit    2 months ago [facility]    Office Visit    2 months ago Type 2 diabetes mellitus without complication, without long-term current use of insulin ([facility])    [facility] [address], [place] Peace Starks MD    Office Visit    3 months ago Platelets decreased (HCC)    [facility] [address], [place] Kaylee Johnson APRN    Office Visit    3 months ago Subacute cough    [facility], [address], [place] Kaylee Johnson, LIBBY    Office Visit

## 2024-06-15 RX ORDER — DEXTROAMPHETAMINE SACCHARATE, AMPHETAMINE ASPARTATE MONOHYDRATE, DEXTROAMPHETAMINE SULFATE AND AMPHETAMINE SULFATE 6.25; 6.25; 6.25; 6.25 MG/1; MG/1; MG/1; MG/1
25 CAPSULE, EXTENDED RELEASE ORAL EVERY MORNING
Qty: 30 CAPSULE | Refills: 0 | Status: SHIPPED | OUTPATIENT
Start: 2024-06-15

## 2024-06-21 ENCOUNTER — LAB ENCOUNTER (OUTPATIENT)
Dept: LAB | Age: 69
End: 2024-06-21
Attending: INTERNAL MEDICINE

## 2024-06-21 ENCOUNTER — OFFICE VISIT (OUTPATIENT)
Dept: INTERNAL MEDICINE CLINIC | Facility: CLINIC | Age: 69
End: 2024-06-21

## 2024-06-21 VITALS
BODY MASS INDEX: 39.49 KG/M2 | HEIGHT: 65 IN | WEIGHT: 237 LBS | HEART RATE: 87 BPM | DIASTOLIC BLOOD PRESSURE: 67 MMHG | SYSTOLIC BLOOD PRESSURE: 108 MMHG

## 2024-06-21 DIAGNOSIS — D12.6 TUBULAR ADENOMA OF COLON: ICD-10-CM

## 2024-06-21 DIAGNOSIS — K74.60 LIVER CIRRHOSIS SECONDARY TO NASH (HCC): Primary | ICD-10-CM

## 2024-06-21 DIAGNOSIS — N13.8 BPH W URINARY OBS/LUTS: ICD-10-CM

## 2024-06-21 DIAGNOSIS — E11.9 TYPE 2 DIABETES MELLITUS WITHOUT COMPLICATION, WITHOUT LONG-TERM CURRENT USE OF INSULIN (HCC): ICD-10-CM

## 2024-06-21 DIAGNOSIS — Z95.828 S/P TIPS (TRANSJUGULAR INTRAHEPATIC PORTOSYSTEMIC SHUNT): ICD-10-CM

## 2024-06-21 DIAGNOSIS — K75.81 LIVER CIRRHOSIS SECONDARY TO NASH (HCC): Primary | ICD-10-CM

## 2024-06-21 DIAGNOSIS — R18.8 CIRRHOSIS OF LIVER WITH ASCITES, UNSPECIFIED HEPATIC CIRRHOSIS TYPE (HCC): ICD-10-CM

## 2024-06-21 DIAGNOSIS — N40.1 BPH W URINARY OBS/LUTS: ICD-10-CM

## 2024-06-21 DIAGNOSIS — D61.818 PANCYTOPENIA (HCC): ICD-10-CM

## 2024-06-21 DIAGNOSIS — K76.6 PORTAL HYPERTENSION (HCC): ICD-10-CM

## 2024-06-21 DIAGNOSIS — K76.82 HEPATIC ENCEPHALOPATHY (HCC): ICD-10-CM

## 2024-06-21 DIAGNOSIS — K74.60 CIRRHOSIS OF LIVER WITH ASCITES, UNSPECIFIED HEPATIC CIRRHOSIS TYPE (HCC): ICD-10-CM

## 2024-06-21 LAB
ANION GAP SERPL CALC-SCNC: 2 MMOL/L (ref 0–18)
BASOPHILS # BLD AUTO: 0.02 X10(3) UL (ref 0–0.2)
BASOPHILS NFR BLD AUTO: 0.7 %
BUN BLD-MCNC: 19 MG/DL (ref 9–23)
BUN/CREAT SERPL: 16.4 (ref 10–20)
CALCIUM BLD-MCNC: 8.9 MG/DL (ref 8.7–10.4)
CHLORIDE SERPL-SCNC: 108 MMOL/L (ref 98–112)
CO2 SERPL-SCNC: 29 MMOL/L (ref 21–32)
CREAT BLD-MCNC: 1.16 MG/DL
DEPRECATED RDW RBC AUTO: 53.5 FL (ref 35.1–46.3)
EGFRCR SERPLBLD CKD-EPI 2021: 68 ML/MIN/1.73M2 (ref 60–?)
EOSINOPHIL # BLD AUTO: 0.22 X10(3) UL (ref 0–0.7)
EOSINOPHIL NFR BLD AUTO: 7.3 %
ERYTHROCYTE [DISTWIDTH] IN BLOOD BY AUTOMATED COUNT: 15.7 % (ref 11–15)
EST. AVERAGE GLUCOSE BLD GHB EST-MCNC: 117 MG/DL (ref 68–126)
FASTING STATUS PATIENT QL REPORTED: NO
GLUCOSE BLD-MCNC: 93 MG/DL (ref 70–99)
HBA1C MFR BLD: 5.7 % (ref ?–5.7)
HCT VFR BLD AUTO: 25.8 %
HGB BLD-MCNC: 7.9 G/DL
IMM GRANULOCYTES # BLD AUTO: 0.01 X10(3) UL (ref 0–1)
IMM GRANULOCYTES NFR BLD: 0.3 %
INR BLD: 1.2 (ref 0.8–1.2)
LYMPHOCYTES # BLD AUTO: 0.36 X10(3) UL (ref 1–4)
LYMPHOCYTES NFR BLD AUTO: 12 %
MCH RBC QN AUTO: 28.8 PG (ref 26–34)
MCHC RBC AUTO-ENTMCNC: 30.6 G/DL (ref 31–37)
MCV RBC AUTO: 94.2 FL
MONOCYTES # BLD AUTO: 0.49 X10(3) UL (ref 0.1–1)
MONOCYTES NFR BLD AUTO: 16.3 %
NEUTROPHILS # BLD AUTO: 1.91 X10 (3) UL (ref 1.5–7.7)
NEUTROPHILS # BLD AUTO: 1.91 X10(3) UL (ref 1.5–7.7)
NEUTROPHILS NFR BLD AUTO: 63.4 %
OSMOLALITY SERPL CALC.SUM OF ELEC: 290 MOSM/KG (ref 275–295)
PLATELET # BLD AUTO: 81 10(3)UL (ref 150–450)
PLATELETS.RETICULATED NFR BLD AUTO: 5 % (ref 0–7)
POTASSIUM SERPL-SCNC: 4.2 MMOL/L (ref 3.5–5.1)
PROTHROMBIN TIME: 15.9 SECONDS (ref 11.6–14.8)
RBC # BLD AUTO: 2.74 X10(6)UL
SODIUM SERPL-SCNC: 139 MMOL/L (ref 136–145)
WBC # BLD AUTO: 3 X10(3) UL (ref 4–11)

## 2024-06-21 PROCEDURE — 85025 COMPLETE CBC W/AUTO DIFF WBC: CPT

## 2024-06-21 PROCEDURE — 85610 PROTHROMBIN TIME: CPT

## 2024-06-21 PROCEDURE — 3078F DIAST BP <80 MM HG: CPT | Performed by: INTERNAL MEDICINE

## 2024-06-21 PROCEDURE — 80048 BASIC METABOLIC PNL TOTAL CA: CPT

## 2024-06-21 PROCEDURE — 83036 HEMOGLOBIN GLYCOSYLATED A1C: CPT

## 2024-06-21 PROCEDURE — 3008F BODY MASS INDEX DOCD: CPT | Performed by: INTERNAL MEDICINE

## 2024-06-21 PROCEDURE — 99214 OFFICE O/P EST MOD 30 MIN: CPT | Performed by: INTERNAL MEDICINE

## 2024-06-21 PROCEDURE — 1126F AMNT PAIN NOTED NONE PRSNT: CPT | Performed by: INTERNAL MEDICINE

## 2024-06-21 PROCEDURE — 3074F SYST BP LT 130 MM HG: CPT | Performed by: INTERNAL MEDICINE

## 2024-06-21 PROCEDURE — 3044F HG A1C LEVEL LT 7.0%: CPT | Performed by: INTERNAL MEDICINE

## 2024-06-21 PROCEDURE — 36415 COLL VENOUS BLD VENIPUNCTURE: CPT

## 2024-06-21 RX ORDER — CARVEDILOL 3.12 MG/1
3.12 TABLET ORAL 2 TIMES DAILY WITH MEALS
COMMUNITY
Start: 2024-04-09

## 2024-06-21 RX ORDER — ASPIRIN 81 MG/1
81 TABLET ORAL DAILY
COMMUNITY
Start: 2024-04-10

## 2024-06-21 RX ORDER — SODIUM LACTATE/LA/MIN OIL/WATR
1 CREAM (ML) TOPICAL
COMMUNITY

## 2024-06-21 RX ORDER — ATORVASTATIN CALCIUM 10 MG/1
10 TABLET, FILM COATED ORAL NIGHTLY
COMMUNITY
Start: 2024-04-09

## 2024-06-22 ENCOUNTER — TELEPHONE (OUTPATIENT)
Dept: INTERNAL MEDICINE CLINIC | Facility: CLINIC | Age: 69
End: 2024-06-22

## 2024-06-22 NOTE — TELEPHONE ENCOUNTER
Patient paged the answering service to give patient a call back. Aware that Dr Starks not in the office.        Name:SATURDAY TRIAGE RN                      2024 9:38:39 AM  ProfileId:    [id number]                   [employer]  Department:[place] ANSWERING SERVICE  ======================================================================  Text Messages    Message # [phone]         2024 09:37a   [[email or web address]]  To:  SATURDAY TRIAGE RN  From:  JOSELUIS HANEY-TORY 55  Primary MD:  IVETH  Phone#:  [phone]  ----------------------------------------------------------------------  RETURNING A CALL TO THE DR QUINN INFORMED PT TO CALL BACK BEFORE NOON         Diabetic test stays in nondiabetic range.  You may discontinue Prandin diabetic medicine for now and just monitor blood sugar periodically.  We want to avoid hypoglycemic episodes when sugar is too low.  Stable kidney function test, stable INR liver function is stable.  Blood count hemoglobin dropped slightly you left the hospital with level 8.5 that needs to be monitored closely.  Please give me a call I would like to speak to you.  Definitely you should have CBC count done 3 to 5 days prior to surgery.   Written by Peace Starks MD on 2024  9:00 AM CDT  Seen by proxy Minnie Haney on 2024 10:02 AM

## 2024-06-22 NOTE — PROGRESS NOTES
Subjective:     Patient ID: Joe Haney is a 69 year old male.  Presents for preop evaluation and address several concerns.    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  Indwelling  Calvillo catheter due to urinary retention , catheter is leaking currently.  Recent anemia requiring 2 admissions in May to the hospital, blood transfusions.  He underwent colonoscopy which revealed large polyp with features of high-grade dysplasia and recommended to have polyp removal by surgical intervention scheduled on July 3 at [facility] in [place] by Dr. Oswald.  Patient scheduled for urological procedure  HoLep to remove prostate at [facility] on July 1.  Upon discharge from the hospital during last hospitalization hemoglobin was 8.5.  INR 1.23.  Kidney function improved after Calvillo catheter insertion.  During hospital stay he underwent extensive cardiac workup including echocardiogram which showed normal left ventricular ejection fraction no significant valvular disease.  PET scan cardiac calcium score showed total score 340, , , left main to left circumflex 27.  Moderate sized inferolateral fixed defect.    Review of Systems   Constitutional:  Positive for activity change, appetite change and fatigue.   HENT:  Negative for sinus pain, sore throat and voice change.    Eyes:  Negative for pain and visual disturbance.   Respiratory:  Negative for cough, chest tightness, shortness of breath and wheezing.    Cardiovascular:  Negative for chest pain, palpitations and leg swelling.   Gastrointestinal:  Positive for abdominal distention. Negative for blood in stool, diarrhea, nausea and vomiting.   Genitourinary:  Positive for difficulty urinating.   Musculoskeletal:  Negative for back pain and gait problem.   Neurological:  Negative  for dizziness, tremors, seizures, speech difficulty and headaches.   Psychiatric/Behavioral:  Positive for confusion and decreased concentration. Negative for dysphoric mood and hallucinations. The patient is not nervous/anxious.        Current Outpatient Medications   Medication Sig Dispense Refill    atorvastatin 10 MG Oral Tab Take 1 tablet (10 mg total) by mouth nightly.      carvedilol 3.125 MG Oral Tab Take 1 tablet (3.125 mg total) by mouth 2 (two) times daily with meals.      Emollient (LUBRIDERM ADVANCED THERAPY) External Lotion Apply 1 Application topically daily as needed.      aspirin 81 MG Oral Tab EC Take 1 tablet (81 mg total) by mouth daily.      Amphetamine-Dextroamphet ER (ADDERALL XR) 25 MG Oral Capsule SR 24 Hr Take 1 capsule (25 mg total) by mouth every morning. Please refill medication patient lost medication being at the hospital 30 capsule 0    pantoprazole 40 MG Oral Tab EC Take 1 tablet (40 mg total) by mouth before breakfast. 90 tablet 3    tamsulosin 0.4 MG Oral Cap Take 2 capsules (0.8 mg total) by mouth daily. Take prior to bedtime 180 capsule 3    zinc sulfate 220 (50 Zn) MG Oral Cap Take 1 capsule (220 mg total) by mouth daily.      benzonatate 200 MG Oral Cap Take 1 capsule (200 mg total) by mouth 3 (three) times daily as needed for cough. 30 capsule 0    Ferrous Sulfate 325 (65 Fe) MG Oral Tab Take 1 tablet (325 mg total) by mouth daily.      lactulose 10 GM/15ML Oral Solution Take 30 mL (20 g total) by mouth as needed.      Potassium Chloride ER 20 MEQ Oral Tab CR TAKE 1 TABLET BY MOUTH TWICE DAILY FOR 2 DAYS. CONTINUE 1 TABLET DAILY THEREAFTER (Patient taking differently: Take 10 mEq by mouth in the morning and 10 mEq before bedtime. TAKE 1 TABLET BY MOUTH TWICE DAILY FOR 2 DAYS. CONTINUE 1 TABLET DAILY THEREAFTER.) 90 tablet 0    escitalopram 10 MG Oral Tab Take 1 tablet (10 mg total) by mouth daily. 90 tablet 3    Repaglinide 1 MG Oral Tab Take 2 tablets (2 mg total) by mouth 3  (three) times daily before meals.      bumetanide 2 MG Oral Tab Take 0.5 tablets (1 mg total) by mouth 2 (two) times daily.      omega-3 fatty acids 1000 MG Oral Cap Take 1,000 mg by mouth at bedtime.      Multiple Vitamin (MULTIVITAMIN ADULT OR) Take 1 tablet by mouth daily.      Probiotic Product (PROBIOTIC-10 OR) Take 1 capsule by mouth daily.      fluticasone propionate 50 MCG/ACT Nasal Suspension 2 sprays by Each Nare route daily. 1 each 1    rifaximin (XIFAXAN) 550 MG Oral Tab Take 1 tablet (550 mg total) by mouth 2 (two) times daily. 60 tablet 11    aMILoride 5 MG Oral Tab Take 2 tablets (10 mg total) by mouth 2 (two) times a day. The patient is to take 20mg per day (Patient taking differently: Take 2 tablets (10 mg total) by mouth in the morning and 2 tablets (10 mg total) before bedtime.) 120 tablet 3    hydrocortisone 2.5 % External Cream as needed.      Clobetasol Propionate 0.05 % External Liquid Apply 125 mL topically As Directed. shampoo      Amphetamine-Dextroamphet ER (ADDERALL XR) 25 MG Oral Capsule SR 24 Hr Take 1 capsule (25 mg total) by mouth every morning. 30 capsule 0    Continuous Blood Gluc Sensor (FREESTYLE GOLD 3 SENSOR) Does not apply Misc 1 each daily. 1 each 11    Psyllium 0.52 g Oral Cap Not using at this time (Patient not taking: Reported on 6/21/2024)       Allergies:No Known Allergies    Past Medical History:    Allergic rhinitis    Ragweed Pollen    Anxiety    Learning / catching up on age related medical issues.    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)    HE, Non bleeding varices    Neuropathy    hands and feet    Obesity    Osteoarthritis    Psoriasis    Sarcoidosis of lung (HCC)    Sleep apnea    Visual impairment    EYEGLASSES      Past Surgical History:    Procedure Laterality Date    Cataract Bilateral     IOL's    Colonoscopy N/A 10/18/2018    Procedure: COLONOSCOPY;  Surgeon: Jude Escamilla MD;  [facility] ENDOSCOPY    Colonoscopy N/A 02/18/2019    Jude Escamilla MD;  Polyps (TA)   Repeat 2024    Colonoscopy  4/21/21= Polyps (TA)    Repeat 2024    Colonoscopy N/A 04/21/2021    Procedure: COLONOSCOPY with HOT SNARE POLYPECTOMY, ESOPHAGOGASTRODUODENOSCOPY (EGD);  Surgeon: Flip Corey MD;  Location:  ENDOSCOPY    Hip replacement surgery  Sept 2015    Right Hip    Skin surgery  09/07/2018    excision back melanoma     Tonsillectomy  1961    Total hip replacement Right 09/2015    Upper gi endoscopy performed  4/21/21= Grade 2 varices    Upper gi endoscopy,ligat varix  07/25/2023    Variceal bleed, banded in [place]    Vasectomy  1993      Family History   Problem Relation Age of Onset    Cancer Father         Melanoma    Other (Other) Mother         strokes    Cancer Mother         Colon Cancer    Dementia Mother         NOT Alzheimers, but cognitive impairment    Hypertension Mother         Hydrochlorothiazide    Other (Other) Daughter         ASTHMA    Other (Other) Son         ASTHMA    Anemia Brother         Uses Ferrous Sulphate 325mg    Asthma Brother     Cancer Brother         Colon Cancer & Squamus Cell skin cancer    Asthma Son     Obesity Son     Psychiatric Son         ADHD    Asthma Son     Asthma Daughter     Obesity Daughter     Cancer Sister         Melanoma      Social History:   Social History     Socioeconomic History    Marital status:    Tobacco Use    Smoking status: Former     Current packs/day: 0.00     Average packs/day: 2.0 packs/day for 25.0 years (50.0 ttl pk-yrs)     Types: Cigarettes     Start date: 4/7/1989     Quit date: 4/7/2014     Years since quitting: 10.2    Smokeless tobacco: Never    Tobacco comments:     [age], light smoker, quit [age], medium smoker 43-58, 59+ quit smoking   Vaping Use    Vaping status:  Never Used   Substance and Sexual Activity    Alcohol use: Not Currently     Comment: very light social drinker, 1-2 wine or 1-2 beer, none 2016+    Drug use: No     Social Determinants of Health     Financial Resource Strain: Patient Declined (7/22/2023)    Received from [facility]    Overall Financial Resource Strain (CARDIA)     Difficulty of Paying Living Expenses: Patient declined   Food Insecurity: Low Risk  (5/24/2024)    Received from [facility]    Food Insecurity     Have there been times that your food ran out, and you didn't have money to get more?: No     Are there times that you worry that this might happen?: No   Transportation Needs: Low Risk  (5/24/2024)    Received from [facility]    Transportation Needs     Do you have trouble getting transportation to medical appointments?: No   Physical Activity: Patient Declined (7/22/2023)    Received from [facility]    Exercise Vital Sign     Days of Exercise per Week: Patient declined     Minutes of Exercise per Session: Patient declined   Stress: Patient Declined (7/22/2023)    Received from [facility]    [employer] [place] [employer] - Occupational Stress Questionnaire     Feeling of Stress : Patient declined   Social Connections: Patient Declined (7/22/2023)    Received from [facility]    Social Connection and Isolation Panel [NHANES]     Frequency of Communication with Friends and Family: Patient declined     Frequency of Social Gatherings with Friends and Family: Patient declined     Attends Scientology Services: Patient declined     Active Member of Clubs or Organizations: Patient declined     Attends Club or Organization Meetings: Patient declined     Marital Status: Patient declined   Housing Stability: Low Risk  (5/24/2024)    Received from [facility]    Housing Stability     Are you worried that your electric, gas, oil, or water might be shut off?: No      Are you concerned about having a safe and reliable place to live?: No      /67   Pulse 87   Ht 5' 5\" (1.651 m)   Wt 237 lb (107.5 kg)   BMI 39.44 kg/m²    Physical Exam  Constitutional:       Appearance: Normal appearance. He is obese. He is ill-appearing.   HENT:      Head: Normocephalic and atraumatic.   Eyes:      General: No scleral icterus.     Extraocular Movements: Extraocular movements intact.      Conjunctiva/sclera: Conjunctivae normal.      Pupils: Pupils are equal, round, and reactive to light.   Cardiovascular:      Rate and Rhythm: Normal rate and regular rhythm.      Heart sounds: S1 normal and S2 normal. Murmur heard.      Systolic murmur is present with a grade of 2/6.   Pulmonary:      Effort: Pulmonary effort is normal.      Breath sounds: Normal breath sounds. No wheezing or rhonchi.   Abdominal:      General: Bowel sounds are normal.      Palpations: Abdomen is soft.      Tenderness: There is no abdominal tenderness.      Hernia: A hernia is present. Hernia is present in the umbilical area.   Musculoskeletal:         General: Normal range of motion.      Cervical back: Normal range of motion and neck supple.      Right lower le+ Pitting Edema present.      Left lower le+ Pitting Edema present.   Skin:     General: Skin is warm.      Coloration: Skin is pale. Skin is not jaundiced.   Neurological:      General: No focal deficit present.      Mental Status: He is alert and oriented to person, place, and time. Mental status is at baseline.         Assessment & Plan:     ICD-10-CM    1. 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 (HCC)  K75.81     K74.60       2. BPH w urinary obs/LUTS patient scheduled for HoLep N40.1     N13.8       3. Tubular adenoma of colon patient scheduled for laparoscopic resection of the polyp D12.6       4. Pancytopenia (HCC) stable, counts need to be monitored prior to upcoming surgery to ensure that platelets do not fall below 50,000 for hemoglobin below 7.0  D61.818 CBC With Differential With Platelet     Prothrombin Time (PT)     Basic Metabolic Panel (8) [E]      5. Type 2 diabetes mellitus without complication, without long-term current use of insulin (HCC) controlled E11.9 Hemoglobin A1C      6. S/P TIPS (transjugular intrahepatic portosystemic shunt) stable Z95.828       7. Hepatic encephalopathy (HCC) controlled by lactulose K76.82       8. Portal hypertension (HCC)  K76.6       Will check labs today, will need labs 2 days prior to surgery    Orders Placed This Encounter   Procedures    CBC With Differential With Platelet    Prothrombin Time (PT)    Basic Metabolic Panel (8) [E]    Hemoglobin A1C   Patient is high risk especially for abdominal surgery    Meds This Visit:        Imaging & Referrals:  None

## 2024-06-24 NOTE — TELEPHONE ENCOUNTER
Spoke to Dr. Jones urologist, patient scheduled for prostate surgery on July 1, 2024, I reviewed with him that patient scheduled for 4 colon polyp removal possible colon resection in 48 hours after his procedure, he agreed that prostate surgery can be postponed and done after patient recovers from colon surgery.  I notified patient and his wife about that.  They may get a call from urology office for video visit to have plan for the future treatment

## 2024-07-15 DIAGNOSIS — F90.0 ATTENTION DEFICIT HYPERACTIVITY DISORDER (ADHD), PREDOMINANTLY INATTENTIVE TYPE: ICD-10-CM

## 2024-07-18 RX ORDER — DEXTROAMPHETAMINE SACCHARATE, AMPHETAMINE ASPARTATE MONOHYDRATE, DEXTROAMPHETAMINE SULFATE AND AMPHETAMINE SULFATE 6.25; 6.25; 6.25; 6.25 MG/1; MG/1; MG/1; MG/1
25 CAPSULE, EXTENDED RELEASE ORAL EVERY MORNING
Qty: 30 CAPSULE | Refills: 0 | Status: SHIPPED | OUTPATIENT
Start: 2024-07-18

## 2024-07-18 NOTE — TELEPHONE ENCOUNTER
Please review. Protocol Failed; No Protocol    Please see patients MyChart message:        Amphetamine-Dextroamphet ER (ADDERALL XR) 25 MG Oral Capsule SR 24 Hr [Kaylee Johnson]      Patient Comment: Benji has two capsules left so he needs the refill ASAP. Thank you.    Routing to [facility] due to High Priority status and Dr. Starks is out of office.       Recent fills: 3/30/2024, 4/28/2024, 6/15/2024  Last Rx written: 6/15/2024  Last office visit: 6/21/2024    Recent Visits  Date Type Provider Dept   06/21/24 Office Visit Peace Starks MD [facility]   05/18/24 Office Visit Kaylee Johnson APRN [facility]     Future Appointments  Date Type Provider Dept   08/12/24 Appointment Peace Starks MD [facility]   Showing future appointments within next 150 days with a meds authorizing provider and meeting all other requirements        Requested Prescriptions   Pending Prescriptions Disp Refills    Amphetamine-Dextroamphet ER (ADDERALL XR) 25 MG Oral Capsule SR 24 Hr 30 capsule 0     Sig: Take 1 capsule (25 mg total) by mouth every morning.       Controlled Substance Medication Failed - 7/15/2024  2:42 PM        Failed - This medication is a controlled substance - forward to provider to refill               Future Appointments         Provider Department Appt Notes    In 3 weeks Peace Starks MD [facility] [address], [place] f/u from post op surgery          Recent Outpatient Visits              3 weeks ago 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 (HCC)    [facility] [address], [place] Peace Starks MD    Office Visit    2 months ago 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 (HCC)    [facility] [address], [place] Kaylee Johnson APRN    Office Visit    3 months ago [facility] urinary tract symptoms    [facility]bs [facility]    Office Visit    3 months ago Type 2 diabetes mellitus without  complication, without long-term current use of insulin ([facility]    Office Visit    4 months ago Platelets decreased ([facility])    [facility], [place] Sas, Kathryn E., APRN    Office Visit

## 2024-07-24 ENCOUNTER — TELEPHONE (OUTPATIENT)
Dept: INTERNAL MEDICINE CLINIC | Facility: CLINIC | Age: 69
End: 2024-07-24

## 2024-07-24 NOTE — TELEPHONE ENCOUNTER
Patient wife  calling ( name and date of birth of patient verified ) on Release of Information     Patient was recently an inpatient at [facility] and yesterday CMP   Glucose was elevated  at 264  but this was non fasting drawn    Glucose  65 - 100 mg/dL 264 High      Patient is on the Liver transplant list on [facility]     Wife asking to be seen sooner for post-op  /post hospital visit     Future Appointments   Date Time Provider Department Center   8/2/2024  4:40 PM Peace Starks MD [id number]  [place]

## 2024-07-25 RX ORDER — POTASSIUM CHLORIDE 1500 MG/1
TABLET, EXTENDED RELEASE ORAL
Qty: 90 TABLET | Refills: 0 | OUTPATIENT
Start: 2024-07-25

## 2024-07-25 NOTE — TELEPHONE ENCOUNTER
Please Review. Protocol Failed; No Protocol     Requested Prescriptions   Pending Prescriptions Disp Refills    POTASSIUM CHLORIDE ER 20 MEQ Oral Tab CR [Pharmacy Med Name: POTASSIUM CHLORIDE 20MEQ ER TABLETS] 90 tablet 0     Sig: TAKE 1 TABLET BY MOUTH TWICE DAILY FOR 2 DAYS. CONTINUE 1 TABLET DAILY THEREAFTER       There is no refill protocol information for this order            Future Appointments         Provider Department Appt Notes    In 1 week Peace Starks MD [facility] [address], [place] [facility]/ surgical follow up          Recent Outpatient Visits              1 month ago 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 (HCC)    [facility] [address], [place] Peace Starks MD    Office Visit    2 months ago 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 ([facility])    [facility] [address], [place] Kaylee Johnson APRN    Office Visit    3 months ago [facility] urinary tract symptoms    [facility]    Office Visit    4 months ago Type 2 diabetes mellitus without complication, without long-term current use of insulin ([facility])    [facility] [address], [place] Peace Starks MD    Office Visit    5 months ago Platelets decreased ([facility])    [facility] [address], [place] Kaylee Johnson APRN    Office Visit

## 2024-08-02 ENCOUNTER — OFFICE VISIT (OUTPATIENT)
Dept: INTERNAL MEDICINE CLINIC | Facility: CLINIC | Age: 69
End: 2024-08-02
Payer: COMMERCIAL

## 2024-08-02 VITALS
HEART RATE: 101 BPM | DIASTOLIC BLOOD PRESSURE: 66 MMHG | WEIGHT: 224 LBS | BODY MASS INDEX: 37.32 KG/M2 | HEIGHT: 65 IN | SYSTOLIC BLOOD PRESSURE: 116 MMHG

## 2024-08-02 DIAGNOSIS — L03.311 CELLULITIS OF ABDOMINAL WALL: Primary | ICD-10-CM

## 2024-08-02 DIAGNOSIS — K74.60 LIVER CIRRHOSIS SECONDARY TO NASH (HCC): ICD-10-CM

## 2024-08-02 DIAGNOSIS — K75.81 LIVER CIRRHOSIS SECONDARY TO NASH (HCC): ICD-10-CM

## 2024-08-02 DIAGNOSIS — Z97.8 FOLEY CATHETER IN PLACE: ICD-10-CM

## 2024-08-02 DIAGNOSIS — F90.0 ATTENTION DEFICIT HYPERACTIVITY DISORDER (ADHD), PREDOMINANTLY INATTENTIVE TYPE: ICD-10-CM

## 2024-08-02 DIAGNOSIS — N40.1 ENLARGED PROSTATE WITH URINARY OBSTRUCTION: ICD-10-CM

## 2024-08-02 DIAGNOSIS — N13.8 ENLARGED PROSTATE WITH URINARY OBSTRUCTION: ICD-10-CM

## 2024-08-02 DIAGNOSIS — C18.9 ADENOCARCINOMA, COLON (HCC): ICD-10-CM

## 2024-08-02 DIAGNOSIS — E11.21 CONTROLLED TYPE 2 DIABETES MELLITUS WITH DIABETIC NEPHROPATHY, WITHOUT LONG-TERM CURRENT USE OF INSULIN (HCC): ICD-10-CM

## 2024-08-02 DIAGNOSIS — D50.0 IRON DEFICIENCY ANEMIA DUE TO CHRONIC BLOOD LOSS: ICD-10-CM

## 2024-08-02 PROBLEM — Z90.49 S/P PARTIAL COLECTOMY: Status: ACTIVE | Noted: 2024-08-02

## 2024-08-02 PROCEDURE — 3078F DIAST BP <80 MM HG: CPT | Performed by: INTERNAL MEDICINE

## 2024-08-02 PROCEDURE — 1126F AMNT PAIN NOTED NONE PRSNT: CPT | Performed by: INTERNAL MEDICINE

## 2024-08-02 PROCEDURE — 3074F SYST BP LT 130 MM HG: CPT | Performed by: INTERNAL MEDICINE

## 2024-08-02 PROCEDURE — 99214 OFFICE O/P EST MOD 30 MIN: CPT | Performed by: INTERNAL MEDICINE

## 2024-08-02 PROCEDURE — 3008F BODY MASS INDEX DOCD: CPT | Performed by: INTERNAL MEDICINE

## 2024-08-02 PROCEDURE — 1111F DSCHRG MED/CURRENT MED MERGE: CPT | Performed by: INTERNAL MEDICINE

## 2024-08-02 RX ORDER — CEPHALEXIN 500 MG/1
500 CAPSULE ORAL 3 TIMES DAILY
Qty: 30 CAPSULE | Refills: 0 | Status: SHIPPED | OUTPATIENT
Start: 2024-08-02

## 2024-08-02 RX ORDER — METOLAZONE 2.5 MG/1
2.5 TABLET ORAL DAILY
COMMUNITY

## 2024-08-03 NOTE — PROGRESS NOTES
Subjective:     Patient ID: Joe Haney is a 69 year old male.  Presents for follow-up of multiple concerns  HPI  By his wife, he underwent partial colectomy on 7/10/2024 at [facility], polyps were removed also adenocarcinoma spread to muscularis layer with of the colon were removed, 9 lymph nodes were negative for malignancy.  Patient scheduled to see hematologist oncologist, also will have follow-up on iron deficiency anemia and pancytopenia.  Patient is progressing appropriately after surgery, appetite decreased, he does not eat well, last total protein level was 5.6 albumin 2.1.  After surgery he has gained about 30 pounds, he was placed on dual diuretic by a pathology team and currently taking bumetanide, metakaxalon and amiloride.  Wife states that he has lost 20 pounds , watches salt intake.  He does not take lactulose lately, feels tired and easily falls asleep during the day.  He will be following up with hepatology team next week.    Postsurgical incision healed well, he had significant erythema of the abdominal wall which is improving.  Still present, denies fevers.    He has indwelling Calvillo catheter, it had to be changed recently because of increasing frequency of urination and pain in the penis, after replacement of Calvillo catheter symptoms subsided.  Patient is looking forward to schedule prostate surgery so he can get rid of the Calvillo catheter.  He is not physically active spends a lot of time sitting, started physical therapy to improve mobility.  Leg edema is present but not severe.  He denies shortness of breath, chest pains or headaches.    Current Outpatient Medications   Medication Sig Dispense Refill    metOLazone 2.5 MG Oral Tab Take 1 tablet (2.5 mg total) by mouth daily.      cephalexin 500 MG Oral Cap Take 1 capsule (500 mg total) by mouth 3 (three) times daily. 30 capsule 0    Amphetamine-Dextroamphet ER (ADDERALL XR) 25 MG Oral Capsule SR 24 Hr Take 1 capsule (25  mg total) by mouth every morning. 30 capsule 0    atorvastatin 10 MG Oral Tab Take 1 tablet (10 mg total) by mouth nightly.      carvedilol 3.125 MG Oral Tab Take 1 tablet (3.125 mg total) by mouth 2 (two) times daily with meals.      Amphetamine-Dextroamphet ER (ADDERALL XR) 25 MG Oral Capsule SR 24 Hr Take 1 capsule (25 mg total) by mouth every morning. Please refill medication patient lost medication being at the hospital 30 capsule 0    pantoprazole 40 MG Oral Tab EC Take 1 tablet (40 mg total) by mouth before breakfast. 90 tablet 3    tamsulosin 0.4 MG Oral Cap Take 2 capsules (0.8 mg total) by mouth daily. Take prior to bedtime 180 capsule 3    zinc sulfate 220 (50 Zn) MG Oral Cap Take 1 capsule (220 mg total) by mouth daily.      benzonatate 200 MG Oral Cap Take 1 capsule (200 mg total) by mouth 3 (three) times daily as needed for cough. 30 capsule 0    Ferrous Sulfate 325 (65 Fe) MG Oral Tab Take 1 tablet (325 mg total) by mouth daily.      Potassium Chloride ER 20 MEQ Oral Tab CR TAKE 1 TABLET BY MOUTH TWICE DAILY FOR 2 DAYS. CONTINUE 1 TABLET DAILY THEREAFTER (Patient taking differently: Take 10 mEq by mouth in the morning and 10 mEq before bedtime. TAKE 1 TABLET BY MOUTH TWICE DAILY FOR 2 DAYS. CONTINUE 1 TABLET DAILY THEREAFTER.) 90 tablet 0    escitalopram 10 MG Oral Tab Take 1 tablet (10 mg total) by mouth daily. 90 tablet 3    bumetanide 2 MG Oral Tab Take 0.5 tablets (1 mg total) by mouth 2 (two) times daily.      omega-3 fatty acids 1000 MG Oral Cap Take 1,000 mg by mouth at bedtime.      Multiple Vitamin (MULTIVITAMIN ADULT OR) Take 1 tablet by mouth daily.      Probiotic Product (PROBIOTIC-10 OR) Take 1 capsule by mouth daily.      rifaximin (XIFAXAN) 550 MG Oral Tab Take 1 tablet (550 mg total) by mouth 2 (two) times daily. 60 tablet 11    aMILoride 5 MG Oral Tab Take 2 tablets (10 mg total) by mouth 2 (two) times a day. The patient is to take 20mg per day (Patient taking differently: Take 2  tablets (10 mg total) by mouth in the morning and 2 tablets (10 mg total) before bedtime.) 120 tablet 3    Emollient (LUBRIDERM ADVANCED THERAPY) External Lotion Apply 1 Application topically daily as needed. (Patient not taking: Reported on 8/2/2024)      aspirin 81 MG Oral Tab EC Take 1 tablet (81 mg total) by mouth daily. (Patient not taking: Reported on 8/2/2024)      lactulose 10 GM/15ML Oral Solution Take 30 mL (20 g total) by mouth as needed. (Patient not taking: Reported on 8/2/2024)      Repaglinide 1 MG Oral Tab Take 2 tablets (2 mg total) by mouth 3 (three) times daily before meals. (Patient not taking: Reported on 8/2/2024)      fluticasone propionate 50 MCG/ACT Nasal Suspension 2 sprays by Each Nare route daily. (Patient not taking: Reported on 8/2/2024) 1 each 1    Continuous Blood Gluc Sensor (ShelfbucksSTYLE GOLD 3 SENSOR) Does not apply Misc 1 each daily. 1 each 11    Psyllium 0.52 g Oral Cap Not using at this time (Patient not taking: Reported on 6/21/2024)      hydrocortisone 2.5 % External Cream as needed. (Patient not taking: Reported on 8/2/2024)      Clobetasol Propionate 0.05 % External Liquid Apply 125 mL topically As Directed. shampoo (Patient not taking: Reported on 8/2/2024)       Allergies:No Known Allergies    Past Medical History:    Allergic rhinitis    Ragweed Pollen    Anxiety    Learning / catching up on age related medical issues.    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)    HE, Non bleeding varices    Neuropathy    hands and feet    Obesity    Osteoarthritis    Psoriasis    Sarcoidosis of lung (HCC)    Sleep apnea    Visual impairment    EYEGLASSES      Past Surgical History:   Procedure Laterality Date    Cataract Bilateral     IOL's    Colonoscopy N/A 10/18/2018     Procedure: COLONOSCOPY;  Surgeon: Jude Escamilla MD;  Location: [facility] ENDOSCOPY    Colonoscopy N/A 02/18/2019    Jude Escamilla MD;  Polyps (TA)   Repeat 2024    Colonoscopy  4/21/21= Polyps (TA)    Repeat 2024    Colonoscopy N/A 04/21/2021    Procedure: COLONOSCOPY with HOT SNARE POLYPECTOMY, ESOPHAGOGASTRODUODENOSCOPY (EGD);  Surgeon: Flip Corey MD;  Location:  ENDOSCOPY    Hip replacement surgery  Sept 2015    Right Hip    Skin surgery  09/07/2018    excision back melanoma     Tonsillectomy  1961    Total hip replacement Right 09/2015    Upper gi endoscopy performed  4/21/21= Grade 2 varices    Upper gi endoscopy,ligat varix  07/25/2023    Variceal bleed, banded in [place]    Vasectomy  1993      Family History   Problem Relation Age of Onset    Cancer Father         Melanoma    Other (Other) Mother         strokes    Cancer Mother         Colon Cancer    Dementia Mother         NOT Alzheimers, but cognitive impairment    Hypertension Mother         Hydrochlorothiazide    Other (Other) Daughter         ASTHMA    Other (Other) Son         ASTHMA    Anemia Brother         Uses Ferrous Sulphate 325mg    Asthma Brother     Cancer Brother         Colon Cancer & Squamus Cell skin cancer    Asthma Son     Obesity Son     Psychiatric Son         ADHD    Asthma Son     Asthma Daughter     Obesity Daughter     Cancer Sister         Melanoma      Social History:   Social History     Socioeconomic History    Marital status:    Tobacco Use    Smoking status: Former     Current packs/day: 0.00     Average packs/day: 2.0 packs/day for 25.0 years (50.0 ttl pk-yrs)     Types: Cigarettes     Start date: 4/7/1989     Quit date: 4/7/2014     Years since quitting: 10.3    Smokeless tobacco: Never    Tobacco comments:     [age], light smoker, quit [age], medium smoker [age], 59+ quit smoking   Vaping Use    Vaping status: Never Used   Substance and Sexual Activity    Alcohol use: Not Currently     Comment: very  light social drinker, 1-2 wine or 1-2 beer, none 2016+    Drug use: No     Social Determinants of Health     Financial Resource Strain: Patient Declined (7/22/2023)    Received from [facility]    Overall Financial Resource Strain (CARDIA)     Difficulty of Paying Living Expenses: Patient declined   Food Insecurity: Low Risk  (5/24/2024)    Received from [facility]    Food Insecurity     Have there been times that your food ran out, and you didn't have money to get more?: No     Are there times that you worry that this might happen?: No   Transportation Needs: Low Risk  (5/24/2024)    Received from [facility]    Transportation Needs     Do you have trouble getting transportation to medical appointments?: No   Physical Activity: Patient Declined (7/22/2023)    Received from [facility]    Exercise Vital Sign     Days of Exercise per Week: Patient declined     Minutes of Exercise per Session: Patient declined   Stress: Patient Declined (7/22/2023)    Received from [facility]    [employer] - Occupational Stress Questionnaire     Feeling of Stress : Patient declined   Social Connections: Patient Declined (7/22/2023)    Received from [facility]    Social Connection and Isolation Panel [NHANES]     Frequency of Communication with Friends and Family: Patient declined     Frequency of Social Gatherings with Friends and Family: Patient declined     Attends Church Services: Patient declined     Active Member of Clubs or Organizations: Patient declined     Attends Club or Organization Meetings: Patient declined     Marital Status: Patient declined   Housing Stability: Low Risk  (5/24/2024)    Received from [facility]    Housing Stability     Are you worried that your electric, gas, oil, or water might be shut off?: No     Are you concerned about having a safe and reliable place to live?: No        /66    Pulse 101   Ht 5' 5\" (1.651 m)   Wt 224 lb (101.6 kg)   BMI 37.28 kg/m²    Physical Exam  Constitutional:       Appearance: Normal appearance. He is obese. He is ill-appearing.   HENT:      Head: Normocephalic and atraumatic.   Eyes:      Extraocular Movements: Extraocular movements intact.      Conjunctiva/sclera: Conjunctivae normal.      Pupils: Pupils are equal, round, and reactive to light.   Neck:      Vascular: No carotid bruit.   Cardiovascular:      Rate and Rhythm: Normal rate and regular rhythm.      Heart sounds: No murmur heard.     No gallop.   Pulmonary:      Breath sounds: No wheezing or rhonchi.   Abdominal:      General: There is distension.      Palpations: Abdomen is soft.      Tenderness: There is no abdominal tenderness.      Comments: Large area of fatty tumor to skin around umbilicus, below umbilicus skin somewhat indurated and erythematous, no drainage or open wounds, healed postsurgical incision, dry skin    Musculoskeletal:         General: Normal range of motion.      Cervical back: Normal range of motion.      Right lower leg: Edema (+1 edema from the calf down) present.      Left lower leg: Edema (+1 edema from the calf down) present.   Feet:      Comments: Bilateral barefoot skin diabetic exam is abnormal with diabetic monofilament/sensation testing abnormal .  Sensitivity to touch diminished over both big toes, bunion left foot  Lymphadenopathy:      Cervical: No cervical adenopathy.   Skin:     General: Skin is warm.      Coloration: Skin is not jaundiced.   Neurological:      General: No focal deficit present.      Mental Status: He is alert and oriented to person, place, and time. Mental status is at baseline.      Comments: Patient usually falls asleep during office visit, but easily arousable   Psychiatric:         Mood and Affect: Mood normal.         Behavior: Behavior normal.         Thought Content: Thought content normal.         Assessment & Plan:   1. Cellulitis of  abdominal wall stable will start cephalexin 500 mg 3 times a day for 7 to 10 days if wife and patient to report in 3 to 5 days call he is doing   2. Attention deficit hyperactivity disorder (ADHD), predominantly inattentive type stable continue Adderall    3. 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 Enlarged prostate with urinary obstruction continue use Calvillo catheter, patient may proceed with surgery in about 6 to 8 weeks after he regains strength and becomes more mobile   6. Calvillo catheter in place see urology as needed   7. Adenocarcinoma, colon (HCC) status post colon resection, see hematology oncology for further recommendations   .8.        Controlled type 2 diabetes mellitus with diabetic nephropathy without long-term current use of insulin advised patient to monitor blood sugar fasting in the middle of the day if consistently stays higher than 230 start repaglinide 1 mg twice a day    Follow-up in 2 to 3 months  Meds This Visit:  Requested Prescriptions     Signed Prescriptions Disp Refills    cephalexin 500 MG Oral Cap 30 capsule 0     Sig: Take 1 capsule (500 mg total) by mouth 3 (three) times daily.       Imaging & Referrals:  None        None

## 2024-08-12 DIAGNOSIS — F90.0 ATTENTION DEFICIT HYPERACTIVITY DISORDER (ADHD), PREDOMINANTLY INATTENTIVE TYPE: ICD-10-CM

## 2024-08-12 RX ORDER — DEXTROAMPHETAMINE SACCHARATE, AMPHETAMINE ASPARTATE MONOHYDRATE, DEXTROAMPHETAMINE SULFATE AND AMPHETAMINE SULFATE 6.25; 6.25; 6.25; 6.25 MG/1; MG/1; MG/1; MG/1
25 CAPSULE, EXTENDED RELEASE ORAL EVERY MORNING
Qty: 30 CAPSULE | Refills: 0 | Status: CANCELLED | OUTPATIENT
Start: 2024-08-12

## 2024-08-12 RX ORDER — DEXTROAMPHETAMINE SACCHARATE, AMPHETAMINE ASPARTATE MONOHYDRATE, DEXTROAMPHETAMINE SULFATE AND AMPHETAMINE SULFATE 6.25; 6.25; 6.25; 6.25 MG/1; MG/1; MG/1; MG/1
25 CAPSULE, EXTENDED RELEASE ORAL EVERY MORNING
Qty: 30 CAPSULE | Refills: 0 | Status: SHIPPED | OUTPATIENT
Start: 2024-08-12

## 2024-08-12 NOTE — TELEPHONE ENCOUNTER
Please review. Protocol Failed; No Protocol      Recent fills: 4/28/2024, 6/15/2024, 7/18/2024  Last Rx written: 5/15/2024  DUE: 8/18/2024  Last office visit: 8/2/2024       Requested Prescriptions   Pending Prescriptions Disp Refills    Amphetamine-Dextroamphet ER (ADDERALL XR) 25 MG Oral Capsule SR 24 Hr 30 capsule 0     Sig: Take 1 capsule (25 mg total) by mouth every morning.       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

## 2024-08-12 NOTE — TELEPHONE ENCOUNTER
Patient's spouse Minnie called (on Release of Information), verified patient's Name and . States patient was seen in the office recently for a post-op visit. During the visit, patient requested refill of Adderall refill, however, no refill was sent. Patient is out of medication. Medication pended to run through protocol. Pharmacy verified.

## 2024-08-15 ENCOUNTER — TELEPHONE (OUTPATIENT)
Dept: INTERNAL MEDICINE CLINIC | Facility: CLINIC | Age: 69
End: 2024-08-15

## 2024-08-15 NOTE — TELEPHONE ENCOUNTER
Dotty RN calling from [facility]   Dotty states the patient is a current inpatient  ( Hepatic encephalopathy ) and will be discharged to home today       Dotty provided with fax number to [place] office     Dotty is asking if Dr. Starks will sign off on home Physical and Occupational therapy orders ?        Please advise and thank you.        Staff -may leave provider's response  on confidential VM  for  Dotty at [phone]

## 2024-08-15 NOTE — TELEPHONE ENCOUNTER
Left detailed message for LOKI Storm   that Dr Starks will sign off on Home health orders    Advised to call back with any questions/ concerns

## 2024-08-21 ENCOUNTER — TELEPHONE (OUTPATIENT)
Dept: INTERNAL MEDICINE CLINIC | Facility: CLINIC | Age: 69
End: 2024-08-21

## 2024-08-21 ENCOUNTER — APPOINTMENT (OUTPATIENT)
Dept: URBAN - METROPOLITAN AREA CLINIC 244 | Age: 69
Setting detail: DERMATOLOGY
End: 2024-08-22

## 2024-08-21 DIAGNOSIS — Z85.820 PERSONAL HISTORY OF MALIGNANT MELANOMA OF SKIN: ICD-10-CM

## 2024-08-21 DIAGNOSIS — D22 MELANOCYTIC NEVI: ICD-10-CM

## 2024-08-21 PROBLEM — D22.5 MELANOCYTIC NEVI OF TRUNK: Status: ACTIVE | Noted: 2024-08-21

## 2024-08-21 PROCEDURE — 99213 OFFICE O/P EST LOW 20 MIN: CPT

## 2024-08-21 PROCEDURE — OTHER COUNSELING: OTHER

## 2024-08-21 PROCEDURE — OTHER DEFER: OTHER

## 2024-08-21 PROCEDURE — OTHER CONSULTATION EXCISION: OTHER

## 2024-08-21 ASSESSMENT — LOCATION SIMPLE DESCRIPTION DERM
LOCATION SIMPLE: ABDOMEN
LOCATION SIMPLE: LEFT UPPER BACK

## 2024-08-21 ASSESSMENT — LOCATION DETAILED DESCRIPTION DERM
LOCATION DETAILED: LEFT SUPERIOR MEDIAL UPPER BACK
LOCATION DETAILED: LEFT LATERAL ABDOMEN

## 2024-08-21 ASSESSMENT — LOCATION ZONE DERM: LOCATION ZONE: TRUNK

## 2024-08-21 NOTE — TELEPHONE ENCOUNTER
Sandra from [facility] called and is requesting if patient can have a dietician go to evaluate the patient, trying to manage diet and not sure what to eat.     Sandra also asked that their are no orders for changing patients catheter, and is asking if order will come from  or urologist,

## 2024-09-04 ENCOUNTER — TELEPHONE (OUTPATIENT)
Dept: INTERNAL MEDICINE CLINIC | Facility: CLINIC | Age: 69
End: 2024-09-04

## 2024-09-04 NOTE — TELEPHONE ENCOUNTER
Dr Starks, please advise    [facility] is calling to ask if you will continue to sign home health orders for patient?    Patient was admitted into [facility] on 9/1/24 and being discharged today, 9/4/24. Admitted for Weakness and fall.     [facility] is asking for continued Nursing and physical therapy orders for the patient.     Triage RN-please call [phone] with PCP order, verbal order accepted.

## 2024-09-06 ENCOUNTER — TELEPHONE (OUTPATIENT)
Dept: INTERNAL MEDICINE CLINIC | Facility: CLINIC | Age: 69
End: 2024-09-06

## 2024-09-06 NOTE — TELEPHONE ENCOUNTER
Received fax from [facility].   Forms were placed on provider's desk for review.

## 2024-09-10 ENCOUNTER — TELEPHONE (OUTPATIENT)
Dept: INTERNAL MEDICINE CLINIC | Facility: CLINIC | Age: 69
End: 2024-09-10

## 2024-09-11 NOTE — TELEPHONE ENCOUNTER
Discharge orders signed and faxed over to [facility]. Orders faxed with confirmation.    #[id number]

## 2024-09-17 DIAGNOSIS — F90.0 ATTENTION DEFICIT HYPERACTIVITY DISORDER (ADHD), PREDOMINANTLY INATTENTIVE TYPE: ICD-10-CM

## 2024-09-18 RX ORDER — DEXTROAMPHETAMINE SACCHARATE, AMPHETAMINE ASPARTATE MONOHYDRATE, DEXTROAMPHETAMINE SULFATE AND AMPHETAMINE SULFATE 6.25; 6.25; 6.25; 6.25 MG/1; MG/1; MG/1; MG/1
25 CAPSULE, EXTENDED RELEASE ORAL EVERY MORNING
Qty: 30 CAPSULE | Refills: 0 | Status: SHIPPED | OUTPATIENT
Start: 2024-09-18 | End: 2024-09-19

## 2024-09-18 NOTE — TELEPHONE ENCOUNTER
Please review.  Protocol failed / Has no protocol.      Marked High Priority, patient states out of medication  [facility] Message sent to patient to locate pharmacy with medication in stock.    Adderall XR 25mg Recent fills : 6/15, 7/18, 8/14  Last prescription written: 8/12/24  Last office visit: 8/2/24    Future Appointments  Date Type Provider Dept   09/25/24 Appointment Peace Starks MD [facility]       Requested Prescriptions   Pending Prescriptions Disp Refills    Amphetamine-Dextroamphet ER (ADDERALL XR) 25 MG Oral Capsule SR 24 Hr 30 capsule 0     Sig: Take 1 capsule (25 mg total) by mouth every morning.       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

## 2024-09-19 ENCOUNTER — TELEPHONE (OUTPATIENT)
Dept: INTERNAL MEDICINE CLINIC | Facility: CLINIC | Age: 69
End: 2024-09-19

## 2024-09-19 RX ORDER — DEXTROAMPHETAMINE SACCHARATE, AMPHETAMINE ASPARTATE MONOHYDRATE, DEXTROAMPHETAMINE SULFATE AND AMPHETAMINE SULFATE 6.25; 6.25; 6.25; 6.25 MG/1; MG/1; MG/1; MG/1
25 CAPSULE, EXTENDED RELEASE ORAL EVERY MORNING
Qty: 30 CAPSULE | Refills: 0 | OUTPATIENT
Start: 2024-09-19

## 2024-09-19 RX ORDER — DEXTROAMPHETAMINE SACCHARATE, AMPHETAMINE ASPARTATE MONOHYDRATE, DEXTROAMPHETAMINE SULFATE AND AMPHETAMINE SULFATE 6.25; 6.25; 6.25; 6.25 MG/1; MG/1; MG/1; MG/1
25 CAPSULE, EXTENDED RELEASE ORAL EVERY MORNING
Qty: 30 CAPSULE | Refills: 0 | Status: CANCELLED | OUTPATIENT
Start: 2024-09-19

## 2024-09-19 NOTE — TELEPHONE ENCOUNTER
Please send to [facility]     Prescription sent 9/18/24 has been cancelled, due to backorder stock issues.

## 2024-09-19 NOTE — TELEPHONE ENCOUNTER
Marked High Priority, patient states out of medication    Please send to [facility] [place] [facility]    Prescription sent 9/18/24 has been cancelled, due to backorder stock issues.    Routing to [facility]mate due to High Priority status and Dr. Starks is out of office.

## 2024-09-19 NOTE — TELEPHONE ENCOUNTER
Patient's wife Minnie johnston,verified name and date of birth  .  Minnie is calling for update on generic Adderall prescription.   Advised her that a request was sent  this morning to Dr Starks and Kaylee Johnson to  send prescription  to [facility] [place].  Minnie verbalized understanding.

## 2024-09-20 ENCOUNTER — TELEPHONE (OUTPATIENT)
Dept: INTERNAL MEDICINE CLINIC | Facility: CLINIC | Age: 69
End: 2024-09-20

## 2024-09-20 DIAGNOSIS — F90.0 ATTENTION DEFICIT HYPERACTIVITY DISORDER (ADHD), PREDOMINANTLY INATTENTIVE TYPE: ICD-10-CM

## 2024-09-20 RX ORDER — DEXTROAMPHETAMINE SACCHARATE, AMPHETAMINE ASPARTATE MONOHYDRATE, DEXTROAMPHETAMINE SULFATE AND AMPHETAMINE SULFATE 6.25; 6.25; 6.25; 6.25 MG/1; MG/1; MG/1; MG/1
25 CAPSULE, EXTENDED RELEASE ORAL EVERY MORNING
Qty: 30 CAPSULE | Refills: 0 | Status: SHIPPED | OUTPATIENT
Start: 2024-09-20

## 2024-09-25 ENCOUNTER — OFFICE VISIT (OUTPATIENT)
Dept: INTERNAL MEDICINE CLINIC | Facility: CLINIC | Age: 69
End: 2024-09-25

## 2024-09-25 VITALS
HEIGHT: 65 IN | SYSTOLIC BLOOD PRESSURE: 113 MMHG | HEART RATE: 97 BPM | WEIGHT: 227.88 LBS | DIASTOLIC BLOOD PRESSURE: 64 MMHG | BODY MASS INDEX: 37.97 KG/M2

## 2024-09-25 DIAGNOSIS — N40.1 ENLARGED PROSTATE WITH URINARY OBSTRUCTION: ICD-10-CM

## 2024-09-25 DIAGNOSIS — E11.21 CONTROLLED TYPE 2 DIABETES MELLITUS WITH DIABETIC NEPHROPATHY, WITHOUT LONG-TERM CURRENT USE OF INSULIN (HCC): ICD-10-CM

## 2024-09-25 DIAGNOSIS — N13.8 ENLARGED PROSTATE WITH URINARY OBSTRUCTION: ICD-10-CM

## 2024-09-25 DIAGNOSIS — G47.39 OTHER SLEEP APNEA: ICD-10-CM

## 2024-09-25 DIAGNOSIS — Z97.8 FOLEY CATHETER IN PLACE: ICD-10-CM

## 2024-09-25 DIAGNOSIS — D50.8 OTHER IRON DEFICIENCY ANEMIA: Primary | ICD-10-CM

## 2024-09-25 DIAGNOSIS — I25.118 CORONARY ARTERY DISEASE OF NATIVE ARTERY OF NATIVE HEART WITH STABLE ANGINA PECTORIS (HCC): ICD-10-CM

## 2024-09-25 DIAGNOSIS — D69.6 THROMBOCYTOPENIA (HCC): ICD-10-CM

## 2024-09-25 PROBLEM — J41.0 SMOKERS' COUGH (HCC): Chronic | Status: ACTIVE | Noted: 2024-09-25

## 2024-09-25 PROCEDURE — 99214 OFFICE O/P EST MOD 30 MIN: CPT | Performed by: INTERNAL MEDICINE

## 2024-09-25 RX ORDER — MIDODRINE HYDROCHLORIDE 5 MG/1
5 TABLET ORAL 3 TIMES DAILY
COMMUNITY
Start: 2024-09-05

## 2024-09-29 PROBLEM — I25.118 CORONARY ARTERY DISEASE OF NATIVE ARTERY OF NATIVE HEART WITH STABLE ANGINA PECTORIS: Status: ACTIVE | Noted: 2024-09-29

## 2024-09-29 PROBLEM — J41.0 SMOKERS' COUGH (HCC): Chronic | Status: RESOLVED | Noted: 2024-09-25 | Resolved: 2024-09-29

## 2024-09-29 PROBLEM — I25.118 CORONARY ARTERY DISEASE OF NATIVE ARTERY OF NATIVE HEART WITH STABLE ANGINA PECTORIS (HCC): Status: ACTIVE | Noted: 2024-09-29

## 2024-09-29 NOTE — PROGRESS NOTES
Subjective:     Patient ID: Joe Haney is a 69 year old male.  Presents for follow-up of multiple medical conditions    HPI  Patient was treated at [facility] in August 2024 after a fall at home and weakness, he had increasing confusion at that time, he is not compliant with taking lactulose 3-4 times a day takes only once or twice only and that affects his mentation greatly.  He has lost a lot of weight from diuresis managed by hepatology clinic.  That possibly contributed to increasing confusion.  As of today he is feeling very weak tired.  He has chronic iron deficiency anemia last hemoglobin is 8.3, he underwent partial colectomy large polyp precancerous was removed.  He had iron transfusion several months ago.  He is planning to have urological procedure in order to get rid of Calvillo catheter.  Last platelet count was 86, count fluctuates never been below 50 as of yet.  Last INR 1.3   Patient continues to take Prandin 2 mg to 3 times a day with meals last hemoglobin A1c 6.8 on September 9, 2024, up from 5.7 in June.  He has been taking Adderall for many years to treat ADHD and medication is very helpful.  Patient states that CPAP equipment he uses is not sufficient and looking for replacement and perhaps need to  repeat  sleep study    Patient had abnormal stress test, and was advised to undergo coronary angiography and possible stent placement.  Current Outpatient Medications   Medication Sig Dispense Refill    midodrine 5 MG Oral Tab Take 1 tablet (5 mg total) by mouth 3 (three) times daily.      Amphetamine-Dextroamphet ER (ADDERALL XR) 25 MG Oral Capsule SR 24 Hr Take 1 capsule (25 mg total) by mouth every morning. 30 capsule 0    metOLazone 2.5 MG Oral Tab Take 1 tablet (2.5 mg total) by mouth daily.      cephalexin 500 MG Oral Cap Take 1 capsule (500 mg total) by mouth 3 (three) times daily. 30 capsule 0    atorvastatin 10 MG Oral Tab Take 1 tablet (10 mg total) by mouth nightly.       carvedilol 3.125 MG Oral Tab Take 1 tablet (3.125 mg total) by mouth 2 (two) times daily with meals.      pantoprazole 40 MG Oral Tab EC Take 1 tablet (40 mg total) by mouth before breakfast. 90 tablet 3    tamsulosin 0.4 MG Oral Cap Take 2 capsules (0.8 mg total) by mouth daily. Take prior to bedtime 180 capsule 3    zinc sulfate 220 (50 Zn) MG Oral Cap Take 1 capsule (220 mg total) by mouth daily.      benzonatate 200 MG Oral Cap Take 1 capsule (200 mg total) by mouth 3 (three) times daily as needed for cough. 30 capsule 0    Potassium Chloride ER 20 MEQ Oral Tab CR TAKE 1 TABLET BY MOUTH TWICE DAILY FOR 2 DAYS. CONTINUE 1 TABLET DAILY THEREAFTER (Patient taking differently: Take 10 mEq by mouth in the morning and 10 mEq before bedtime. TAKE 1 TABLET BY MOUTH TWICE DAILY FOR 2 DAYS. CONTINUE 1 TABLET DAILY THEREAFTER.) 90 tablet 0    escitalopram 10 MG Oral Tab Take 1 tablet (10 mg total) by mouth daily. 90 tablet 3    bumetanide 2 MG Oral Tab Take 0.5 tablets (1 mg total) by mouth 2 (two) times daily.      omega-3 fatty acids 1000 MG Oral Cap Take 1,000 mg by mouth at bedtime.      Multiple Vitamin (MULTIVITAMIN ADULT OR) Take 1 tablet by mouth daily.      Probiotic Product (PROBIOTIC-10 OR) Take 1 capsule by mouth daily.      Continuous Blood Gluc Sensor (FREESTYLE GOLD 3 SENSOR) Does not apply Misc 1 each daily. 1 each 11    rifaximin (XIFAXAN) 550 MG Oral Tab Take 1 tablet (550 mg total) by mouth 2 (two) times daily. 60 tablet 11    aMILoride 5 MG Oral Tab Take 2 tablets (10 mg total) by mouth 2 (two) times a day. The patient is to take 20mg per day 120 tablet 3    aspirin 81 MG Oral Tab EC Take 1 tablet (81 mg total) by mouth daily. (Patient not taking: Reported on 8/2/2024)      Ferrous Sulfate 325 (65 Fe) MG Oral Tab Take 1 tablet (325 mg total) by mouth daily.      lactulose 10 GM/15ML Oral Solution Take 30 mL (20 g total) by mouth as needed. (Patient not taking: Reported on 8/2/2024)      hydrocortisone  2.5 % External Cream as needed. (Patient not taking: Reported on 8/2/2024)      Clobetasol Propionate 0.05 % External Liquid Apply 125 mL topically As Directed. shampoo (Patient not taking: Reported on 8/2/2024)       Allergies:No Known Allergies    Past Medical History:    Allergic rhinitis    Ragweed Pollen    Anxiety    Learning / catching up on age related medical issues.    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)    HE, Non bleeding varices    Neuropathy    hands and feet    Obesity    Osteoarthritis    Psoriasis    Sarcoidosis of lung (HCC)    Sleep apnea    Visual impairment    EYEGLASSES      Past Surgical History:   Procedure Laterality Date    Cataract Bilateral     IOL's    Colonoscopy N/A 10/18/2018    Procedure: COLONOSCOPY;  Surgeon: Jude Escamilla MD;  Location: [facility] ENDOSCOPY    Colonoscopy N/A 02/18/2019    Jude Escamilla MD;  Polyps (TA)   Repeat 2024    Colonoscopy  4/21/21= Polyps (TA)    Repeat 2024    Colonoscopy N/A 04/21/2021    Procedure: COLONOSCOPY with HOT SNARE POLYPECTOMY, ESOPHAGOGASTRODUODENOSCOPY (EGD);  Surgeon: Flip Corey MD;  Location:  ENDOSCOPY    Hip replacement surgery  Sept 2015    Right Hip    Skin surgery  09/07/2018    excision back melanoma     Tonsillectomy  1961    Total hip replacement Right 09/2015    Upper gi endoscopy performed  4/21/21= Grade 2 varices    Upper gi endoscopy,ligat varix  07/25/2023    Variceal bleed, banded in [place]    Vasectomy  1993      Family History   Problem Relation Age of Onset    Cancer Father         Melanoma    Other (Other) Mother         strokes    Cancer Mother         Colon Cancer    Dementia Mother         NOT Alzheimers, but cognitive impairment    Hypertension Mother         Hydrochlorothiazide     Other (Other) Daughter         ASTHMA    Other (Other) Son         ASTHMA    Anemia Brother         Uses Ferrous Sulphate 325mg    Asthma Brother     Cancer Brother         Colon Cancer & Squamus Cell skin cancer    Asthma Son     Obesity Son     Psychiatric Son         ADHD    Asthma Son     Asthma Daughter     Obesity Daughter     Cancer Sister         Melanoma      Social History:   Social History     Socioeconomic History    Marital status:    Tobacco Use    Smoking status: Former     Current packs/day: 0.00     Average packs/day: 2.0 packs/day for 25.0 years (50.0 ttl pk-yrs)     Types: Cigarettes     Start date: 4/7/1989     Quit date: 4/7/2014     Years since quitting: 10.4    Smokeless tobacco: Never    Tobacco comments:     [age], light smoker, quit [age], medium smoker [age], 59+ quit smoking   Vaping Use    Vaping status: Never Used   Substance and Sexual Activity    Alcohol use: Not Currently     Comment: very light social drinker, 1-2 wine or 1-2 beer, none 2016+    Drug use: No     Social Determinants of Health     Financial Resource Strain: Patient Declined (7/22/2023)    Received from [facility]    Overall Financial Resource Strain (CARDIA)     Difficulty of Paying Living Expenses: Patient declined   Food Insecurity: Low Risk  (9/4/2024)    Received from [facility]    Food Insecurity     Have there been times that your food ran out, and you didn't have money to get more?: No     Are there times that you worry that this might happen?: No   Transportation Needs: Low Risk  (9/4/2024)    Received from [facility]    Transportation Needs     Do you have trouble getting transportation to medical appointments?: No   Physical Activity: Patient Declined (7/22/2023)    Received from [facility]    Exercise Vital Sign     Days of Exercise per Week: Patient declined     Minutes of Exercise per Session: Patient declined   Stress: Patient Declined  (7/22/2023)    Received from [facility]    [facility] - Occupational Stress Questionnaire     Feeling of Stress : Patient declined   Social Connections: Patient Declined (7/22/2023)    Received from [employer]    Social Connection and Isolation Panel [NHANES]     Frequency of Communication with Friends and Family: Patient declined     Frequency of Social Gatherings with Friends and Family: Patient declined     Attends [employer] Services: Patient declined     Active Member of Clubs or Organizations: Patient declined     Attends Club or Organization Meetings: Patient declined     Marital Status: Patient declined   Housing Stability: Low Risk  (9/4/2024)    Received from [facility]    Housing Stability     Are you worried that your electric, gas, oil, or water might be shut off?: No     Are you concerned about having a safe and reliable place to live?: No      /64 (BP Location: Right arm, Patient Position: Sitting, Cuff Size: adult)   Pulse 97   Ht 5' 5\" (1.651 m)   Wt 227 lb 14.4 oz (103.4 kg)   BMI 37.92 kg/m²    Physical Exam  Constitutional:       General: He is not in acute distress.     Appearance: Normal appearance.      Comments: Looks very tired   Eyes:      General: No scleral icterus.     Extraocular Movements: Extraocular movements intact.      Conjunctiva/sclera: Conjunctivae normal.      Pupils: Pupils are equal, round, and reactive to light.   Cardiovascular:      Rate and Rhythm: Normal rate and regular rhythm.      Heart sounds: No murmur heard.  Pulmonary:      Effort: Pulmonary effort is normal. No respiratory distress.      Breath sounds: No wheezing or rhonchi.   Abdominal:      General: Bowel sounds are normal.      Palpations: Abdomen is soft. There is no mass.      Tenderness: There is no abdominal tenderness. There is no guarding or rebound.   Musculoskeletal:         General: Normal range of motion.      Cervical back:  Normal range of motion.      Right lower leg: Edema (+1 edema from mid calf down) present.      Left lower leg: Edema (+1 edema from mid calf down) present.   Lymphadenopathy:      Cervical: No cervical adenopathy.   Skin:     General: Skin is warm.      Coloration: Skin is not jaundiced.   Neurological:      General: No focal deficit present.      Mental Status: He is alert and oriented to person, place, and time. Mental status is at baseline.   Psychiatric:         Mood and Affect: Mood normal.         Behavior: Behavior normal.         Thought Content: Thought content normal.         Assessment & Plan:   1. Other iron deficiency anemia advised patient to continue iron supplementation, see hematologist would benefit from iron infusion especially if surgical intervention contemplated   2. Other sleep apnea advised patient that he will need to do new sleep studies for Medicare compliance order provided   3. Controlled type 2 diabetes mellitus with diabetic nephropathy, without long-term current use of insulin (HCC) continue Prandin to 3 times a day   4. Enlarged prostate with urinary obstruction follow-up with urology, Calvillo catheter changes per home health   5. Calvillo catheter in place    6. Thrombocytopenia (HCC) continue to monitor   7.       Sleep apnea obstructive provide it order for new sleep study and CPAP titration  8.       Coronary artery disease of native artery of native heart with stable angina pectoris/H.CC/  Orders Placed This Encounter   Procedures    Ferritin    Iron And Tibc       Meds This Visit:  Requested Prescriptions      No prescriptions requested or ordered in this encounter       Imaging & Referrals:  [facility] REFERRAL DIAGOSTIC SLEEP STUDY ADULT     Follow-up in 3 months

## 2024-10-02 RX ORDER — REPAGLINIDE 2 MG/1
2 TABLET ORAL
Qty: 270 TABLET | Refills: 1 | Status: SHIPPED | OUTPATIENT
Start: 2024-10-02

## 2024-10-02 NOTE — TELEPHONE ENCOUNTER
Please review. Protocol Failed; No Protocol    Please advise if refill is appropriate.    Requested Prescriptions   Pending Prescriptions Disp Refills    REPAGLINIDE 2 MG Oral Tab [Pharmacy Med Name: REPAGLINIDE 2MG TABLETS] 270 tablet 1     Sig: TAKE 1 TABLET(2 MG) BY MOUTH THREE TIMES DAILY BEFORE MEALS       Diabetes Medication Protocol Failed - 9/29/2024  8:02 AM        Failed - Microalbumin procedure in past 12 months or taking ACE/ARB        Passed - Last A1C < 7.5 and within past 6 months     Lab Results   Component Value Date    A1C 5.7 (H) 06/21/2024             Passed - In person appointment or virtual visit in the past 6 mos or appointment in next 3 mos     Recent Outpatient Visits              1 week ago Other iron deficiency anemia    [facility], [address], [place] Peace Starks MD    Office Visit    2 months ago Cellulitis of abdominal wall    [facility], [address], [place] Peace Starks MD    Office Visit    3 months ago 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 (HCC)    [facility], [address], [place] Peace Starks MD    Office Visit    4 months ago 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 (HCC)    [facility], [address], [place] Kaylee Johnson APRN    Office Visit    6 months ago Lower urinary tract symptoms    [facility], [place] Taniya Coombs MD    Office Visit          Future Appointments         Provider Department Appt Notes    In 2 weeks Peace Starks MD [facility], [address], [place] sleep problems                    Passed - EGFRCR or GFRNAA > 50     GFR Evaluation  EGFRCR: 68 , resulted on 6/21/2024          Passed - GFR in the past 12 months               Future Appointments         Provider Department Appt Notes    In 2 weeks Peace Starks MD [facility], [address], [place] sleep problems          Recent Outpatient Visits              1  week ago Other iron deficiency anemia    [facility], [address], [place] Peace Starks MD    Office Visit    2 months ago Cellulitis of abdominal wall    [facility] [place] Kandel, N[place], MD    Office Visit    3 months ago 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

## 2024-10-04 ENCOUNTER — NURSE TRIAGE (OUTPATIENT)
Dept: INTERNAL MEDICINE CLINIC | Facility: CLINIC | Age: 69
End: 2024-10-04

## 2024-10-04 NOTE — TELEPHONE ENCOUNTER
Action Requested: Summary for Provider     []  Critical Lab, Recommendations Needed  [] Need Additional Advice  [x]   FYI    []   Need Orders  [] Need Medications Sent to Pharmacy  []  Other     SUMMARY: Wife is going to call 911. Going to [facility] Emergency Room now.   Wife wants Dr Starks to be updated about patient's situation.   10/2/24 [facility] ER Test results scanned in, would like Dr Starks to review.     Reason for call: worsening symptoms  Onset: Today     Wife of Patient called office. Date of birth and full name both confirmed.   All morning, patient has been very drowsy - but easy to awaken and arouse.   Fever today . Temp currently 99.3 F     At Emergency Room the other day, he was told there was bacteria in his urine but no antibiotics were given, per wife. Only Catheter was adjusted.   Denies shortness of breath, difficulty breathing, chest pain, chest pressure.     Patient responding to questions, but  sounds very drowsy to this RN     RN advised to call 911 now. RN offered to call 911.   Patient agreed - and wife says that patient never usually agrees to go to the Emergency Room or to call 911.     RN offered to call 911, but wife wants to call 911 herself after she gets ready to leave.     RN advised her to call 911 immediately, as soon as possible.     She verbalizes understanding of all information, and agreeable to plan.     Reason for Disposition   Patient sounds very sick or weak to the triager   Difficult to awaken or acting confused (e.g., disoriented, slurred speech)    Protocols used: Fever-A-OH, Neurologic Deficit-A-OH

## 2024-10-04 NOTE — TELEPHONE ENCOUNTER
Spoke with patient's spouse and she wanted to update Dr Starks patient is currently at [facility]

## 2024-10-04 NOTE — TELEPHONE ENCOUNTER
RN called patient's wife to follow up, to offer assistance if needed.     Patient's date of birth and full name both confirmed.   She says she will call 911 now. Patient is now refusing 911, but she says she will call 911 anyway.

## 2024-10-08 ENCOUNTER — TELEPHONE (OUTPATIENT)
Dept: INTERNAL MEDICINE CLINIC | Facility: CLINIC | Age: 69
End: 2024-10-08

## 2024-10-08 NOTE — TELEPHONE ENCOUNTER
Received forms from [facility]. Forms have been review by provider signed and successfully faxed.

## 2024-10-14 NOTE — TELEPHONE ENCOUNTER
midodrine 5 MG Oral Tab Take 1 tablet (5 mg total) by mouth 3 (three) times daily.

## 2024-10-16 RX ORDER — MIDODRINE HYDROCHLORIDE 5 MG/1
5 TABLET ORAL 3 TIMES DAILY
Qty: 90 TABLET | Refills: 0 | Status: SHIPPED | OUTPATIENT
Start: 2024-10-16

## 2024-10-16 NOTE — TELEPHONE ENCOUNTER
Please review. Protocol Failed; No Protocol    Medication is listed as patient reported.     Requested Prescriptions   Pending Prescriptions Disp Refills    midodrine 5 MG Oral Tab  0     Sig: Take 1 tablet (5 mg total) by mouth 3 (three) times daily.       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

## 2024-10-17 ENCOUNTER — TELEPHONE (OUTPATIENT)
Dept: INTERNAL MEDICINE CLINIC | Facility: CLINIC | Age: 69
End: 2024-10-17

## 2024-10-18 ENCOUNTER — OFFICE VISIT (OUTPATIENT)
Dept: INTERNAL MEDICINE CLINIC | Facility: CLINIC | Age: 69
End: 2024-10-18

## 2024-10-18 VITALS
SYSTOLIC BLOOD PRESSURE: 127 MMHG | DIASTOLIC BLOOD PRESSURE: 73 MMHG | HEIGHT: 65 IN | BODY MASS INDEX: 35.49 KG/M2 | HEART RATE: 93 BPM | WEIGHT: 213 LBS

## 2024-10-18 DIAGNOSIS — K76.82 HEPATIC ENCEPHALOPATHY (HCC): Primary | ICD-10-CM

## 2024-10-18 DIAGNOSIS — D61.818 PANCYTOPENIA (HCC): ICD-10-CM

## 2024-10-18 DIAGNOSIS — E11.21 CONTROLLED TYPE 2 DIABETES MELLITUS WITH DIABETIC NEPHROPATHY, WITHOUT LONG-TERM CURRENT USE OF INSULIN (HCC): ICD-10-CM

## 2024-10-18 DIAGNOSIS — F90.0 ATTENTION DEFICIT HYPERACTIVITY DISORDER (ADHD), PREDOMINANTLY INATTENTIVE TYPE: ICD-10-CM

## 2024-10-18 PROCEDURE — 99214 OFFICE O/P EST MOD 30 MIN: CPT | Performed by: INTERNAL MEDICINE

## 2024-10-20 RX ORDER — DEXTROAMPHETAMINE SACCHARATE, AMPHETAMINE ASPARTATE MONOHYDRATE, DEXTROAMPHETAMINE SULFATE AND AMPHETAMINE SULFATE 6.25; 6.25; 6.25; 6.25 MG/1; MG/1; MG/1; MG/1
25 CAPSULE, EXTENDED RELEASE ORAL DAILY
Qty: 30 CAPSULE | Refills: 0 | Status: SHIPPED | OUTPATIENT
Start: 2024-11-20 | End: 2024-12-21

## 2024-10-20 RX ORDER — DEXTROAMPHETAMINE SACCHARATE, AMPHETAMINE ASPARTATE MONOHYDRATE, DEXTROAMPHETAMINE SULFATE AND AMPHETAMINE SULFATE 6.25; 6.25; 6.25; 6.25 MG/1; MG/1; MG/1; MG/1
25 CAPSULE, EXTENDED RELEASE ORAL DAILY
Qty: 30 CAPSULE | Refills: 0 | Status: SHIPPED | OUTPATIENT
Start: 2024-12-21 | End: 2025-01-20

## 2024-10-20 RX ORDER — DEXTROAMPHETAMINE SACCHARATE, AMPHETAMINE ASPARTATE MONOHYDRATE, DEXTROAMPHETAMINE SULFATE AND AMPHETAMINE SULFATE 6.25; 6.25; 6.25; 6.25 MG/1; MG/1; MG/1; MG/1
25 CAPSULE, EXTENDED RELEASE ORAL DAILY
Qty: 30 CAPSULE | Refills: 0 | Status: SHIPPED | OUTPATIENT
Start: 2024-10-20 | End: 2024-11-19

## 2024-10-20 RX ORDER — DEXTROAMPHETAMINE SACCHARATE, AMPHETAMINE ASPARTATE MONOHYDRATE, DEXTROAMPHETAMINE SULFATE AND AMPHETAMINE SULFATE 6.25; 6.25; 6.25; 6.25 MG/1; MG/1; MG/1; MG/1
25 CAPSULE, EXTENDED RELEASE ORAL EVERY MORNING
Qty: 30 CAPSULE | Refills: 0 | Status: SHIPPED | OUTPATIENT
Start: 2024-10-20

## 2024-10-21 NOTE — PROGRESS NOTES
Subjective:     Patient ID: Joe Haney is a 69 year old male.  Presents for follow-up after recent hospitalization    HPI  Recently patient was admitted to [facility] with mental status changes, was found to have urinary tract infection, he has indwelling catheter, scheduled for urological procedure laser by Dr. Jones.  Patient is being followed for advanced liver cirrhosis, being treated by hepatologist at [facility].  Family present in the room wife reports that patient may be noncompliant with taking lactulose, mental status easily worsens he becomes lethargic if he does not take lactulose to 3 times a day.  Placed ammonia was 114 while hospitalized recent CBC showed decreased white blood cell count hemoglobin at 8.0 platelets 78,  Patient takes Adderall XL 25 mg daily for a long time doing well on medication, cannot be without, he is also taking Lexapro 10 mg daily.  Waiting cardiac catheterization may need placement but procedure cannot be done while patient has infection and indwelling Calvillo catheter.  Patient reports no chest pains or unusual shortness of breath    Current Outpatient Medications   Medication Sig Dispense Refill    Amphetamine-Dextroamphet ER (ADDERALL XR) 25 MG Oral Capsule SR 24 Hr Take 1 capsule (25 mg total) by mouth every morning. 30 capsule 0    Amphetamine-Dextroamphet ER (ADDERALL XR) 25 MG Oral Capsule SR 24 Hr Take 1 capsule (25 mg total) by mouth daily. 30 capsule 0    [START ON 11/20/2024] Amphetamine-Dextroamphet ER (ADDERALL XR) 25 MG Oral Capsule SR 24 Hr Take 1 capsule (25 mg total) by mouth daily. 30 capsule 0    [START ON 12/21/2024] Amphetamine-Dextroamphet ER (ADDERALL XR) 25 MG Oral Capsule SR 24 Hr Take 1 capsule (25 mg total) by mouth daily. 30 capsule 0    midodrine 5 MG Oral Tab Take 1 tablet (5 mg total) by mouth 3 (three) times daily. 90 tablet 0    Repaglinide 2 MG Oral Tab Take 1 tablet (2 mg total) by mouth 3 (three) times daily before meals.  270 tablet 1    metOLazone 2.5 MG Oral Tab Take 1 tablet (2.5 mg total) by mouth daily.      atorvastatin 10 MG Oral Tab Take 1 tablet (10 mg total) by mouth nightly.      carvedilol 3.125 MG Oral Tab Take 1 tablet (3.125 mg total) by mouth 2 (two) times daily with meals.      aspirin 81 MG Oral Tab EC Take 1 tablet (81 mg total) by mouth daily.      pantoprazole 40 MG Oral Tab EC Take 1 tablet (40 mg total) by mouth before breakfast. 90 tablet 3    tamsulosin 0.4 MG Oral Cap Take 2 capsules (0.8 mg total) by mouth daily. Take prior to bedtime 180 capsule 3    zinc sulfate 220 (50 Zn) MG Oral Cap Take 1 capsule (220 mg total) by mouth daily.      Ferrous Sulfate 325 (65 Fe) MG Oral Tab Take 1 tablet (325 mg total) by mouth daily.      lactulose 10 GM/15ML Oral Solution Take 30 mL (20 g total) by mouth as needed.      Potassium Chloride ER 20 MEQ Oral Tab CR TAKE 1 TABLET BY MOUTH TWICE DAILY FOR 2 DAYS. CONTINUE 1 TABLET DAILY THEREAFTER (Patient taking differently: Take 10 mEq by mouth in the morning and 10 mEq before bedtime. TAKE 1 TABLET BY MOUTH TWICE DAILY FOR 2 DAYS. CONTINUE 1 TABLET DAILY THEREAFTER.) 90 tablet 0    escitalopram 10 MG Oral Tab Take 1 tablet (10 mg total) by mouth daily. 90 tablet 3    bumetanide 2 MG Oral Tab Take 0.5 tablets (1 mg total) by mouth 2 (two) times daily.      omega-3 fatty acids 1000 MG Oral Cap Take 1,000 mg by mouth at bedtime.      Multiple Vitamin (MULTIVITAMIN ADULT OR) Take 1 tablet by mouth daily.      Probiotic Product (PROBIOTIC-10 OR) Take 1 capsule by mouth daily.      Continuous Blood Gluc Sensor (FREESTYLE GOLD 3 SENSOR) Does not apply Misc 1 each daily. 1 each 11    rifaximin (XIFAXAN) 550 MG Oral Tab Take 1 tablet (550 mg total) by mouth 2 (two) times daily. 60 tablet 11    aMILoride 5 MG Oral Tab Take 2 tablets (10 mg total) by mouth 2 (two) times a day. The patient is to take 20mg per day 120 tablet 3    hydrocortisone 2.5 % External Cream as needed.       Clobetasol Propionate 0.05 % External Liquid Apply 125 mL topically As Directed. shampoo       Allergies:Allergies[1]    Past Medical History:    Allergic rhinitis    Ragweed Pollen    Anxiety    Learning / catching up on age related medical issues.    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)    HE, Non bleeding varices    Neuropathy    hands and feet    Obesity    Osteoarthritis    Psoriasis    Sarcoidosis of lung (HCC)    Sleep apnea    Visual impairment    EYEGLASSES      Past Surgical History:   Procedure Laterality Date    Cataract Bilateral     IOL's    Colonoscopy N/A 10/18/2018    Procedure: COLONOSCOPY;  Surgeon: Jude Escamilla MD;  Location: [facility] ENDOSCOPY    Colonoscopy N/A 02/18/2019    Jude Escamilla MD;  Polyps (TA)   Repeat 2024    Colonoscopy  4/21/21= Polyps (TA)    Repeat 2024    Colonoscopy N/A 04/21/2021    Procedure: COLONOSCOPY with HOT SNARE POLYPECTOMY, ESOPHAGOGASTRODUODENOSCOPY (EGD);  Surgeon: Flip Corey MD;  Location:  ENDOSCOPY    Hip replacement surgery  Sept 2015    Right Hip    Skin surgery  09/07/2018    excision back melanoma     Tonsillectomy  1961    Total hip replacement Right 09/2015    Upper gi endoscopy performed  4/21/21= Grade 2 varices    Upper gi endoscopy,ligat varix  07/25/2023    Variceal bleed, banded in [place]    Vasectomy  1993      Family History   Problem Relation Age of Onset    Cancer Father         Melanoma    Other (Other) Mother         strokes    Cancer Mother         Colon Cancer    Dementia Mother         NOT Alzheimers, but cognitive impairment    Hypertension Mother         Hydrochlorothiazide    Other (Other) Daughter         ASTHMA    Other (Other) Son         ASTHMA    Anemia Brother         Uses Ferrous Sulphate 325mg     Asthma Brother     Cancer Brother         Colon Cancer & Squamus Cell skin cancer    Asthma Son     Obesity Son     Psychiatric Son         ADHD    Asthma Son     Asthma Daughter     Obesity Daughter     Cancer Sister         Melanoma      Social History:   Social History     Socioeconomic History    Marital status:    Tobacco Use    Smoking status: Former     Current packs/day: 0.00     Average packs/day: 2.0 packs/day for 25.0 years (50.0 ttl pk-yrs)     Types: Cigarettes     Start date: 4/7/1989     Quit date: 4/7/2014     Years since quitting: 10.5    Smokeless tobacco: Never    Tobacco comments:     [age], light smoker, quit [age], medium smoker [age], 59+ quit smoking   Vaping Use    Vaping status: Never Used   Substance and Sexual Activity    Alcohol use: Not Currently     Comment: very light social drinker, 1-2 wine or 1-2 beer, none 2016+    Drug use: No     Social Drivers of Health     Financial Resource Strain: Patient Declined (7/22/2023)    Received from [facility]    Overall Financial Resource Strain (CARDIA)     Difficulty of Paying Living Expenses: Patient declined   Food Insecurity: Low Risk  (10/4/2024)    Received from [facility]    Food Insecurity     Have there been times that your food ran out, and you didn't have money to get more?: No     Are there times that you worry that this might happen?: No   Transportation Needs: Low Risk  (10/4/2024)    Received from [facility]    Transportation Needs     Do you have trouble getting transportation to medical appointments?: No   Physical Activity: Patient Declined (7/22/2023)    Received from [facility]    Exercise Vital Sign     Days of Exercise per Week: Patient declined     Minutes of Exercise per Session: Patient declined   Stress: Patient Declined (7/22/2023)    Received from [facility]    [employer] - Occupational Stress Questionnaire     Feeling of  Stress : Patient declined   Social Connections: Patient Declined (7/22/2023)    [facility]    Social Connection and Isolation Panel [NHANES]     Frequency of Communication with Friends and Family: Patient declined     Frequency of Social Gatherings with Friends and Family: Patient declined     Attends Orthodox Services: Patient declined     Active Member of Clubs or Organizations: Patient declined     Attends Club or Organization Meetings: Patient declined     Marital Status: Patient declined   Housing Stability: Low Risk  (10/4/2024)    Received from [facility]    Housing Stability     Are you worried that your electric, gas, oil, or water might be shut off?: No     Are you concerned about having a safe and reliable place to live?: No        /73 (BP Location: Left arm, Patient Position: Sitting, Cuff Size: adult)   Pulse 93   Ht 5' 5\" (1.651 m)   Wt 213 lb (96.6 kg)   BMI 35.45 kg/m²    Physical Exam  Constitutional:       Appearance: Normal appearance. He is obese.   HENT:      Head: Normocephalic and atraumatic.   Eyes:      General: No scleral icterus.     Extraocular Movements: Extraocular movements intact.      Conjunctiva/sclera: Conjunctivae normal.      Pupils: Pupils are equal, round, and reactive to light.   Cardiovascular:      Rate and Rhythm: Normal rate and regular rhythm.      Heart sounds: No murmur heard.     No gallop.   Pulmonary:      Effort: Pulmonary effort is normal. No respiratory distress.      Breath sounds: No wheezing or rhonchi.   Abdominal:      General: Bowel sounds are normal.      Palpations: Abdomen is soft. There is no mass.      Tenderness: There is no abdominal tenderness. There is no guarding or rebound.   Musculoskeletal:         General: Normal range of motion.      Cervical back: Normal range of motion and neck supple.      Right lower leg: No edema.      Left lower leg: No edema.   Skin:     General: Skin is warm.       Coloration: Skin is not jaundiced.   Neurological:      General: No focal deficit present.      Mental Status: He is alert and oriented to person, place, and time. Mental status is at baseline.      Gait: Gait normal.   Psychiatric:         Mood and Affect: Mood normal.         Behavior: Behavior normal.         Thought Content: Thought content normal.         Assessment & Plan:   1. Hepatic encephalopathy (HCC) encourage patient to be compliant with lactulose, follow-up with hepatology clinic as prescribed advised patient that all medication pertinent to liver cirrhosis should come from hepatology clinic   2. Attention deficit hyperactivity disorder (ADHD), predominantly inattentive type    3. Pancytopenia (HCC) advised patient to revisit with hematology especially prior to upcoming surgery, he may benefit from iron infusions   4. Controlled type 2 diabetes mellitus with diabetic nephropathy, without long-term current use of insulin (HCC) stable at present, continue repaglinide   .  Advised patient    Follow-up as need  edMeds This Visit:  Requested Prescriptions     Signed Prescriptions Disp Refills    Amphetamine-Dextroamphet ER (ADDERALL XR) 25 MG Oral Capsule SR 24 Hr 30 capsule 0     Sig: Take 1 capsule (25 mg total) by mouth every morning.    Amphetamine-Dextroamphet ER (ADDERALL XR) 25 MG Oral Capsule SR 24 Hr 30 capsule 0     Sig: Take 1 capsule (25 mg total) by mouth daily.    Amphetamine-Dextroamphet ER (ADDERALL XR) 25 MG Oral Capsule SR 24 Hr 30 capsule 0     Sig: Take 1 capsule (25 mg total) by mouth daily.    Amphetamine-Dextroamphet ER (ADDERALL XR) 25 MG Oral Capsule SR 24 Hr 30 capsule 0     Sig: Take 1 capsule (25 mg total) by mouth daily.       Imaging & Referrals:  None            [1] No Known Allergies

## 2024-11-01 ENCOUNTER — TELEPHONE (OUTPATIENT)
Dept: INTERNAL MEDICINE CLINIC | Facility: CLINIC | Age: 69
End: 2024-11-01

## 2024-11-04 ENCOUNTER — TELEPHONE (OUTPATIENT)
Dept: INTERNAL MEDICINE CLINIC | Facility: CLINIC | Age: 69
End: 2024-11-04

## 2024-11-04 NOTE — TELEPHONE ENCOUNTER
Received forms from [facility]. Provider has signed and successfully faxed.

## 2024-11-08 ENCOUNTER — MED REC SCAN ONLY (OUTPATIENT)
Dept: INTERNAL MEDICINE CLINIC | Facility: CLINIC | Age: 69
End: 2024-11-08

## 2024-11-12 ENCOUNTER — TELEPHONE (OUTPATIENT)
Dept: INTERNAL MEDICINE CLINIC | Facility: CLINIC | Age: 69
End: 2024-11-12

## 2024-11-12 NOTE — TELEPHONE ENCOUNTER
Sharon  from  [facility]  [phone]    Just discharge from [facility] 11/10/24    Asking Nursing & PT orders    Please advise

## 2024-11-12 NOTE — TELEPHONE ENCOUNTER
Called Sharon  from  [facility], verified patient name and date of birth.  Advised Sharon as per Dr Starks's 11/12/24 note below. She verbalized understanding. Office fax number provided to send orders for signature.

## 2024-11-12 NOTE — TELEPHONE ENCOUNTER
Please advise home health that I do not follow patient at [facility], what ever orders I received they should follow I will sign off on his home health services, most of his specialist out of [facility] for any concern call [facility] specialist

## 2024-11-18 ENCOUNTER — TELEPHONE (OUTPATIENT)
Dept: FAMILY MEDICINE CLINIC | Facility: CLINIC | Age: 69
End: 2024-11-18

## 2024-11-18 NOTE — TELEPHONE ENCOUNTER
Home Health orders received from [facility], Orders on providers desk for review. Order ID [id number] and ID [id number]

## 2024-11-25 ENCOUNTER — TELEPHONE (OUTPATIENT)
Dept: INTERNAL MEDICINE CLINIC | Facility: CLINIC | Age: 69
End: 2024-11-25

## 2024-11-25 NOTE — TELEPHONE ENCOUNTER
Received forms from [facility], [facility]. Forms have been placed on providers desk for review.

## 2024-11-28 RX ORDER — MIDODRINE HYDROCHLORIDE 5 MG/1
5 TABLET ORAL 3 TIMES DAILY
Qty: 90 TABLET | Refills: 0 | Status: SHIPPED | OUTPATIENT
Start: 2024-11-28

## 2024-11-28 NOTE — TELEPHONE ENCOUNTER
Protocol Failed/ No Protocol    Requested Prescriptions   Pending Prescriptions Disp Refills    MIDODRINE 5 MG Oral Tab [Pharmacy Med Name: MIDODRINE 5MG TABLETS] 90 tablet 0     Sig: TAKE 1 TABLET(5 MG) BY MOUTH THREE TIMES DAILY       There is no refill protocol information for this order            Recent Outpatient Visits              [facility]    [facility]    2 months ago Other iron deficiency anemia    [facility]    [facility]    3 months ago Cellulitis of abdominal wall    [facility]    [facility]    5 months ago 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

## 2024-11-29 ENCOUNTER — TELEPHONE (OUTPATIENT)
Dept: INTERNAL MEDICINE CLINIC | Facility: CLINIC | Age: 69
End: 2024-11-29

## 2024-12-04 ENCOUNTER — TELEPHONE (OUTPATIENT)
Dept: INTERNAL MEDICINE CLINIC | Facility: CLINIC | Age: 69
End: 2024-12-04

## 2024-12-04 NOTE — TELEPHONE ENCOUNTER
Stacy , PT assistant  calling from [facility]   Stacy calling about patient current state of being tired, slightly confused, falling asleep easily     Then spoke with patient wife Minnie and Minnie  confirmed these symptoms  Recently had cole catheter removed on 11/26 and does wear diapers   Was hospitalized at [facility]  ( [facility] )     Advised to have post hospital appointment   Wife very agreeable     Appointment made     Future Appointments   Date Time Provider Department Center   12/5/2024 12:20 PM Kaylee Johnson, [id number] [place]

## 2024-12-09 ENCOUNTER — EXTERNAL FACILITY (OUTPATIENT)
Dept: FAMILY MEDICINE CLINIC | Facility: CLINIC | Age: 69
End: 2024-12-09

## 2024-12-09 DIAGNOSIS — M54.50 CHRONIC BILATERAL LOW BACK PAIN WITHOUT SCIATICA: ICD-10-CM

## 2024-12-09 DIAGNOSIS — G89.29 CHRONIC BILATERAL LOW BACK PAIN WITHOUT SCIATICA: ICD-10-CM

## 2024-12-09 DIAGNOSIS — K76.82 HEPATIC ENCEPHALOPATHY (HCC): ICD-10-CM

## 2024-12-09 DIAGNOSIS — R53.1 GENERALIZED WEAKNESS: Primary | ICD-10-CM

## 2024-12-09 DIAGNOSIS — R29.6 FREQUENT FALLS: ICD-10-CM

## 2024-12-09 DIAGNOSIS — K76.6 PORTAL HYPERTENSION (HCC): ICD-10-CM

## 2024-12-10 NOTE — PROGRESS NOTES
Joe Haney Author: Gurpreet Willingham MD     3/27/1955 MRN [id number]   Last Hospital  Admission      Last Hospital Discharge 2024 PCP Peace Starks MD   [facility]        CC --admitted to [facility] from [facility] for subacute rehab, generalized weakness    H.P.I Joe Haney is a 69 year old male with history of multiple medical problems including Rogers cirrhosis esophageal varices ascites volume overload coagulopathy, status post TIPS was admitted to the hospital with weakness, was found to be in hepatic encephalopathy  Treated with lactulose, and rifaximin  His weakness improved, his home medications were continued including midodrine and Bumex  Hepatology was consulted, patient had bowel movements and his encephalopathy improved  he was evaluated by PT/OT and subacute rehab was recommended  Patient transferred to [facility] in stable condition  Past Medical History:    Allergic rhinitis    Ragweed Pollen    Anxiety    Learning / catching up on age related medical issues.    Attention deficit hyperactivity disorder (ADHD)    BPH (benign prostatic hyperplasia)    Depression    Mild, related to age issues    Diabetes (HCC)    Esophageal reflux    Essential hypertension    High blood pressure    Liver cirrhosis secondary to ROGERS (HCC)    HE, Non bleeding varices    Neuropathy    hands and feet    Obesity    Osteoarthritis    Psoriasis    Sarcoidosis of lung (HCC)    Sleep apnea    Visual impairment    EYEGLASSES     Past Surgical History:   Procedure Laterality Date    Cataract Bilateral     IOL's    Colonoscopy N/A 10/18/2018    Procedure: COLONOSCOPY;  Surgeon: Jude Escamilla MD;  Location: [facility] ENDOSCOPY    Colonoscopy N/A 2019    Jude Escamilla MD;  Polyps (TA)   Repeat     Colonoscopy  21= Polyps (TA)    Repeat     Colonoscopy N/A 2021    Procedure: COLONOSCOPY with HOT SNARE POLYPECTOMY,  ESOPHAGOGASTRODUODENOSCOPY (EGD);  Surgeon: Flip Corey MD;  Location:  ENDOSCOPY    Hip replacement surgery  Sept 2015    Right Hip    Skin surgery  09/07/2018    excision back melanoma     Tonsillectomy  1961    Total hip replacement Right 09/2015    Upper gi endoscopy performed  4/21/21= Grade 2 varices    Upper gi endoscopy,ligat varix  07/25/2023    Variceal bleed, banded in [place]    Vasectomy  1993     Family History   Problem Relation Age of Onset    Cancer Father         Melanoma    Other (Other) Mother         strokes    Cancer Mother         Colon Cancer    Dementia Mother         NOT Alzheimers, but cognitive impairment    Hypertension Mother         Hydrochlorothiazide    Other (Other) Daughter         ASTHMA    Other (Other) Son         ASTHMA    Anemia Brother         Uses Ferrous Sulphate 325mg    Asthma Brother     Cancer Brother         Colon Cancer & Squamus Cell skin cancer    Asthma Son     Obesity Son     Psychiatric Son         ADHD    Asthma Son     Asthma Daughter     Obesity Daughter     Cancer Sister         Melanoma     Social History     Socioeconomic History    Marital status:    Tobacco Use    Smoking status: Former     Current packs/day: 0.00     Average packs/day: 2.0 packs/day for 25.0 years (50.0 ttl pk-yrs)     Types: Cigarettes     Start date: 4/7/1989     Quit date: 4/7/2014     Years since quitting: 10.6    Smokeless tobacco: Never    Tobacco comments:     16-26, light smoker, quit 26-43, medium smoker 43-58, 59+ quit smoking   Vaping Use    Vaping status: Never Used   Substance and Sexual Activity    Alcohol use: Not Currently     Comment: very light social drinker, 1-2 wine or 1-2 beer, none 2016+    Drug use: No     Social Drivers of Health     Financial Resource Strain: Patient Declined (7/22/2023)    Received from [employer]    Overall Financial Resource Strain (CARDIA)     Difficulty of Paying Living Expenses: Patient declined   Food Insecurity: Low Risk   (11/9/2024)    Received from [facility]    Food Insecurity     Have there been times that your food ran out, and you didn't have money to get more?: No     Are there times that you worry that this might happen?: No   Transportation Needs: Low Risk  (11/9/2024)    Received from [facility]    Transportation Needs     Do you have trouble getting transportation to medical appointments?: No   Physical Activity: Patient Declined (7/22/2023)    Received from [employer]    Exercise Vital Sign     Days of Exercise per Week: Patient declined     Minutes of Exercise per Session: Patient declined   Stress: Patient Declined (7/22/2023)    Received from [employer]    [employer] [place] [employer] - Occupational Stress Questionnaire     Feeling of Stress : Patient declined   Social Connections: Patient Declined (7/22/2023)    Received from [employer]    Social Connection and Isolation Panel [NHANES]     Frequency of Communication with Friends and Family: Patient declined     Frequency of Social Gatherings with Friends and Family: Patient declined     Attends Mu-ism Services: Patient declined     Active Member of Clubs or Organizations: Patient declined     Attends Club or Organization Meetings: Patient declined     Marital Status: Patient declined   Housing Stability: Low Risk  (11/9/2024)    Received from [facility]    Housing Stability     Are you worried that your electric, gas, oil, or water might be shut off?: No     Are you concerned about having a safe and reliable place to live?: No       ALLERGIES:  Allergies[1]    CODE STATUS:  Full Code    CURRENT MEDICATIONS   Current Outpatient Medications   Medication Sig Dispense Refill    midodrine 5 MG Oral Tab Take 1 tablet (5 mg total) by mouth 3 (three) times daily. 90 tablet 0    Amphetamine-Dextroamphet ER (ADDERALL XR) 25 MG Oral Capsule SR 24 Hr Take 1 capsule (25 mg total) by  mouth every morning. 30 capsule 0    Amphetamine-Dextroamphet ER (ADDERALL XR) 25 MG Oral Capsule SR 24 Hr Take 1 capsule (25 mg total) by mouth daily. 30 capsule 0    [START ON 12/21/2024] Amphetamine-Dextroamphet ER (ADDERALL XR) 25 MG Oral Capsule SR 24 Hr Take 1 capsule (25 mg total) by mouth daily. 30 capsule 0    Repaglinide 2 MG Oral Tab Take 1 tablet (2 mg total) by mouth 3 (three) times daily before meals. 270 tablet 1    metOLazone 2.5 MG Oral Tab Take 1 tablet (2.5 mg total) by mouth daily.      atorvastatin 10 MG Oral Tab Take 1 tablet (10 mg total) by mouth nightly.      carvedilol 3.125 MG Oral Tab Take 1 tablet (3.125 mg total) by mouth 2 (two) times daily with meals.      aspirin 81 MG Oral Tab EC Take 1 tablet (81 mg total) by mouth daily.      pantoprazole 40 MG Oral Tab EC Take 1 tablet (40 mg total) by mouth before breakfast. 90 tablet 3    tamsulosin 0.4 MG Oral Cap Take 2 capsules (0.8 mg total) by mouth daily. Take prior to bedtime 180 capsule 3    zinc sulfate 220 (50 Zn) MG Oral Cap Take 1 capsule (220 mg total) by mouth daily.      Ferrous Sulfate 325 (65 Fe) MG Oral Tab Take 1 tablet (325 mg total) by mouth daily.      lactulose 10 GM/15ML Oral Solution Take 30 mL (20 g total) by mouth as needed.      Potassium Chloride ER 20 MEQ Oral Tab CR TAKE 1 TABLET BY MOUTH TWICE DAILY FOR 2 DAYS. CONTINUE 1 TABLET DAILY THEREAFTER (Patient taking differently: Take 10 mEq by mouth in the morning and 10 mEq before bedtime. TAKE 1 TABLET BY MOUTH TWICE DAILY FOR 2 DAYS. CONTINUE 1 TABLET DAILY THEREAFTER.) 90 tablet 0    escitalopram 10 MG Oral Tab Take 1 tablet (10 mg total) by mouth daily. 90 tablet 3    bumetanide 2 MG Oral Tab Take 0.5 tablets (1 mg total) by mouth 2 (two) times daily.      omega-3 fatty acids 1000 MG Oral Cap Take 1,000 mg by mouth at bedtime.      Multiple Vitamin (MULTIVITAMIN ADULT OR) Take 1 tablet by mouth daily.      Probiotic Product (PROBIOTIC-10 OR) Take 1 capsule by  mouth daily.      Continuous Blood Gluc Sensor (FREESTYLE GOLD 3 SENSOR) Does not apply Misc 1 each daily. 1 each 11    rifaximin (XIFAXAN) 550 MG Oral Tab Take 1 tablet (550 mg total) by mouth 2 (two) times daily. 60 tablet 11    aMILoride 5 MG Oral Tab Take 2 tablets (10 mg total) by mouth 2 (two) times a day. The patient is to take 20mg per day 120 tablet 3    hydrocortisone 2.5 % External Cream as needed.      Clobetasol Propionate 0.05 % External Liquid Apply 125 mL topically As Directed. shampoo         REVIEW OF SYSTEMS:  Review of Systems:   Constitutional: No fevers, chills, fatigue or night sweats.  ENT: No mouth pain, neck pain, running nose, headaches or swollen glands.  Skin: No rashes, pruritus or skin changes,  Respiratory: Denies cough, wheezing or shortness of breath.  CV: Denies chest pain, palpitations, orthopnea, PND or dizziness.  Musculoskeletal: No joint pain, stiffness or swelling.  GI: No nausea, vomiting or diarrhea. No blood in stools.  Neurologic: No seizures, tremors, weakness or numbness    VITALS: Pulse 90/min respiration 18/min temperature 98.8 blood pressure 126/70    PHYSICAL EXAM:  GENERAL: well developed, well nourished, in no apparent distress  SKIN: no rashes, no suspicious lesions  Wound;  HEENT: atraumatic, normocephalic, ears and throat are clear  EYES: PERRLA, EOMI, conjunctiva are clear  NECK: supple, no adenopathy, no bruits  CHEST: no chest tenderness  LUNGS: clear to auscultation  CARDIO: RRR without murmur  GI: good BS's, no masses, HSM or tenderness  : deferred  RECTAL: deferred  MUSCULOSKELETAL: back is not tender, FROM of the back  EXTREMITIES: no cyanosis, clubbing or edema  NEURO: Oriented times three, cranial nerves are intact, motor and sensory are grossly intact      DIAGNOSTICS REVIEWED AT THIS VISIT:      Diagnoses and all orders for this visit:    Generalized weakness  - PT to work on ambulation, gait, balance, strength, endurance, transfers, safety  - OT  to work on fine motor skills, ADLs, hygiene, toileting, transfers, safety  Hepatic encephalopathy (HCC)  On lactulose and rifaximin  Monitor bowel movements  Portal hypertension (HCC)  Continue Bumex  Frequent falls  PT/OT  Chronic bilateral low back pain without sciatica  Pain control with Tylenol  ADHD  On Adderall 3 times a day  Anemia  Check weekly CBCs    - 24h nursing for medication administration, bowel/bladder care, pain/sleep assessment  - Physician supervision for multiple medical comorbidities, fall risk, DVT risk, infection risk, pain management  - Psych for adjustment to disability and cognitive deficits  - Social work/: for discharge planning needs and access to community resources as needed     -Hospital medications reviewed reconciled and restarted   Check the labs in the morning, CBC CMP TSH, vitamin D, UA    Time spent at appointment today is 95 minutes including preparing to see patient, reviewing test results, performing medically appropriate examination and evaluation and coordinating care, counseling and educating patient/family, ordering medications and testing, and documenting clinical information in EMR.       Gurpreet Willingham MD   [facility]  [address] [place] [address]    Electronically signed      This dictation was performed with a verbal recognition program (DRAGON) and it was checked for errors. It is possible that there are still dictated errors within this office note. If so, please bring any errors to my attention for an addendum. All efforts were made to ensure that this office note is accurate          [1] No Known Allergies

## 2024-12-13 ENCOUNTER — EXTERNAL FACILITY (OUTPATIENT)
Dept: FAMILY MEDICINE CLINIC | Facility: CLINIC | Age: 69
End: 2024-12-13

## 2024-12-13 DIAGNOSIS — D64.9 ANEMIA, UNSPECIFIED TYPE: ICD-10-CM

## 2024-12-13 DIAGNOSIS — K76.6 PORTAL HYPERTENSION (HCC): ICD-10-CM

## 2024-12-13 DIAGNOSIS — R29.6 FREQUENT FALLS: ICD-10-CM

## 2024-12-13 DIAGNOSIS — R53.1 GENERALIZED WEAKNESS: ICD-10-CM

## 2024-12-13 DIAGNOSIS — E87.6 HYPOKALEMIA: Primary | ICD-10-CM

## 2024-12-14 NOTE — PROGRESS NOTES
Joe Haney Author: Gurpreet Willingham MD     3/27/1955 MRN [id number]   Last Hospital  Admission      Last Hospital Discharge 2024 PCP Peace Starks MD   [facility]  [facility]        CC --a follow-up generalized weakness    H.P.I Joe Haney is a 69 year old male with history of multiple medical problems including Rogers cirrhosis esophageal varices ascites volume overload coagulopathy, status post TIPS was admitted to the hospital with weakness, was found to be in hepatic encephalopathy  Treated with lactulose, and rifaximin  His weakness improved, his home medications were continued including midodrine and Bumex  Hepatology was consulted, patient had bowel movements and his encephalopathy improved  he was evaluated by PT/OT and subacute rehab was recommended  Patient transferred to [facility] in stable condition    Patient seen today in his room, he is doing well denies any complaints  He feels that he is improving but still has problem off balance  He had episodes of dizziness  He states that the physical therapy helped   has been taking his medications regularly        Past Medical History:    Allergic rhinitis    Ragweed Pollen    Anxiety    Learning / catching up on age related medical issues.    Attention deficit hyperactivity disorder (ADHD)    BPH (benign prostatic hyperplasia)    Depression    Mild, related to age issues    Diabetes (HCC)    Esophageal reflux    Essential hypertension    High blood pressure    Liver cirrhosis secondary to ROGERS (HCC)    HE, Non bleeding varices    Neuropathy    hands and feet    Obesity    Osteoarthritis    Psoriasis    Sarcoidosis of lung (HCC)    Sleep apnea    Visual impairment    EYEGLASSES     Past Surgical History:   Procedure Laterality Date    Cataract Bilateral     IOL's    Colonoscopy N/A 10/18/2018    Procedure: COLONOSCOPY;  Surgeon: Jude Escamilla MD;  Location: [facility] ENDOSCOPY    Colonoscopy N/A 2019    Jude Escamilla  MD Mauricio;  Polyps (TA)   Repeat 2024    Colonoscopy  4/21/21= Polyps (TA)    Repeat 2024    Colonoscopy N/A 04/21/2021    Procedure: COLONOSCOPY with HOT SNARE POLYPECTOMY, ESOPHAGOGASTRODUODENOSCOPY (EGD);  Surgeon: Flip Corey MD;  Location:  ENDOSCOPY    Hip replacement surgery  Sept 2015    Right Hip    Skin surgery  09/07/2018    excision back melanoma     Tonsillectomy  1961    Total hip replacement Right 09/2015    Upper gi endoscopy performed  4/21/21= Grade 2 varices    Upper gi endoscopy,ligat varix  07/25/2023    Variceal bleed, banded in [place]    Vasectomy  1993     Family History   Problem Relation Age of Onset    Cancer Father         Melanoma    Other (Other) Mother         strokes    Cancer Mother         Colon Cancer    Dementia Mother         NOT Alzheimers, but cognitive impairment    Hypertension Mother         Hydrochlorothiazide    Other (Other) Daughter         ASTHMA    Other (Other) Son         ASTHMA    Anemia Brother         Uses Ferrous Sulphate 325mg    Asthma Brother     Cancer Brother         Colon Cancer & Squamus Cell skin cancer    Asthma Son     Obesity Son     Psychiatric Son         ADHD    Asthma Son     Asthma Daughter     Obesity Daughter     Cancer Sister         Melanoma     Social History     Socioeconomic History    Marital status:    Tobacco Use    Smoking status: Former     Current packs/day: 0.00     Average packs/day: 2.0 packs/day for 25.0 years (50.0 ttl pk-yrs)     Types: Cigarettes     Start date: 4/7/1989     Quit date: 4/7/2014     Years since quitting: 10.6    Smokeless tobacco: Never    Tobacco comments:     16-26, light smoker, quit 26-43, medium smoker 43-58, 59+ quit smoking   Vaping Use    Vaping status: Never Used   Substance and Sexual Activity    Alcohol use: Not Currently     Comment: very light social drinker, 1-2 wine or 1-2 beer, none 2016+    Drug use: No     Social Drivers of Health     Financial Resource Strain: Patient Declined  (7/22/2023)    Received from [employer]    Overall Financial Resource Strain (CARDIA)     Difficulty of Paying Living Expenses: Patient declined   Food Insecurity: Low Risk  (11/9/2024)    Received from [facility]    Food Insecurity     Have there been times that your food ran out, and you didn't have money to get more?: No     Are there times that you worry that this might happen?: No   Transportation Needs: Low Risk  (11/9/2024)    Received from [facility]    Transportation Needs     Do you have trouble getting transportation to medical appointments?: No   Physical Activity: Patient Declined (7/22/2023)    Received from [employer]    Exercise Vital Sign     Days of Exercise per Week: Patient declined     Minutes of Exercise per Session: Patient declined   Stress: Patient Declined (7/22/2023)    Received from [employer]    [place] [employer] - Occupational Stress Questionnaire     Feeling of Stress : Patient declined   Social Connections: Patient Declined (7/22/2023)    Received from [employer]    Social Connection and Isolation Panel [NHANES]     Frequency of Communication with Friends and Family: Patient declined     Frequency of Social Gatherings with Friends and Family: Patient declined     Attends Sikhism Services: Patient declined     Active Member of Clubs or Organizations: Patient declined     Attends Club or Organization Meetings: Patient declined     Marital Status: Patient declined   Housing Stability: Low Risk  (11/9/2024)    Received from [facility]    Housing Stability     Are you worried that your electric, gas, oil, or water might be shut off?: No     Are you concerned about having a safe and reliable place to live?: No       ALLERGIES:  Allergies[1]    CODE STATUS:  Full Code    CURRENT MEDICATIONS   See PCC    REVIEW OF SYSTEMS:  Review of Systems:   Constitutional: No fevers, chills,  fatigue or night sweats.  ENT: No mouth pain, neck pain, running nose, headaches or swollen glands.  Skin: No rashes, pruritus or skin changes,  Respiratory: Denies cough, wheezing or shortness of breath.  CV: Denies chest pain, palpitations, orthopnea, PND or dizziness.  Musculoskeletal: No joint pain, stiffness or swelling.  GI: No nausea, vomiting or diarrhea. No blood in stools.  Neurologic: No seizures, tremors, weakness or numbness    VITAL  Current Vitals   BP: 136/77 mmHg  12/13/2024 15:55    Temp:99   12/13/2024 15:55  Pulse:88 bpm  12/13/2024 15:55  Weight:213 Lbs  12/12/2024 11:44  Resp:18 Breaths/min  12/13/2024 15:55  BS:93 mg/dL  12/13/2024 05:53  O2:98 %  12/13/2024 15:55  Pain:0  12/11/2024 23:49    PHYSICAL EXAM:  GENERAL: well developed, well nourished, in no apparent distress  SKIN: no rashes, no suspicious lesions  LUNGS: clear to auscultation  CARDIO: RRR without murmur  GI: good BS's, no masses, HSM or tenderness  : deferred  RECTAL: deferred  MUSCULOSKELETAL: back is not tender, FROM of the back  EXTREMITIES: no cyanosis, clubbing or edema  NEURO: Oriented times three, cranial nerves are intact, motor and sensory are grossly intact      DIAGNOSTICS REVIEWED AT THIS VISIT:  Labs reviewed, his potassium has been running low  On 12/10/2024 potassium was 2.8, potassium was supplemented and was repeated on 2/11 and it went up to 3.1 his potassium was increased further labs are due for today  CBC from 12/10 shows a hemoglobin of 8.8 and hematocrit of 27.3 platelets 92    Assessment and plan  Hypokalemia--- continue potassium chloride 40 mg once a day, will wait for the lab results for today  Anemia-C last hemoglobin 8.8  Check CBC on Monday  Generalized weakness  - PT to work on ambulation, gait, balance, strength, endurance, transfers, safety  - OT to work on fine motor skills, ADLs, hygiene, toileting, transfers, safety  Hepatic encephalopathy (HCC)  On lactulose and rifaximin  Monitor bowel  movements  Portal hypertension (HCC)  With anemia and thrombocytopenia  Continue Bumex  Monitor hemoglobin, platelets, electrolytes  Frequent falls  PT/OT  Chronic bilateral low back pain without sciatica  Pain control with Tylenol  Check weekly CBCs    - 24h nursing for medication administration, bowel/bladder care, pain/sleep assessment  - Physician supervision for multiple medical comorbidities, fall risk, DVT risk, infection risk, pain management  - Psych for adjustment to disability and cognitive deficits    Gurpreet Willingham MD   [facility]  [address] [place] [address] [place] [id number]    Electronically signed      This dictation was performed with a verbal recognition program (DRAGON) and it was checked for errors. It is possible that there are still dictated errors within this office note. If so, please bring any errors to my attention for an addendum. All efforts were made to ensure that this office note is accurate          [1] No Known Allergies

## 2024-12-21 ENCOUNTER — MOBILE ENCOUNTER (OUTPATIENT)
Dept: FAMILY MEDICINE CLINIC | Facility: CLINIC | Age: 69
End: 2024-12-21

## 2024-12-21 ENCOUNTER — EXTERNAL FACILITY (OUTPATIENT)
Dept: FAMILY MEDICINE CLINIC | Facility: CLINIC | Age: 69
End: 2024-12-21

## 2024-12-21 DIAGNOSIS — D64.9 ANEMIA, UNSPECIFIED TYPE: ICD-10-CM

## 2024-12-21 DIAGNOSIS — K76.6 PORTAL HYPERTENSION (HCC): ICD-10-CM

## 2024-12-21 DIAGNOSIS — E87.6 HYPOKALEMIA: Primary | ICD-10-CM

## 2024-12-21 DIAGNOSIS — R29.6 FREQUENT FALLS: ICD-10-CM

## 2024-12-21 PROCEDURE — 99310 SBSQ NF CARE HIGH MDM 45: CPT | Performed by: FAMILY MEDICINE

## 2024-12-21 NOTE — PROGRESS NOTES
Joe Haney Author: Gurpreet Willingham MD     3/27/1955 MRN [id number]   Last Hospital  Admission      Last Hospital Discharge  PCP Peace Starks MD   Hospital of Discharge          CC--follow-up, hypokalemia    H.P.I Joe Haney is a 69 year old male who has been receiving subacute rehab at [facility], on 2024 was sent out to the emergency room at [facility] following persisting hypokalemia despite giving oral potassium 40 mEq twice a day  Patient is on Bumex 2 mg twice a day for his cirrhosis and recurrent edema  Patient was evaluated in the ER, he was given IV potassium chloride  With improvement in potassium, EKG was unremarkable  Hepatology service was consulted in the ER who recommended to take potassium 40 mEq 3-4 times a day  He was sent back to [address] [place] [address] on   With the recommendation to increase the potassium    Today patient is seen in his room, patient had labs done yesterday that showed his potassium was 3.0  His hemoglobin was also down at 7.6  Patient remains asymptomatic  He denies any chest pain shortness of breath dizziness headache nausea vomiting diarrhea abdominal pain      Wt Readings from Last 3 Encounters:   10/18/24 213 lb (96.6 kg)   24 227 lb 14.4 oz (103.4 kg)   24 224 lb (101.6 kg)     BP Readings from Last 3 Encounters:   10/18/24 127/73   24 113/64   24 116/66      Past Medical History:    Allergic rhinitis    Ragweed Pollen    Anxiety    Learning / catching up on age related medical issues.    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)    HE, Non bleeding varices    Neuropathy    hands and feet    Obesity    Osteoarthritis    Psoriasis    Sarcoidosis of lung (HCC)    Sleep apnea    Visual impairment    EYEGLASSES      Past Surgical History:   Procedure Laterality Date    Cataract Bilateral     IOL's    Colonoscopy N/A 10/18/2018    Procedure: COLONOSCOPY;  Surgeon: Jude Escamilla MD;  Location: [facility] ENDOSCOPY    Colonoscopy N/A 02/18/2019    Jude Escamilla MD;  Polyps (TA)   Repeat 2024    Colonoscopy  4/21/21= Polyps (TA)    Repeat 2024    Colonoscopy N/A 04/21/2021    Procedure: COLONOSCOPY with HOT SNARE POLYPECTOMY, ESOPHAGOGASTRODUODENOSCOPY (EGD);  Surgeon: Flip Corey MD;  Location:  ENDOSCOPY    Hip replacement surgery  Sept 2015    Right Hip    Skin surgery  09/07/2018    excision back melanoma     Tonsillectomy  1961    Total hip replacement Right 09/2015    Upper gi endoscopy performed  4/21/21= Grade 2 varices    Upper gi endoscopy,ligat varix  07/25/2023    Variceal bleed, banded in [place]    Vasectomy  1993     Family History   Problem Relation Age of Onset    Cancer Father         Melanoma    Other (Other) Mother         strokes    Cancer Mother         Colon Cancer    Dementia Mother         NOT Alzheimers, but cognitive impairment    Hypertension Mother         Hydrochlorothiazide    Other (Other) Daughter         ASTHMA    Other (Other) Son         ASTHMA    Anemia Brother         Uses Ferrous Sulphate 325mg    Asthma Brother     Cancer Brother         Colon Cancer & Squamus Cell skin cancer    Asthma Son     Obesity Son     Psychiatric Son         ADHD    Asthma Son     Asthma Daughter     Obesity Daughter     Cancer Sister         Melanoma     Social History     Socioeconomic History    Marital status:    Tobacco Use    Smoking status: Former     Current packs/day: 0.00     Average packs/day: 2.0 packs/day for 25.0 years (50.0 ttl pk-yrs)     Types: Cigarettes     Start date: 4/7/1989     Quit date: 4/7/2014     Years since quitting: 10.7    Smokeless tobacco: Never    Tobacco comments:     [age], light smoker, quit 26-43, medium smoker 43-58, 59+ quit smoking   Vaping Use    Vaping  status: Never Used   Substance and Sexual Activity    Alcohol use: Not Currently     Comment: very light social drinker, 1-2 wine or 1-2 beer, none 2016+    Drug use: No     Social Drivers of Health     Financial Resource Strain: Patient Declined (7/22/2023)    Received from [facility]    Overall Financial Resource Strain (CARDIA)     Difficulty of Paying Living Expenses: Patient declined   Food Insecurity: Low Risk  (11/9/2024)    Received from [facility]    Food Insecurity     Have there been times that your food ran out, and you didn't have money to get more?: No     Are there times that you worry that this might happen?: No   Transportation Needs: Low Risk  (11/9/2024)    Received from [facility]    Transportation Needs     Do you have trouble getting transportation to medical appointments?: No   Physical Activity: Patient Declined (7/22/2023)    Received from [employer]    Exercise Vital Sign     Days of Exercise per Week: Patient declined     Minutes of Exercise per Session: Patient declined   Stress: Patient Declined (7/22/2023)    Received from [facility]    [facility] of Occupational Health - Occupational Stress Questionnaire     Feeling of Stress : Patient declined   Social Connections: Patient Declined (7/22/2023)    Received from [employer]    Social Connection and Isolation Panel [NHANES]     Frequency of Communication with Friends and Family: Patient declined     Frequency of Social Gatherings with Friends and Family: Patient declined     Attends Mandaen Services: Patient declined     Active Member of Clubs or Organizations: Patient declined     Attends Club or Organization Meetings: Patient declined     Marital Status: Patient declined   Housing Stability: Low Risk  (11/9/2024)    Received from [facility]    Housing Stability     Are you worried that your electric, gas, oil, or water might be shut off?:  No     Are you concerned about having a safe and reliable place to live?: No       ALLERGIES:  Allergies[1]    CURRENT MEDICATIONS   [facility]    Review of Systems:   Constitutional: No fevers, chills, fatigue or night sweats.  ENT: No mouth pain, neck pain, running nose, headaches or swollen glands.  Skin: No rashes, pruritus or skin changes,  Respiratory: Denies cough, wheezing or shortness of breath.  CV: Denies chest pain, palpitations, orthopnea, PND or dizziness.  Musculoskeletal: No joint pain, stiffness or swelling.  GI: No nausea, vomiting or diarrhea. No blood in stools.  Neurologic: No seizures, tremors, weakness or numbness.    VITALS:  BP: 115/60 mmHg  12/21/2024 12:57  Temp:98.7 °F  12/21/2024 08:13  Pulse:80 bpm  12/21/2024 12:57  Weight:216.8 Lbs  12/19/2024 12:02  Resp:18 Breaths/min  12/21/2024 08:13  BS:128 mg/dL  12/19/2024 06:48  O2:97 %  12/21/2024 08:13  Pain:0  12/21/2024 06:53    Physical exam  GENERAL: well developed, well nourished, in no apparent distress  SKIN: no rashes, no suspicious lesions  LUNGS: clear to auscultation  CARDIO: RRR without murmur  GI: good BS's, no masses,   MUSCULOSKELETAL: back is not tender, FROM of the back  EXTREMITIES: Trace bilateral pedal edema  NEURO: Oriented times three, cranial nerves are intact, motor and sensory are grossly intact    ASSESSMENT AND PLAN:  Diagnoses and all orders for this visit:    Hypokalemia    Anemia, unspecified type    Portal hypertension (HCC)    Frequent falls      Patient is being discharged home  Family is concerned about erasmo COVID at the rehab  Patient is fairly stable  His labs although shows low potassium as well as hemoglobin  I had a long discussion with the patient and his wife regarding his abnormal labs possibly due to combination of his cirrhosis as well as use of Bumex that he needs  He is eager to go home  We are discharging him today, I ordered a CBC and CMP for Monday  He should be  following up with his PCP Dr. Starks next week  Please see the discharge notes for details on his medications    Time spent at appointment today is 40 minutes including preparing to see patient, reviewing test results, performing medically appropriate examination and evaluation and coordinating care, counseling and educating patient/family, ordering medications and testing, and documenting clinical information in EMR.     .    Gurpreet Willingham MD   [facility]  [address] [place] [place] [id number]    Electronically signed          [1] No Known Allergies

## 2024-12-23 ENCOUNTER — TELEPHONE (OUTPATIENT)
Dept: INTERNAL MEDICINE CLINIC | Facility: CLINIC | Age: 69
End: 2024-12-23

## 2024-12-23 NOTE — TELEPHONE ENCOUNTER
Received a call from Dotty MANJARREZ with [facility] requested a verbal order for an RN to go to patient's home and evaluate for Home health and Physical Therapy needs.    The phone number to the "backline for [facility] care is [phone]    Please advise

## 2024-12-23 NOTE — TELEPHONE ENCOUNTER
Advised Home health RNDotty of Dr Starks's  note.Dotty verbalized understanding and had no further questions.

## 2024-12-23 NOTE — TELEPHONE ENCOUNTER
Verified name and  of patient.    Wife of patient (on release of information) calling with condition updated- states that [facility] to [facility] emergency room for hypokalemia and low hemoglobin.     She states that provided advised that he be seen this week with PCP.    She is requesting that message be sent to Dr. Starks to request that patient be added to the schedule this week with Dr. Starks only.

## 2024-12-24 ENCOUNTER — TELEPHONE (OUTPATIENT)
Dept: INTERNAL MEDICINE CLINIC | Facility: CLINIC | Age: 69
End: 2024-12-24

## 2024-12-24 ENCOUNTER — OFFICE VISIT (OUTPATIENT)
Dept: INTERNAL MEDICINE CLINIC | Facility: CLINIC | Age: 69
End: 2024-12-24

## 2024-12-24 ENCOUNTER — LAB ENCOUNTER (OUTPATIENT)
Dept: LAB | Age: 69
End: 2024-12-24
Attending: INTERNAL MEDICINE
Payer: MEDICARE

## 2024-12-24 VITALS
WEIGHT: 233.13 LBS | DIASTOLIC BLOOD PRESSURE: 70 MMHG | TEMPERATURE: 98 F | BODY MASS INDEX: 38.84 KG/M2 | HEIGHT: 65 IN | HEART RATE: 82 BPM | SYSTOLIC BLOOD PRESSURE: 130 MMHG | OXYGEN SATURATION: 99 %

## 2024-12-24 DIAGNOSIS — D61.818 PANCYTOPENIA (HCC): ICD-10-CM

## 2024-12-24 DIAGNOSIS — E78.49 OTHER HYPERLIPIDEMIA: ICD-10-CM

## 2024-12-24 DIAGNOSIS — K76.6 PORTAL HYPERTENSION (HCC): ICD-10-CM

## 2024-12-24 DIAGNOSIS — N18.31 STAGE 3A CHRONIC KIDNEY DISEASE (HCC): ICD-10-CM

## 2024-12-24 DIAGNOSIS — N40.1 BPH W URINARY OBS/LUTS: ICD-10-CM

## 2024-12-24 DIAGNOSIS — D64.9 ANEMIA, UNSPECIFIED TYPE: ICD-10-CM

## 2024-12-24 DIAGNOSIS — K74.60 LIVER CIRRHOSIS SECONDARY TO NASH (HCC): ICD-10-CM

## 2024-12-24 DIAGNOSIS — K76.82 HEPATIC ENCEPHALOPATHY (HCC): Primary | ICD-10-CM

## 2024-12-24 DIAGNOSIS — E11.9 TYPE 2 DIABETES MELLITUS WITHOUT COMPLICATION, WITHOUT LONG-TERM CURRENT USE OF INSULIN (HCC): ICD-10-CM

## 2024-12-24 DIAGNOSIS — N13.8 BPH W URINARY OBS/LUTS: ICD-10-CM

## 2024-12-24 DIAGNOSIS — K75.81 LIVER CIRRHOSIS SECONDARY TO NASH (HCC): ICD-10-CM

## 2024-12-24 DIAGNOSIS — D50.8 OTHER IRON DEFICIENCY ANEMIA: ICD-10-CM

## 2024-12-24 DIAGNOSIS — D50.0 IRON DEFICIENCY ANEMIA DUE TO CHRONIC BLOOD LOSS: ICD-10-CM

## 2024-12-24 DIAGNOSIS — E87.6 HYPOKALEMIA: ICD-10-CM

## 2024-12-24 DIAGNOSIS — R79.89 ELEVATED SERUM CREATININE: ICD-10-CM

## 2024-12-24 DIAGNOSIS — F90.0 ATTENTION DEFICIT HYPERACTIVITY DISORDER (ADHD), PREDOMINANTLY INATTENTIVE TYPE: ICD-10-CM

## 2024-12-24 PROBLEM — N18.30 CKD (CHRONIC KIDNEY DISEASE) STAGE 3, GFR 30-59 ML/MIN (HCC): Chronic | Status: ACTIVE | Noted: 2024-12-24

## 2024-12-24 LAB
ALBUMIN SERPL-MCNC: 2.8 G/DL (ref 3.2–4.8)
ALBUMIN/GLOB SERPL: 0.8 {RATIO} (ref 1–2)
ALP LIVER SERPL-CCNC: 149 U/L
ALT SERPL-CCNC: 42 U/L
AMMONIA PLAS-MCNC: 90 UMOL/L (ref 11–32)
ANION GAP SERPL CALC-SCNC: 5 MMOL/L (ref 0–18)
AST SERPL-CCNC: 48 U/L (ref ?–34)
BASOPHILS # BLD AUTO: 0.02 X10(3) UL (ref 0–0.2)
BASOPHILS NFR BLD AUTO: 0.5 %
BILIRUB SERPL-MCNC: 0.8 MG/DL (ref 0.2–1.1)
BUN BLD-MCNC: 22 MG/DL (ref 9–23)
BUN/CREAT SERPL: 14.9 (ref 10–20)
CALCIUM BLD-MCNC: 9.5 MG/DL (ref 8.7–10.4)
CHLORIDE SERPL-SCNC: 108 MMOL/L (ref 98–112)
CO2 SERPL-SCNC: 29 MMOL/L (ref 21–32)
CREAT BLD-MCNC: 1.48 MG/DL
DEPRECATED HBV CORE AB SER IA-ACNC: 31 NG/ML
DEPRECATED RDW RBC AUTO: 66.2 FL (ref 35.1–46.3)
EGFRCR SERPLBLD CKD-EPI 2021: 51 ML/MIN/1.73M2 (ref 60–?)
EOSINOPHIL # BLD AUTO: 0.22 X10(3) UL (ref 0–0.7)
EOSINOPHIL NFR BLD AUTO: 5 %
ERYTHROCYTE [DISTWIDTH] IN BLOOD BY AUTOMATED COUNT: 18.6 % (ref 11–15)
FASTING STATUS PATIENT QL REPORTED: NO
GLOBULIN PLAS-MCNC: 3.5 G/DL (ref 2–3.5)
GLUCOSE BLD-MCNC: 120 MG/DL (ref 70–99)
HCT VFR BLD AUTO: 24.9 %
HGB BLD-MCNC: 8.2 G/DL
IMM GRANULOCYTES # BLD AUTO: 0.01 X10(3) UL (ref 0–1)
IMM GRANULOCYTES NFR BLD: 0.2 %
IRON SATN MFR SERPL: 12 %
IRON SERPL-MCNC: 34 UG/DL
LYMPHOCYTES # BLD AUTO: 0.43 X10(3) UL (ref 1–4)
LYMPHOCYTES NFR BLD AUTO: 9.8 %
MCH RBC QN AUTO: 31.8 PG (ref 26–34)
MCHC RBC AUTO-ENTMCNC: 32.9 G/DL (ref 31–37)
MCV RBC AUTO: 96.5 FL
MONOCYTES # BLD AUTO: 0.48 X10(3) UL (ref 0.1–1)
MONOCYTES NFR BLD AUTO: 11 %
NEUTROPHILS # BLD AUTO: 3.22 X10 (3) UL (ref 1.5–7.7)
NEUTROPHILS # BLD AUTO: 3.22 X10(3) UL (ref 1.5–7.7)
NEUTROPHILS NFR BLD AUTO: 73.5 %
OSMOLALITY SERPL CALC.SUM OF ELEC: 299 MOSM/KG (ref 275–295)
PLATELET # BLD AUTO: 80 10(3)UL (ref 150–450)
PLATELET MORPHOLOGY: NORMAL
PLATELETS.RETICULATED NFR BLD AUTO: 4.2 % (ref 0–7)
POTASSIUM SERPL-SCNC: 4.7 MMOL/L (ref 3.5–5.1)
PROT SERPL-MCNC: 6.3 G/DL (ref 5.7–8.2)
RBC # BLD AUTO: 2.58 X10(6)UL
SODIUM SERPL-SCNC: 142 MMOL/L (ref 136–145)
TIBC SERPL-MCNC: 286 UG/DL (ref 250–425)
TRANSFERRIN SERPL-MCNC: 192 MG/DL (ref 215–365)
WBC # BLD AUTO: 4.4 X10(3) UL (ref 4–11)

## 2024-12-24 PROCEDURE — 82140 ASSAY OF AMMONIA: CPT

## 2024-12-24 PROCEDURE — 1159F MED LIST DOCD IN RCRD: CPT | Performed by: INTERNAL MEDICINE

## 2024-12-24 PROCEDURE — 80053 COMPREHEN METABOLIC PANEL: CPT

## 2024-12-24 PROCEDURE — 1126F AMNT PAIN NOTED NONE PRSNT: CPT | Performed by: INTERNAL MEDICINE

## 2024-12-24 PROCEDURE — G2211 COMPLEX E/M VISIT ADD ON: HCPCS | Performed by: INTERNAL MEDICINE

## 2024-12-24 PROCEDURE — 36415 COLL VENOUS BLD VENIPUNCTURE: CPT

## 2024-12-24 PROCEDURE — 83540 ASSAY OF IRON: CPT

## 2024-12-24 PROCEDURE — 85025 COMPLETE CBC W/AUTO DIFF WBC: CPT

## 2024-12-24 PROCEDURE — 82728 ASSAY OF FERRITIN: CPT

## 2024-12-24 PROCEDURE — 99214 OFFICE O/P EST MOD 30 MIN: CPT | Performed by: INTERNAL MEDICINE

## 2024-12-24 PROCEDURE — 99499 UNLISTED E&M SERVICE: CPT | Performed by: INTERNAL MEDICINE

## 2024-12-24 PROCEDURE — 1160F RVW MEDS BY RX/DR IN RCRD: CPT | Performed by: INTERNAL MEDICINE

## 2024-12-24 PROCEDURE — 84466 ASSAY OF TRANSFERRIN: CPT

## 2024-12-24 RX ORDER — ATORVASTATIN CALCIUM 10 MG/1
10 TABLET, FILM COATED ORAL NIGHTLY
Qty: 90 TABLET | Refills: 1 | Status: SHIPPED | OUTPATIENT
Start: 2024-12-24

## 2024-12-24 NOTE — PROGRESS NOTES
Your potassium has improved to 4.7 now.  Continue your potassium at 40 mEq twice a day  Continue Bumex twice a day  Your hemoglobin has also improved to 8.2

## 2024-12-24 NOTE — TELEPHONE ENCOUNTER
Home health orders from [facility] received, reviewed and signed by . Will fax and send to scanning

## 2024-12-24 NOTE — TELEPHONE ENCOUNTER
Home health plan of care from [facility] received, reviewed and signed by . Will fax and send to scanning

## 2024-12-27 NOTE — PROGRESS NOTES
Subjective:     Patient ID: Joe Haney is a 69 year old male.  Presents for follow-up after recent hospitalization.    HPI  Patient reports that he was hospitalized at [facility] several weeks ago because of the encephalopathy, he is known not to be compliant with taking lactulose.  Patient fell in the driveway, he is not sure why he fell.  Workup in the emergency room did not show intracranial abnormality on CT scan of the brain, workup and hospital did not reveal any acute abnormality, he was found to have anemia which is chronic, iron studies were done recently shows iron deficiency.  Patient was treated at rehabilitation center, had episode of hypokalemia and he was taken to emergency room, potassium was replaced, currently he has been taking potassium chloride 40 mg twice a day.  During treatment in the hospital and preop Lexapro was discontinued, amiloride was discontinued as well.  He is planning to see hepatologist at [facility] in [place] prefers to be followed there.  He reports that blood sugar is normal, he is at home for about 4 days now and gradually getting in the correct regimen of medications.  He has home health services we will follow him undergoes physical therapy  Calvillo catheter was removed several weeks ago.  He has not followed up with urologist yet thinks that he urinates enough.  Last few days he has gained weight, leg edema worsened.  He is not physically active.  But denies shortness of breath or chest pain, he was found to have coronary artery disease which required stent placement     States that he has been feeling best in a long time, seems more alert.  Trying to be compliant with medications, wife present in the room she is supervising all his care    Current Outpatient Medications   Medication Sig Dispense Refill    atorvastatin 10 MG Oral Tab Take 1 tablet (10 mg total) by mouth nightly. 90 tablet 1    midodrine 5 MG Oral Tab Take 1 tablet (5  mg total) by mouth 3 (three) times daily. 90 tablet 0    Amphetamine-Dextroamphet ER (ADDERALL XR) 25 MG Oral Capsule SR 24 Hr Take 1 capsule (25 mg total) by mouth every morning. 30 capsule 0    Amphetamine-Dextroamphet ER (ADDERALL XR) 25 MG Oral Capsule SR 24 Hr Take 1 capsule (25 mg total) by mouth daily. 30 capsule 0    Repaglinide 2 MG Oral Tab Take 1 tablet (2 mg total) by mouth 3 (three) times daily before meals. 270 tablet 1    atorvastatin 10 MG Oral Tab Take 1 tablet (10 mg total) by mouth nightly.      carvedilol 3.125 MG Oral Tab Take 1 tablet (3.125 mg total) by mouth 2 (two) times daily with meals.      aspirin 81 MG Oral Tab EC Take 1 tablet (81 mg total) by mouth daily.      pantoprazole 40 MG Oral Tab EC Take 1 tablet (40 mg total) by mouth before breakfast. 90 tablet 3    tamsulosin 0.4 MG Oral Cap Take 2 capsules (0.8 mg total) by mouth daily. Take prior to bedtime 180 capsule 3    zinc sulfate 220 (50 Zn) MG Oral Cap Take 1 capsule (220 mg total) by mouth daily.      Ferrous Sulfate 325 (65 Fe) MG Oral Tab Take 1 tablet (325 mg total) by mouth daily.      lactulose 10 GM/15ML Oral Solution Take 30 mL (20 g total) by mouth as needed.      Potassium Chloride ER 20 MEQ Oral Tab CR TAKE 1 TABLET BY MOUTH TWICE DAILY FOR 2 DAYS. CONTINUE 1 TABLET DAILY THEREAFTER (Patient taking differently: Take 10 mEq by mouth in the morning, at noon, and at bedtime. TAKE 1 TABLET BY MOUTH TWICE DAILY FOR 2 DAYS. CONTINUE 1 TABLET DAILY THEREAFTER) 90 tablet 0    bumetanide 2 MG Oral Tab Take 0.5 tablets (1 mg total) by mouth 2 (two) times daily.      omega-3 fatty acids 1000 MG Oral Cap Take 1,000 mg by mouth at bedtime.      Multiple Vitamin (MULTIVITAMIN ADULT OR) Take 1 tablet by mouth daily.      Probiotic Product (PROBIOTIC-10 OR) Take 1 capsule by mouth daily.      Continuous Blood Gluc Sensor (FREESTYLE GOLD 3 SENSOR) Does not apply Misc 1 each daily. 1 each 11    rifaximin (XIFAXAN) 550 MG Oral Tab Take 1  tablet (550 mg total) by mouth 2 (two) times daily. 60 tablet 11    aMILoride 5 MG Oral Tab Take 2 tablets (10 mg total) by mouth 2 (two) times a day. The patient is to take 20mg per day 120 tablet 3    hydrocortisone 2.5 % External Cream as needed.      Clobetasol Propionate 0.05 % External Liquid Apply 125 mL topically As Directed. shampoo       Allergies:Allergies[1]    Past Medical History:    Allergic rhinitis    Ragweed Pollen    Anxiety    Learning / catching up on age related medical issues.    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)    HE, Non bleeding varices    Neuropathy    hands and feet    Obesity    Osteoarthritis    Psoriasis    Sarcoidosis of lung (HCC)    Sleep apnea    Visual impairment    EYEGLASSES      Past Surgical History:   Procedure Laterality Date    Cataract Bilateral     IOL's    Colonoscopy N/A 10/18/2018    Procedure: COLONOSCOPY;  Surgeon: Jude Escamilla MD;  Location: [facility] ENDOSCOPY    Colonoscopy N/A 02/18/2019    Jude Escamilla MD;  Polyps (TA)   Repeat 2024    Colonoscopy  4/21/21= Polyps (TA)    Repeat 2024    Colonoscopy N/A 04/21/2021    Procedure: COLONOSCOPY with HOT SNARE POLYPECTOMY, ESOPHAGOGASTRODUODENOSCOPY (EGD);  Surgeon: Flip Corey MD;  Location:  ENDOSCOPY    Hip replacement surgery  Sept 2015    Right Hip    Skin surgery  09/07/2018    excision back melanoma     Tonsillectomy  1961    Total hip replacement Right 09/2015    Upper gi endoscopy performed  4/21/21= Grade 2 varices    Upper gi endoscopy,lighafsa varix  07/25/2023    Variceal bleed, banded in [place]    Vasectomy  1993      Family History   Problem Relation Age of Onset    Cancer Father         Melanoma    Other (Other) Mother         strokes    Cancer Mother         Colon  Cancer    Dementia Mother         NOT Alzheimers, but cognitive impairment    Hypertension Mother         Hydrochlorothiazide    Other (Other) Daughter         ASTHMA    Other (Other) Son         ASTHMA    Anemia Brother         Uses Ferrous Sulphate 325mg    Asthma Brother     Cancer Brother         Colon Cancer & Squamus Cell skin cancer    Asthma Son     Obesity Son     Psychiatric Son         ADHD    Asthma Son     Asthma Daughter     Obesity Daughter     Cancer Sister         Melanoma      Social History:   Social History     Socioeconomic History    Marital status:    Tobacco Use    Smoking status: Former     Current packs/day: 0.00     Average packs/day: 2.0 packs/day for 25.0 years (50.0 ttl pk-yrs)     Types: Cigarettes     Start date: 4/7/1989     Quit date: 4/7/2014     Years since quitting: 10.7     Passive exposure: Past    Smokeless tobacco: Never    Tobacco comments:     16-26, light smoker, quit 26-43, medium smoker 43-58, 59+ quit smoking   Vaping Use    Vaping status: Never Used   Substance and Sexual Activity    Alcohol use: Not Currently     Comment: very light social drinker, 1-2 wine or 1-2 beer, none 2016+    Drug use: No     Social Drivers of Health     Financial Resource Strain: Patient Declined (7/22/2023)    Received from [employer]    Overall Financial Resource Strain (CARDIA)     Difficulty of Paying Living Expenses: Patient declined   Food Insecurity: Low Risk  (11/9/2024)    Received from [facility]    Food Insecurity     Have there been times that your food ran out, and you didn't have money to get more?: No     Are there times that you worry that this might happen?: No   Transportation Needs: Low Risk  (11/9/2024)    Received from [facility]    Transportation Needs     Do you have trouble getting transportation to medical appointments?: No   Physical Activity: Patient Declined (7/22/2023)    Received from [employer]     Exercise Vital Sign     Days of Exercise per Week: Patient declined     Minutes of Exercise per Session: Patient declined   Stress: Patient Declined (7/22/2023)    Received from [facility]    [place] of Occupational Health - Occupational Stress Questionnaire     Feeling of Stress : Patient declined   Social Connections: Patient Declined (7/22/2023)    Received from [employer]    Social Connection and Isolation Panel [NHANES]     Frequency of Communication with Friends and Family: Patient declined     Frequency of Social Gatherings with Friends and Family: Patient declined     Attends Mandaen Services: Patient declined     Active Member of Clubs or Organizations: Patient declined     Attends Club or Organization Meetings: Patient declined     Marital Status: Patient declined   Housing Stability: Low Risk  (11/9/2024)    Received from [facility]    Housing Stability     Are you worried that your electric, gas, oil, or water might be shut off?: No     Are you concerned about having a safe and reliable place to live?: No        /70 (BP Location: Left arm, Patient Position: Sitting, Cuff Size: adult)   Pulse 82   Temp 98.1 °F (36.7 °C)   Ht 5' 5\" (1.651 m)   Wt 233 lb 1.6 oz (105.7 kg)   SpO2 99%   BMI 38.79 kg/m²    Physical Exam  Constitutional:       Appearance: Normal appearance. He is obese.   HENT:      Head: Normocephalic and atraumatic.   Eyes:      General: No scleral icterus.     Extraocular Movements: Extraocular movements intact.      Conjunctiva/sclera: Conjunctivae normal.      Pupils: Pupils are equal, round, and reactive to light.   Neck:      Vascular: No carotid bruit.   Cardiovascular:      Rate and Rhythm: Normal rate and regular rhythm.      Heart sounds: No murmur heard.     No gallop.   Pulmonary:      Effort: Pulmonary effort is normal.      Breath sounds: No wheezing or rhonchi.   Abdominal:      General: There is no distension.       Palpations: Abdomen is soft. There is no mass.      Tenderness: There is no abdominal tenderness. There is no guarding or rebound.   Musculoskeletal:         General: Normal range of motion.      Cervical back: Normal range of motion.      Right lower leg: Edema (+2 edema below knee and slightly above the knee) present.      Left lower leg: Edema (+2 edema below knee and slightly above the knee) present.   Lymphadenopathy:      Cervical: No cervical adenopathy.   Skin:     General: Skin is warm.      Coloration: Skin is not jaundiced.   Neurological:      General: No focal deficit present.      Mental Status: He is alert and oriented to person, place, and time. Mental status is at baseline.      Gait: Gait normal.   Psychiatric:         Mood and Affect: Mood normal.         Behavior: Behavior normal.         Thought Content: Thought content normal.         Assessment & Plan:   1. Hepatic encephalopathy (HCC)    2. BPH w urinary obs/LUTS continue Flomax, advised patient to see urologist for evaluation since Calvillo catheter was removed   3. Pancytopenia (HCC) continue to monitor levels   4. Hepatic encephalopathy stable at present continue lactulose 30 g 3 times daily   5. Portal hypertension (HCC) stable clinically no treatment needed   6. Other hyperlipidemia check lipids continue atorvastatin   7. 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 Attention deficit hyperactivity disorder (ADHD), predominantly inattentive type    9. Type 2 diabetes mellitus without complication, without long-term current use of insulin (HCC)    10. Other iron deficiency anemia check iron studies replace iron if needed   11. Stage 3a chronic kidney disease (HCC) check kidney function test, push fluids at least 64 ounces of liquids a day       Orders Placed This Encounter   Procedures    CBC With Differential With Platelet    Comp Metabolic Panel (14)    Ammonia, Plasma [E]       Meds This Visit:  Requested Prescriptions     Signed  Prescriptions Disp Refills    atorvastatin 10 MG Oral Tab 90 tablet 1     Sig: Take 1 tablet (10 mg total) by mouth nightly.       Imaging & Referrals:  None            [1] No Known Allergies

## 2024-12-31 ENCOUNTER — TELEPHONE (OUTPATIENT)
Dept: INTERNAL MEDICINE CLINIC | Facility: CLINIC | Age: 69
End: 2024-12-31

## 2024-12-31 NOTE — TELEPHONE ENCOUNTER
Called [phone], spoke with Allison from answering service.  Gave message to have BMP collected.      This RN also called patient's home and left message on voicemail that he needs BMP drawn.

## 2024-12-31 NOTE — TELEPHONE ENCOUNTER
Call from Sandra MANJARREZ for [facility].  Patient was seen by Dr Starks 12/24/2024.  Patient was admitted to [facility] over the weekend.  Patient's wife was telling him that Dr Starks was going to order weekly labs.  Message routed to Dr Starks (who is not in office today) and to pod mate Kaylee SOUZA.  Please advise if he needs labs done weekly?

## 2025-01-02 NOTE — TELEPHONE ENCOUNTER
Follow up call placed to home health.  Transferred to nurse voice mail.  Left message asking to confirm receipt of basic metabolic panel order.  2nd message also left for patient.

## 2025-01-02 NOTE — TELEPHONE ENCOUNTER
Natalie from [facility] returned call and states patient will be seen today for lab draw.  Fax number [phone] provided.

## 2025-01-03 ENCOUNTER — TELEPHONE (OUTPATIENT)
Dept: FAMILY MEDICINE CLINIC | Facility: CLINIC | Age: 70
End: 2025-01-03

## 2025-01-03 ENCOUNTER — MED REC SCAN ONLY (OUTPATIENT)
Dept: FAMILY MEDICINE CLINIC | Facility: CLINIC | Age: 70
End: 2025-01-03

## 2025-01-03 NOTE — TELEPHONE ENCOUNTER
Laboratory results from [facility] received and left for  to review and send to scanning

## 2025-01-03 NOTE — TELEPHONE ENCOUNTER
Name and  verified. Patients wife called just wanting to let us know the labs were drawn by home health yesterday.

## 2025-01-04 ENCOUNTER — PATIENT MESSAGE (OUTPATIENT)
Dept: INTERNAL MEDICINE CLINIC | Facility: CLINIC | Age: 70
End: 2025-01-04

## 2025-01-06 ENCOUNTER — NURSE TRIAGE (OUTPATIENT)
Dept: INTERNAL MEDICINE CLINIC | Facility: CLINIC | Age: 70
End: 2025-01-06

## 2025-01-06 NOTE — TELEPHONE ENCOUNTER
Action Requested: Summary for Provider     []  Critical Lab, Recommendations Needed  [] Need Additional Advice  [x]   FYI    []   Need Orders  [] Need Medications Sent to Pharmacy  []  Other     SUMMARY: Per Protocol disposition advised to be seen in the ER for diarrhea and weakness.  Patient and spouse verbalized understanding and agrees to plan. Patient's wife will call 911 for assistance. Family declined help with the call.    Reason for call: Diarrhea  Onset: today    Patient's wife, Minnie (name and  verified) calling with symptoms of diarrhea after taking 4 doses of the lactulose. Family reports that patient is weak and not able to stand on his own.   Reason for Disposition   Patient sounds very sick or weak to the triager    Protocols used: Diarrhea-A-OH

## 2025-01-08 ENCOUNTER — TELEPHONE (OUTPATIENT)
Dept: INTERNAL MEDICINE CLINIC | Facility: CLINIC | Age: 70
End: 2025-01-08

## 2025-01-08 NOTE — TELEPHONE ENCOUNTER
Call from Jacinta MANJARREZ for [facility].  They have not been able to admit patient due to no response to their calls (patient, wife or son).  If he needs Home Health services, will  need to have new orders sent.  SARA Starks.    Future Appointments   Date Time Provider Department Center   1/17/2025  2:20 PM Peace Starks MD [facility]

## 2025-01-09 ENCOUNTER — MED REC SCAN ONLY (OUTPATIENT)
Dept: FAMILY MEDICINE CLINIC | Facility: CLINIC | Age: 70
End: 2025-01-09

## 2025-01-20 DIAGNOSIS — F90.0 ATTENTION DEFICIT HYPERACTIVITY DISORDER (ADHD), PREDOMINANTLY INATTENTIVE TYPE: ICD-10-CM

## 2025-01-20 RX ORDER — DEXTROAMPHETAMINE SACCHARATE, AMPHETAMINE ASPARTATE MONOHYDRATE, DEXTROAMPHETAMINE SULFATE AND AMPHETAMINE SULFATE 6.25; 6.25; 6.25; 6.25 MG/1; MG/1; MG/1; MG/1
25 CAPSULE, EXTENDED RELEASE ORAL DAILY
Qty: 30 CAPSULE | Refills: 0 | Status: SHIPPED | OUTPATIENT
Start: 2025-01-20 | End: 2025-02-19

## 2025-01-20 RX ORDER — DEXTROAMPHETAMINE SACCHARATE, AMPHETAMINE ASPARTATE MONOHYDRATE, DEXTROAMPHETAMINE SULFATE AND AMPHETAMINE SULFATE 6.25; 6.25; 6.25; 6.25 MG/1; MG/1; MG/1; MG/1
25 CAPSULE, EXTENDED RELEASE ORAL DAILY
Qty: 30 CAPSULE | Refills: 0 | Status: CANCELLED | OUTPATIENT
Start: 2025-01-20 | End: 2025-02-19

## 2025-01-20 RX ORDER — DEXTROAMPHETAMINE SACCHARATE, AMPHETAMINE ASPARTATE MONOHYDRATE, DEXTROAMPHETAMINE SULFATE AND AMPHETAMINE SULFATE 6.25; 6.25; 6.25; 6.25 MG/1; MG/1; MG/1; MG/1
25 CAPSULE, EXTENDED RELEASE ORAL DAILY
Qty: 30 CAPSULE | Refills: 0 | Status: CANCELLED | OUTPATIENT
Start: 2025-03-23 | End: 2025-04-22

## 2025-01-20 RX ORDER — DEXTROAMPHETAMINE SACCHARATE, AMPHETAMINE ASPARTATE MONOHYDRATE, DEXTROAMPHETAMINE SULFATE AND AMPHETAMINE SULFATE 6.25; 6.25; 6.25; 6.25 MG/1; MG/1; MG/1; MG/1
25 CAPSULE, EXTENDED RELEASE ORAL DAILY
Qty: 30 CAPSULE | Refills: 0 | Status: CANCELLED | OUTPATIENT
Start: 2025-02-20 | End: 2025-03-23

## 2025-01-20 NOTE — TELEPHONE ENCOUNTER
Please kindly review; protocol failed or medication has no protocol attached.   Medication request is marked high priority: patient's spouse states patient has 1 pill left.    Recent fill dates: 12/13/24 (prescribed by Dr. Jam Brand), 11/26/24, and 10/21/24  Date of  last written prescription: 12/13/24   Last written quantity: #30 each and processed as a 30 day supply  [] Takes as needed  [x] Takes scheduled daily    Recent Visits  Date Type Provider Dept   12/24/24 Office Visit Peace Starks MD [facility]     Future Appointments  None     Requested Prescriptions   Pending Prescriptions Disp Refills    Amphetamine-Dextroamphet ER (ADDERALL XR) 25 MG Oral Capsule SR 24 Hr 30 capsule 0     Sig: Take 1 capsule (25 mg total) by mouth daily.       Controlled Substance Medication Failed - 1/20/2025  1:48 PM        Failed - This medication is a controlled substance - forward to provider to refill        Passed - Medication is active on med list               Recent Outpatient Visits              3 weeks ago Hepatic encephalopathy (HCC)    [facility], [place] Kandel, Ninel, MD    Office Visit    3 months ago Hepatic encephalopathy (HCC)    [facility] [place] Kandel, Ninel, MD    Office Visit    3 months ago Other iron deficiency anemia    [facility] [place] Kandel, Ninel, MD    Office Visit    5 months ago Cellulitis of abdominal wall    [facility] [place] Kandel, Ninel, MD    Office Visit    7 months ago 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

## 2025-01-20 NOTE — TELEPHONE ENCOUNTER
Patient's spouse states that patient has one more tab of Adderall left. She is requesting the 3 month panel be sent like last time. Routing as high priority.

## 2025-01-24 RX ORDER — DEXTROAMPHETAMINE SACCHARATE, AMPHETAMINE ASPARTATE MONOHYDRATE, DEXTROAMPHETAMINE SULFATE AND AMPHETAMINE SULFATE 6.25; 6.25; 6.25; 6.25 MG/1; MG/1; MG/1; MG/1
25 CAPSULE, EXTENDED RELEASE ORAL EVERY MORNING
Qty: 30 CAPSULE | Refills: 0 | OUTPATIENT
Start: 2025-01-24

## 2025-01-27 ENCOUNTER — TELEPHONE (OUTPATIENT)
Dept: FAMILY MEDICINE CLINIC | Facility: CLINIC | Age: 70
End: 2025-01-27

## 2025-01-27 NOTE — TELEPHONE ENCOUNTER
Received forms from [facility] requesting 's signature, will leave for her to review and sign

## 2025-01-28 ENCOUNTER — TELEPHONE (OUTPATIENT)
Dept: INTERNAL MEDICINE CLINIC | Facility: CLINIC | Age: 70
End: 2025-01-28

## 2025-01-28 NOTE — TELEPHONE ENCOUNTER
Wife calling for Patient. Verified Patient's name an date of birth.  Wife states Patient is currently in the hospital for the 2nd time (altered mental status, liver) May be discharged this week.  Wife calling to schedule a follow up appointment.  Please advise if ok to schedule Patient 2/3/25 TCM slot or wait the following week 2/10/25?

## 2025-02-03 ENCOUNTER — OFFICE VISIT (OUTPATIENT)
Dept: INTERNAL MEDICINE CLINIC | Facility: CLINIC | Age: 70
End: 2025-02-03
Payer: MEDICARE

## 2025-02-03 VITALS
WEIGHT: 221 LBS | SYSTOLIC BLOOD PRESSURE: 108 MMHG | HEIGHT: 65 IN | HEART RATE: 76 BPM | OXYGEN SATURATION: 98 % | BODY MASS INDEX: 36.82 KG/M2 | DIASTOLIC BLOOD PRESSURE: 56 MMHG

## 2025-02-03 DIAGNOSIS — K76.82 HEPATIC ENCEPHALOPATHY (HCC): ICD-10-CM

## 2025-02-03 DIAGNOSIS — E11.9 TYPE 2 DIABETES MELLITUS WITHOUT COMPLICATION, WITHOUT LONG-TERM CURRENT USE OF INSULIN (HCC): ICD-10-CM

## 2025-02-03 DIAGNOSIS — E11.21 CONTROLLED TYPE 2 DIABETES MELLITUS WITH DIABETIC NEPHROPATHY, WITHOUT LONG-TERM CURRENT USE OF INSULIN (HCC): ICD-10-CM

## 2025-02-03 DIAGNOSIS — N18.31 STAGE 3A CHRONIC KIDNEY DISEASE (HCC): ICD-10-CM

## 2025-02-03 DIAGNOSIS — K74.60 LIVER CIRRHOSIS SECONDARY TO NASH (HCC): Primary | ICD-10-CM

## 2025-02-03 DIAGNOSIS — K75.81 LIVER CIRRHOSIS SECONDARY TO NASH (HCC): Primary | ICD-10-CM

## 2025-02-03 DIAGNOSIS — F90.0 ATTENTION DEFICIT HYPERACTIVITY DISORDER (ADHD), PREDOMINANTLY INATTENTIVE TYPE: ICD-10-CM

## 2025-02-03 PROCEDURE — 1159F MED LIST DOCD IN RCRD: CPT | Performed by: INTERNAL MEDICINE

## 2025-02-03 PROCEDURE — 1126F AMNT PAIN NOTED NONE PRSNT: CPT | Performed by: INTERNAL MEDICINE

## 2025-02-03 PROCEDURE — 99214 OFFICE O/P EST MOD 30 MIN: CPT | Performed by: INTERNAL MEDICINE

## 2025-02-03 PROCEDURE — 1111F DSCHRG MED/CURRENT MED MERGE: CPT | Performed by: INTERNAL MEDICINE

## 2025-02-03 PROCEDURE — 1160F RVW MEDS BY RX/DR IN RCRD: CPT | Performed by: INTERNAL MEDICINE

## 2025-02-07 ENCOUNTER — TELEPHONE (OUTPATIENT)
Dept: INTERNAL MEDICINE CLINIC | Facility: CLINIC | Age: 70
End: 2025-02-07

## 2025-02-07 NOTE — TELEPHONE ENCOUNTER
Beth from [facility] calling to follow up on plan of care, needs signature and to be faxed back.

## 2025-02-07 NOTE — TELEPHONE ENCOUNTER
Spoke with yane from [facility] in regarding to home health orders, she will fax over updated and current forms.

## 2025-02-07 NOTE — TELEPHONE ENCOUNTER
Please follow-up with home health see below I signed sign multiple home health papers for this patient in the past I am out of the office for the next few weeks, please ask Mikey Meredith if needed

## 2025-02-08 PROBLEM — E66.813 CLASS 3 SEVERE OBESITY DUE TO EXCESS CALORIES WITH SERIOUS COMORBIDITY AND BODY MASS INDEX (BMI) OF 40.0 TO 44.9 IN ADULT (HCC): Status: RESOLVED | Noted: 2024-02-19 | Resolved: 2025-02-08

## 2025-02-08 PROBLEM — E66.813 CLASS 3 SEVERE OBESITY DUE TO EXCESS CALORIES WITH SERIOUS COMORBIDITY AND BODY MASS INDEX (BMI) OF 40.0 TO 44.9 IN ADULT: Status: RESOLVED | Noted: 2024-02-19 | Resolved: 2025-02-08

## 2025-02-08 PROBLEM — E66.01 CLASS 3 SEVERE OBESITY DUE TO EXCESS CALORIES WITH SERIOUS COMORBIDITY AND BODY MASS INDEX (BMI) OF 40.0 TO 44.9 IN ADULT (HCC): Status: RESOLVED | Noted: 2024-02-19 | Resolved: 2025-02-08

## 2025-02-08 NOTE — PROGRESS NOTES
Subjective:     Patient ID: Joe Haney is a 69 year old male.  Presents for follow-up after recent hospitalization at [facility]    HPI  Patient is being treated for advanced liver cirrhosis status post TIPS, hepatic encephalopathy, BPH with urinary retention, anemia of chronic disease, coronary artery disease, hypertension, diabetes mellitus, anxiety and ADHD.  He developed worsening mental status changes after Aracely, was taking to emergency at [facility] and was admitted, found to be having encephalopathy due to liver disease, patient known to be noncompliant with taking lactulose, since discharge according to the wife who present in the room patient has been trying to take lactulose 4 times a day and it makes a big difference his mental status much more clear.  He remembers many things and follows directions on his treatments.  He will be following up with hepatology clinic.  I advised that all medication pertinent to liver hypertension diuretics etc. should come from a pathology.  He reports that ADHD is well-controlled on Adderall, he does not exhibit severe anxiety anymore so we discussed possibility of tapering off of escitalopram if he feels it is good timing to do so.  He is watching diet, maintains steady weight.  Seems he has no problem urinating since Calvillo catheter removed.  He has chronic iron deficiency anemia which seems stable lately with last hemoglobin 8.1 upon discharge from [facility], creatinine 1.41 which slightly elevated for him, he is on multiple diuretics which being regulated by hepatology clinic    Current Outpatient Medications   Medication Sig Dispense Refill    Amphetamine-Dextroamphet ER (ADDERALL XR) 25 MG Oral Capsule SR 24 Hr Take 1 capsule (25 mg total) by mouth daily. 30 capsule 0    atorvastatin 10 MG Oral Tab Take 1 tablet (10 mg total) by mouth nightly. 90 tablet 1    midodrine 5 MG Oral Tab Take 1 tablet (5 mg total) by mouth 3 (three) times  daily. 90 tablet 0    Amphetamine-Dextroamphet ER (ADDERALL XR) 25 MG Oral Capsule SR 24 Hr Take 1 capsule (25 mg total) by mouth every morning. 30 capsule 0    Repaglinide 2 MG Oral Tab Take 1 tablet (2 mg total) by mouth 3 (three) times daily before meals. 270 tablet 1    atorvastatin 10 MG Oral Tab Take 1 tablet (10 mg total) by mouth nightly.      carvedilol 3.125 MG Oral Tab Take 1 tablet (3.125 mg total) by mouth 2 (two) times daily with meals.      aspirin 81 MG Oral Tab EC Take 1 tablet (81 mg total) by mouth daily.      pantoprazole 40 MG Oral Tab EC Take 1 tablet (40 mg total) by mouth before breakfast. 90 tablet 3    tamsulosin 0.4 MG Oral Cap Take 2 capsules (0.8 mg total) by mouth daily. Take prior to bedtime 180 capsule 3    zinc sulfate 220 (50 Zn) MG Oral Cap Take 1 capsule (220 mg total) by mouth daily.      Ferrous Sulfate 325 (65 Fe) MG Oral Tab Take 1 tablet (325 mg total) by mouth daily.      lactulose 10 GM/15ML Oral Solution Take 30 mL (20 g total) by mouth as needed.      Potassium Chloride ER 20 MEQ Oral Tab CR TAKE 1 TABLET BY MOUTH TWICE DAILY FOR 2 DAYS. CONTINUE 1 TABLET DAILY THEREAFTER (Patient taking differently: Take 10 mEq by mouth in the morning, at noon, and at bedtime. TAKE 1 TABLET BY MOUTH TWICE DAILY FOR 2 DAYS. CONTINUE 1 TABLET DAILY THEREAFTER) 90 tablet 0    bumetanide 2 MG Oral Tab Take 0.5 tablets (1 mg total) by mouth 2 (two) times daily.      omega-3 fatty acids 1000 MG Oral Cap Take 1,000 mg by mouth at bedtime.      Multiple Vitamin (MULTIVITAMIN ADULT OR) Take 1 tablet by mouth daily.      Probiotic Product (PROBIOTIC-10 OR) Take 1 capsule by mouth daily.      Continuous Blood Gluc Sensor (FREESTYLE GOLD 3 SENSOR) Does not apply Misc 1 each daily. 1 each 11    rifaximin (XIFAXAN) 550 MG Oral Tab Take 1 tablet (550 mg total) by mouth 2 (two) times daily. 60 tablet 11    aMILoride 5 MG Oral Tab Take 2 tablets (10 mg total) by mouth 2 (two) times a day. The patient is  to take 20mg per day 120 tablet 3    hydrocortisone 2.5 % External Cream as needed.      Clobetasol Propionate 0.05 % External Liquid Apply 125 mL topically As Directed. shampoo       Allergies:Allergies[1]    Past Medical History:    Allergic rhinitis    Ragweed Pollen    Anxiety    Learning / catching up on age related medical issues.    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)    HE, Non bleeding varices    Neuropathy    hands and feet    Obesity    Osteoarthritis    Psoriasis    Sarcoidosis of lung (HCC)    Sleep apnea    Visual impairment    EYEGLASSES      Past Surgical History:   Procedure Laterality Date    Cataract Bilateral     IOL's    Colonoscopy N/A 10/18/2018    Procedure: COLONOSCOPY;  Surgeon: Jude Escamilla MD;  Location: [facility] ENDOSCOPY    Colonoscopy N/A 02/18/2019    Jude Escamilla MD;  Polyps (TA)   Repeat 2024    Colonoscopy  4/21/21= Polyps (TA)    Repeat 2024    Colonoscopy N/A 04/21/2021    Procedure: COLONOSCOPY with HOT SNARE POLYPECTOMY, ESOPHAGOGASTRODUODENOSCOPY (EGD);  Surgeon: Flip Corey MD;  Location:  ENDOSCOPY    Hip replacement surgery  Sept 2015    Right Hip    Skin surgery  09/07/2018    excision back melanoma     Tonsillectomy  1961    Total hip replacement Right 09/2015    Upper gi endoscopy performed  4/21/21= Grade 2 varices    Upper gi endoscopy,ligat varix  07/25/2023    Variceal bleed, banded in [place]    Vasectomy  1993      Family History   Problem Relation Age of Onset    Cancer Father         Melanoma    Other (Other) Mother         strokes    Cancer Mother         Colon Cancer    Dementia Mother         NOT Alzheimers, but cognitive impairment    Hypertension Mother         Hydrochlorothiazide    Other (Other) Daughter         ASTHMA     Other (Other) Son         ASTHMA    Anemia Brother         Uses Ferrous Sulphate 325mg    Asthma Brother     Cancer Brother         Colon Cancer & Squamus Cell skin cancer    Asthma Son     Obesity Son     Psychiatric Son         ADHD    Asthma Son     Asthma Daughter     Obesity Daughter     Cancer Sister         Melanoma      Social History:   Social History     Socioeconomic History    Marital status:    Tobacco Use    Smoking status: Former     Current packs/day: 0.00     Average packs/day: 2.0 packs/day for 25.0 years (50.0 ttl pk-yrs)     Types: Cigarettes     Start date: 4/7/1989     Quit date: 4/7/2014     Years since quitting: 10.8     Passive exposure: Past    Smokeless tobacco: Never    Tobacco comments:     [age], light smoker, quit [age], medium smoker [age], 59+ quit smoking   Vaping Use    Vaping status: Never Used   Substance and Sexual Activity    Alcohol use: Not Currently     Comment: very light social drinker, 1-2 wine or 1-2 beer, none 2016+    Drug use: No     Social Drivers of Health     Food Insecurity: Low Risk  (1/26/2025)    Received from [facility]    Food Insecurity     Have there been times that your food ran out, and you didn't have money to get more?: No     Are there times that you worry that this might happen?: No   Transportation Needs: Low Risk  (1/26/2025)    Received from [facility]    Transportation Needs     Do you have trouble getting transportation to medical appointments?: No   Stress: Patient Declined (7/22/2023)    Received from [facility]    [employer] [place] [employer] - Occupational Stress Questionnaire     Feeling of Stress : Patient declined   Housing Stability: Low Risk  (1/26/2025)    Received from [facility]    Housing Stability     Are you worried that your electric, gas, oil, or water might be shut off?: No     Are you concerned about having a safe and reliable place to  live?: No        /56 (BP Location: Left arm, Patient Position: Sitting, Cuff Size: large)   Pulse 76   Ht 5' 5\" (1.651 m)   Wt 221 lb (100.2 kg)   SpO2 98%   BMI 36.78 kg/m²    Physical Exam  Constitutional:       General: He is not in acute distress.     Appearance: Normal appearance. He is obese.   Cardiovascular:      Rate and Rhythm: Normal rate and regular rhythm.      Heart sounds: No murmur heard.     No gallop.   Musculoskeletal:         General: Normal range of motion.      Cervical back: Normal range of motion and neck supple.      Right lower leg: Edema (Mild 1 edema from mid calf down) present.      Left lower leg: Edema (Mild edema from mid calf down dry skin) present.   Neurological:      General: No focal deficit present.      Mental Status: He is alert and oriented to person, place, and time. Mental status is at baseline.      Cranial Nerves: No cranial nerve deficit.      Gait: Gait (Unsteady gait) normal.      Deep Tendon Reflexes: Reflexes normal.         Assessment & Plan:   1. 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 Hepatic encephalopathy (HCC) reiterated importance of use lactulose regularly   3. Type 2 diabetes mellitus without complication, without long-term current use of insulin (HCC) stable continue Prandin at present dose   4. Attention deficit hyperactivity disorder (ADHD), predominantly inattentive type controlled on Adderall we will continue medical   5.      Anemia of chronic disease patient scheduled for EGD advised that aspirin needs to be discontinued 7 days prior to procedure, discussed with cardiology importance of being on aspirin puts him at risk for GI bleed    No orders of the defined types were placed in this encounter.      Meds This Visit:  Requested Prescriptions      No prescriptions requested or ordered in this encounter       Imaging & Referrals:  None    Follow-up in 3 to 6 months         [1] No Known Allergies

## 2025-02-12 NOTE — TELEPHONE ENCOUNTER
Patient comment from [email or web address] Message:  Potassium Chloride ER 40 mg twice daily. Filled by hospitalist Rochelle Crandall at [facility]. Dosage increased by hospitalist due to low Potassium numbers. Need refill.     Recent admit notes: 1/26/25 \"He states he was not taking the potassium supplement packets as instructed because he didn't like the taste, he is agreeable to taking potassium supplement tablets.    Listed as Medication Prior To Admission:  potassium chloride 20 mEq tablet extended release   Indications: hypokalemia prevention Take 2 tablets by mouth 2 (two) times daily. Indications: prevention of low potassium in the blood       12/21/2024       Last prescription filled at [facility] pharmacy 10/12/2024, potassium 20 mEq for #30 for 30-day supply, written by Dr. Sav Mcwilliams.      Patient was given potassium while inpatient. I cannot find that a prescription was given/sent for patient to continue use after discharge.    Patient had follow-up with Dr. Starks 2/3/2025, potassium was marked as: Patient taking differently: 10mEq 3 times daily  Potassium last written by us 1/4/2024

## 2025-02-13 NOTE — TELEPHONE ENCOUNTER
Please call patient / spouse to discuss potassium request.  Did they receive prescription to use beyond recent hospital visit?

## 2025-02-14 NOTE — TELEPHONE ENCOUNTER
[phone] [no detailed message indicated on last signed verbal release/also no boxes checked for any other individuals, including spouse]--> Left message to call back; [employer] message sent requesting a call back [2nd attempt]    RN Triage - please check if patient read [employer] message, if not please send no response letter

## 2025-02-17 RX ORDER — POTASSIUM CHLORIDE 1500 MG/1
TABLET, EXTENDED RELEASE ORAL
Qty: 90 TABLET | Refills: 0 | OUTPATIENT
Start: 2025-02-17

## 2025-02-17 NOTE — TELEPHONE ENCOUNTER
Per Care Everywhere update, patient appears to be receiving home health services through [facility].     Left message to call office back on provider line with hours - needing to coordinate care for this patient.

## 2025-02-17 NOTE — TELEPHONE ENCOUNTER
Call received from Cristy with [facility].   Cristy can see documentation between Dr. Joe Aldridge ([facility]) and Dr. Rudi Kauffman ([facility]) over the weekend.     A new prescription has already been sent in for patient's potassium, due to recent weight gain.    RN called [facility] #[id number], spoke to Sherif at the call center. Sherif confirmed they have received a new prescription from Dr. Aldridge on 2/16/25 for patient's potassium.     RN cancelled this refill request.     RN called and spoke to wife Minnie - informed potassium has been filled by Dr. Aldridge.   Minnie verbalized understanding, she will contact [facility] to get this filled.

## 2025-02-24 ENCOUNTER — TELEPHONE (OUTPATIENT)
Dept: INTERNAL MEDICINE CLINIC | Facility: CLINIC | Age: 70
End: 2025-02-24

## 2025-02-24 DIAGNOSIS — F90.0 ATTENTION DEFICIT HYPERACTIVITY DISORDER (ADHD), PREDOMINANTLY INATTENTIVE TYPE: ICD-10-CM

## 2025-02-24 NOTE — TELEPHONE ENCOUNTER
Received confirmation fax # [phone] [facility]. Frequency change / Physical therapy.

## 2025-02-24 NOTE — TELEPHONE ENCOUNTER
Beth from [facility] stated form received but the Provider need to sign and  put \"on behalf of Dr Starks\"

## 2025-02-25 RX ORDER — DEXTROAMPHETAMINE SACCHARATE, AMPHETAMINE ASPARTATE MONOHYDRATE, DEXTROAMPHETAMINE SULFATE AND AMPHETAMINE SULFATE 6.25; 6.25; 6.25; 6.25 MG/1; MG/1; MG/1; MG/1
25 CAPSULE, EXTENDED RELEASE ORAL EVERY MORNING
Qty: 30 CAPSULE | Refills: 0 | Status: SHIPPED | OUTPATIENT
Start: 2025-02-25

## 2025-02-25 NOTE — TELEPHONE ENCOUNTER
Please review; protocol failed/No Protocol    Routing to covering provider Dr. Starks is out of office- patient requesting that  LIBBY Johnson fills for patient.     Patient states he is out and wants it ASAP-routed as high priority     Recent Fills: 11/26/2024-30 day supply, 12/13/2024-written by Dr. Jam Brand for 15 day supply, 01/20/2025-30 day supply    Last Rx Written: 01/20/2025-Dr. Starks    Last Office Visit: 02/03/2025

## 2025-02-25 NOTE — TELEPHONE ENCOUNTER
Wife ( release of information ) called and would like to get the Adderal prescription ASAP.   Patient has only 1 pill left ,preferred pharmacy is [facility] in [place] .   She is aware that DR Starks is NOT in the office, requesting LIBBY Curtis, to refill this medication.   SENT AS HIGH PRIORITY     Wife is also requesting a call back for updates .

## 2025-02-27 ENCOUNTER — TELEPHONE (OUTPATIENT)
Dept: INTERNAL MEDICINE CLINIC | Facility: CLINIC | Age: 70
End: 2025-02-27

## 2025-02-27 NOTE — TELEPHONE ENCOUNTER
[facility]   [phone]     They faxed Dr Starks 5 home health care orders; faxed on 2-20 and 2-24 and some yesterday.    Can covering pcp sign them all and put on there covering for Dr Peace Starks.

## 2025-02-27 NOTE — TELEPHONE ENCOUNTER
Called Sarah[facility] [place] , no answer. Left voice message, patients name and  provided, if she can re-fax home health orders as looks like on  a from was re-faxed but sent to [facility]. Will await for order and contact Sarah tomorrow if not received.

## 2025-02-28 PROCEDURE — G0180 MD CERTIFICATION HHA PATIENT: HCPCS | Performed by: INTERNAL MEDICINE

## 2025-02-28 NOTE — TELEPHONE ENCOUNTER
[facility] plan of care received 02/03/2025.    Reviewed and completed by covering provider. Faxed, confirmation received.

## 2025-03-03 NOTE — TELEPHONE ENCOUNTER
Sarah is asking for the covering PCP to re sign the paperwork and to also print their name.     Please also state 'on behalf of Dr. Starks.'    Please refax:  [phone]

## 2025-03-04 ENCOUNTER — TELEPHONE (OUTPATIENT)
Dept: INTERNAL MEDICINE CLINIC | Facility: CLINIC | Age: 70
End: 2025-03-04

## 2025-03-04 RX ORDER — MIDODRINE HYDROCHLORIDE 5 MG/1
5 TABLET ORAL 3 TIMES DAILY
Qty: 90 TABLET | Refills: 0 | Status: SHIPPED | OUTPATIENT
Start: 2025-03-04

## 2025-03-04 NOTE — TELEPHONE ENCOUNTER
Please review. Protocol Failed; No Protocol    Future Appointments   Date Time Provider Department Center   3/13/2025  2:00 PM Kaylee Johnson APRN [id number]  [place]

## 2025-03-13 ENCOUNTER — OFFICE VISIT (OUTPATIENT)
Dept: INTERNAL MEDICINE CLINIC | Facility: CLINIC | Age: 70
End: 2025-03-13

## 2025-03-13 VITALS
BODY MASS INDEX: 39.15 KG/M2 | DIASTOLIC BLOOD PRESSURE: 68 MMHG | SYSTOLIC BLOOD PRESSURE: 114 MMHG | WEIGHT: 235 LBS | HEART RATE: 70 BPM | OXYGEN SATURATION: 96 % | TEMPERATURE: 99 F | HEIGHT: 65 IN

## 2025-03-13 DIAGNOSIS — J84.10 LUNG GRANULOMA (HCC): ICD-10-CM

## 2025-03-13 DIAGNOSIS — C61 MALIGNANT NEOPLASM OF PROSTATE (HCC): ICD-10-CM

## 2025-03-13 DIAGNOSIS — K74.60 LIVER CIRRHOSIS SECONDARY TO NASH (HCC): ICD-10-CM

## 2025-03-13 DIAGNOSIS — E11.9 TYPE 2 DIABETES MELLITUS WITHOUT COMPLICATION, WITHOUT LONG-TERM CURRENT USE OF INSULIN (HCC): Primary | ICD-10-CM

## 2025-03-13 DIAGNOSIS — R93.89 ABNORMAL CT OF THE CHEST: ICD-10-CM

## 2025-03-13 DIAGNOSIS — K75.81 LIVER CIRRHOSIS SECONDARY TO NASH (HCC): ICD-10-CM

## 2025-03-13 DIAGNOSIS — K76.82 HEPATIC ENCEPHALOPATHY (HCC): ICD-10-CM

## 2025-03-13 PROCEDURE — 1126F AMNT PAIN NOTED NONE PRSNT: CPT | Performed by: NURSE PRACTITIONER

## 2025-03-13 PROCEDURE — 1160F RVW MEDS BY RX/DR IN RCRD: CPT | Performed by: NURSE PRACTITIONER

## 2025-03-13 PROCEDURE — 1111F DSCHRG MED/CURRENT MED MERGE: CPT | Performed by: NURSE PRACTITIONER

## 2025-03-13 PROCEDURE — 1159F MED LIST DOCD IN RCRD: CPT | Performed by: NURSE PRACTITIONER

## 2025-03-13 PROCEDURE — 99214 OFFICE O/P EST MOD 30 MIN: CPT | Performed by: NURSE PRACTITIONER

## 2025-03-13 NOTE — PROGRESS NOTES
Joe Haney is a 69 year old male.  HPI:   Pt is a 68 y/o male with history of portal HTN, pancytopenia, lung granuloma, liver cirrhosis secondary to GARCIA, esophageal varices in cirrhosis reports completing colonoscopy at [facility] 12/2023 and rpeorts he follows regularly with GI.   Hepatic encephalopathy: taking lactulose, no new or worsening sx, f/u with GI/Hepatology at [facility].   Current Outpatient Medications   Medication Sig Dispense Refill    midodrine 5 MG Oral Tab Take 1 tablet (5 mg total) by mouth 3 (three) times daily. 90 tablet 0    Amphetamine-Dextroamphet ER 25 MG Oral Capsule SR 24 Hr Take 1 capsule (25 mg total) by mouth every morning. 30 capsule 0    atorvastatin 10 MG Oral Tab Take 1 tablet (10 mg total) by mouth nightly. 90 tablet 1    Repaglinide 2 MG Oral Tab Take 1 tablet (2 mg total) by mouth 3 (three) times daily before meals. 270 tablet 1    atorvastatin 10 MG Oral Tab Take 1 tablet (10 mg total) by mouth nightly.      carvedilol 3.125 MG Oral Tab Take 1 tablet (3.125 mg total) by mouth 2 (two) times daily with meals.      aspirin 81 MG Oral Tab EC Take 1 tablet (81 mg total) by mouth daily.      pantoprazole 40 MG Oral Tab EC Take 1 tablet (40 mg total) by mouth before breakfast. 90 tablet 3    tamsulosin 0.4 MG Oral Cap Take 2 capsules (0.8 mg total) by mouth daily. Take prior to bedtime 180 capsule 3    zinc sulfate 220 (50 Zn) MG Oral Cap Take 1 capsule (220 mg total) by mouth daily.      Ferrous Sulfate 325 (65 Fe) MG Oral Tab Take 1 tablet (325 mg total) by mouth daily.      lactulose 10 GM/15ML Oral Solution Take 30 mL (20 g total) by mouth as needed.      Potassium Chloride ER 20 MEQ Oral Tab CR TAKE 1 TABLET BY MOUTH TWICE DAILY FOR 2 DAYS. CONTINUE 1 TABLET DAILY THEREAFTER (Patient taking differently: Take 10 mEq by mouth in the morning, at noon, and at bedtime. TAKE 1 TABLET BY MOUTH TWICE DAILY FOR 2 DAYS. CONTINUE 1 TABLET DAILY THEREAFTER) 90 tablet 0     bumetanide 2 MG Oral Tab Take 0.5 tablets (1 mg total) by mouth 2 (two) times daily.      omega-3 fatty acids 1000 MG Oral Cap Take 1,000 mg by mouth at bedtime.      Multiple Vitamin (MULTIVITAMIN ADULT OR) Take 1 tablet by mouth daily.      Probiotic Product (PROBIOTIC-10 OR) Take 1 capsule by mouth daily.      Continuous Blood Gluc Sensor (FREESTYLE GOLD 3 SENSOR) Does not apply Misc 1 each daily. 1 each 11    rifaximin (XIFAXAN) 550 MG Oral Tab Take 1 tablet (550 mg total) by mouth 2 (two) times daily. 60 tablet 11    aMILoride 5 MG Oral Tab Take 2 tablets (10 mg total) by mouth 2 (two) times a day. The patient is to take 20mg per day 120 tablet 3    hydrocortisone 2.5 % External Cream as needed.      Clobetasol Propionate 0.05 % External Liquid Apply 125 mL topically As Directed. shampoo        Past Medical History:    Allergic rhinitis    Ragweed Pollen    Anxiety    Learning / catching up on age related medical issues.    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)    HE, Non bleeding varices    Neuropathy    hands and feet    Obesity    Osteoarthritis    Psoriasis    Sarcoidosis of lung (HCC)    Sleep apnea    Visual impairment    EYEGLASSES      Social History:  Social History     Socioeconomic History    Marital status:    Tobacco Use    Smoking status: Former     Current packs/day: 0.00     Average packs/day: 2.0 packs/day for 25.0 years (50.0 ttl pk-yrs)     Types: Cigarettes     Start date: 4/7/1989     Quit date: 4/7/2014     Years since quitting: 10.9     Passive exposure: Past    Smokeless tobacco: Never    Tobacco comments:     [age], light smoker, quit [age], medium smoker 43-58, 59+ quit smoking   Vaping Use    Vaping status: Never Used   Substance and Sexual Activity     Alcohol use: Not Currently     Comment: very light social drinker, 1-2 wine or 1-2 beer, none 2016+    Drug use: No     Social Drivers of Health     Food Insecurity: Low Risk  (3/12/2025)    Received from [facility]    Food Insecurity     Have there been times that your food ran out, and you didn't have money to get more?: No     Are there times that you worry that this might happen?: No   Transportation Needs: Low Risk  (3/12/2025)    Received from [facility]    Transportation Needs     Do you have trouble getting transportation to medical appointments?: No   Stress: Patient Declined (7/22/2023)    Received from [facility]    [facility] of Occupational Health - Occupational Stress Questionnaire     Feeling of Stress : Patient declined   Housing Stability: Low Risk  (3/12/2025)    Received from [facility]    Housing Stability     Are you worried that your electric, gas, oil, or water might be shut off?: No     Are you concerned about having a safe and reliable place to live?: No        REVIEW OF SYSTEMS:   Review of Systems   Constitutional:  Negative for activity change, appetite change, chills, diaphoresis, fatigue, fever and unexpected weight change.   HENT: Negative.     Eyes:  Negative for photophobia and visual disturbance.   Respiratory:  Negative for cough, chest tightness, shortness of breath and wheezing.    Cardiovascular:  Positive for leg swelling. Negative for chest pain and palpitations.   Gastrointestinal:  Negative for abdominal distention, abdominal pain, blood in stool, constipation, diarrhea, nausea and vomiting.   Genitourinary:  Negative for dysuria.   Musculoskeletal:  Negative for arthralgias and back pain.   Skin: Negative.    Neurological:  Negative for dizziness, syncope, weakness, light-headedness and headaches.   Psychiatric/Behavioral:  Negative for dysphoric mood. The patient is not nervous/anxious.            EXAM:   /68   Pulse 70   Temp 98.5 °F (36.9 °C) (Temporal)   Ht 5' 5\" (1.651 m)   Wt 235 lb (106.6 kg)   SpO2 96%   BMI 39.11 kg/m²     Physical Exam  Vitals reviewed.   Constitutional:       General: He is not in acute distress.     Appearance: Normal appearance. He is obese.   HENT:      Head: Normocephalic.   Eyes:      General: No scleral icterus.     Conjunctiva/sclera: Conjunctivae normal.   Cardiovascular:      Rate and Rhythm: Normal rate and regular rhythm.      Pulses: Normal pulses.      Heart sounds: Normal heart sounds.   Pulmonary:      Effort: Pulmonary effort is normal. No respiratory distress.      Breath sounds: Normal breath sounds.   Musculoskeletal:      Right lower leg: Edema present.      Left lower leg: Edema present.   Skin:     General: Skin is warm.      Coloration: Skin is not jaundiced.   Neurological:      General: No focal deficit present.      Mental Status: He is alert and oriented to person, place, and time.   Psychiatric:         Mood and Affect: Mood normal.         Behavior: Behavior normal.         Thought Content: Thought content normal.         Judgment: Judgment normal.            ASSESSMENT AND PLAN:     Assessment & Plan  Type 2 diabetes mellitus without complication, without long-term current use of insulin (HCC)  -A1c/microalb due.  -Pt requests referral to Endocrinologist.   Orders:    Hemoglobin A1C [E]; Future    Microalb/Creat Ratio, Random Urine [E]; Future    Endocrine Referral - In Network    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 (HCC)  -CPM, f/u with hepatologist. Reviewed use of lactulose.        Hepatic encephalopathy (HCC)  -CPM, f/u with hepatologist.  Reviewed use of lactulose.       Abnormal CT of the chest  Repeat CT chest.   CT Chest 2024: nodular pulmonary opacities in the left lower lobe were seen and advised repeat imaging.   Orders:    CT CHEST(CONTRAST ONLY) (CPT=71260); Future    Lung granuloma (HCC)  CT chest ordered      Malignant  neoplasm of prostate (HCC)  Pt f/u with Urology            The patient indicates understanding of these issues and agrees to the plan.  The patient is asked to return in 4-6 weeks with PCP.     The above note was creating using Dragon speech recognition technology. Please excuse any typos.

## 2025-03-13 NOTE — ASSESSMENT & PLAN NOTE
-A1c/microalb due.  -Pt requests referral to Endocrinologist.   Orders:    Hemoglobin A1C [E]; Future    Microalb/Creat Ratio, Random Urine [E]; Future    Endocrine Referral - In Network

## 2025-03-15 ENCOUNTER — TELEPHONE (OUTPATIENT)
Dept: INTERNAL MEDICINE CLINIC | Facility: CLINIC | Age: 70
End: 2025-03-15

## 2025-03-15 NOTE — TELEPHONE ENCOUNTER
Home health certification Order # [id number]  rec'd back and signed. For [facility] home health and hospice  Fax # [phone]   Fax confirmation rec'd.

## 2025-03-17 ENCOUNTER — TELEPHONE (OUTPATIENT)
Dept: INTERNAL MEDICINE CLINIC | Facility: CLINIC | Age: 70
End: 2025-03-17

## 2025-03-18 NOTE — TELEPHONE ENCOUNTER
Home health certification Order # [id number]  rec'd back and signed. For [facility] home health and hospice  Fax # [phone]   Fax confirmation rec'd.

## 2025-03-20 RX ORDER — MIDODRINE HYDROCHLORIDE 5 MG/1
5 TABLET ORAL 3 TIMES DAILY
Qty: 90 TABLET | Refills: 0 | OUTPATIENT
Start: 2025-03-20

## 2025-03-24 ENCOUNTER — TELEPHONE (OUTPATIENT)
Dept: INTERNAL MEDICINE CLINIC | Facility: CLINIC | Age: 70
End: 2025-03-24

## 2025-03-24 NOTE — TELEPHONE ENCOUNTER
Patient's spouse Minnie called (on Release of Information), verified patient's Name and . \[facility] [place] [facility]\"" a representative from [facility] came today for meet and greet. They are asking for patient's medical notes and order for hospice and palliative care. States patient was discharged from the hospital yesterday and has a follow-up appointment scheduled on 3/27. Looking for sooner appointment, however, none available. Spouse will keep the appointment and will further discuss this during the patient's visit.    Future Appointments   Date Time Provider Department Center   3/27/2025  3:20 PM Kaylee Johnson, APRN [id number] [place]

## 2025-03-25 ENCOUNTER — TELEPHONE (OUTPATIENT)
Dept: INTERNAL MEDICINE CLINIC | Facility: CLINIC | Age: 70
End: 2025-03-25

## 2025-03-25 NOTE — TELEPHONE ENCOUNTER
Received [facility] forms.  Forms have been signed by provider and successfully faxed.   Forms have been placed in scanning.

## 2025-03-31 ENCOUNTER — OFFICE VISIT (OUTPATIENT)
Dept: INTERNAL MEDICINE CLINIC | Facility: CLINIC | Age: 70
End: 2025-03-31

## 2025-03-31 VITALS
DIASTOLIC BLOOD PRESSURE: 65 MMHG | HEART RATE: 74 BPM | OXYGEN SATURATION: 96 % | TEMPERATURE: 98 F | SYSTOLIC BLOOD PRESSURE: 117 MMHG | WEIGHT: 226 LBS | BODY MASS INDEX: 37.65 KG/M2 | HEIGHT: 65 IN

## 2025-03-31 DIAGNOSIS — I50.33 ACUTE ON CHRONIC HEART FAILURE WITH PRESERVED EJECTION FRACTION (HCC): ICD-10-CM

## 2025-03-31 DIAGNOSIS — E11.21 CONTROLLED TYPE 2 DIABETES MELLITUS WITH DIABETIC NEPHROPATHY, WITHOUT LONG-TERM CURRENT USE OF INSULIN (HCC): ICD-10-CM

## 2025-03-31 DIAGNOSIS — K74.60 LIVER CIRRHOSIS SECONDARY TO NASH (HCC): Primary | ICD-10-CM

## 2025-03-31 DIAGNOSIS — K75.81 LIVER CIRRHOSIS SECONDARY TO NASH (HCC): Primary | ICD-10-CM

## 2025-03-31 PROCEDURE — 99214 OFFICE O/P EST MOD 30 MIN: CPT | Performed by: NURSE PRACTITIONER

## 2025-03-31 PROCEDURE — 1159F MED LIST DOCD IN RCRD: CPT | Performed by: NURSE PRACTITIONER

## 2025-03-31 PROCEDURE — 1160F RVW MEDS BY RX/DR IN RCRD: CPT | Performed by: NURSE PRACTITIONER

## 2025-03-31 PROCEDURE — 1126F AMNT PAIN NOTED NONE PRSNT: CPT | Performed by: NURSE PRACTITIONER

## 2025-03-31 RX ORDER — POTASSIUM CHLORIDE 1500 MG/1
TABLET, EXTENDED RELEASE ORAL
Qty: 90 TABLET | Refills: 0 | Status: SHIPPED | OUTPATIENT
Start: 2025-03-31

## 2025-03-31 NOTE — PROGRESS NOTES
Joe Haney is a 70 year old male.  HPI:   Patient is following up after recent emergency room visit.  Noted to have decompensated MASH cirrhosis status post TIPS, chronic HFpEF, CKD stage III, hypertension, hyperlipidemia, diabetes type 2, presented with leg edema.  Noted to have volume overload related to decompensated cirrhosis, acute on chronic HFpEF.  Chest x-ray showed small to moderate left pleural effusion and interstitial thickening, noted to have 20 pound weight gain and BNP of 195.  Ultrasound Doppler negative for DVT.  He was on IV Bumex for diuresis with good weight loss.  He was advised to continue oral Bumex at home.  Hepatology was consulted.  He is on lactulose, rifaximin, PPI, medial lower ride and Bumex.  Noted to have stable anemia and thrombocytopenia.  He was advised to start taking aspirin.  He was advised to follow-up with hepatology and cardiology.  Today he reports he is feeling well, denies any chest pain, new or worsening swelling, denies any weight gain, denies any chest pain or shortness of breath, reports adherence to medication.  Current Outpatient Medications   Medication Sig Dispense Refill    Potassium Chloride ER 20 MEQ Oral Tab CR TAKE 1 TABLET BY MOUTH TWICE DAILY FOR 2 DAYS. CONTINUE 1 TABLET DAILY THEREAFTER 90 tablet 0    midodrine 5 MG Oral Tab Take 1 tablet (5 mg total) by mouth 3 (three) times daily. 90 tablet 0    Amphetamine-Dextroamphet ER 25 MG Oral Capsule SR 24 Hr Take 1 capsule (25 mg total) by mouth every morning. 30 capsule 0    atorvastatin 10 MG Oral Tab Take 1 tablet (10 mg total) by mouth nightly. 90 tablet 1    Repaglinide 2 MG Oral Tab Take 1 tablet (2 mg total) by mouth 3 (three) times daily before meals. 270 tablet 1    atorvastatin 10 MG Oral Tab Take 1 tablet (10 mg total) by mouth nightly.      carvedilol 3.125 MG Oral Tab Take 1 tablet (3.125 mg total) by mouth 2 (two) times daily with meals.      aspirin 81 MG Oral Tab EC Take 1 tablet (81 mg  total) by mouth daily.      pantoprazole 40 MG Oral Tab EC Take 1 tablet (40 mg total) by mouth before breakfast. 90 tablet 3    zinc sulfate 220 (50 Zn) MG Oral Cap Take 1 capsule (220 mg total) by mouth daily.      Ferrous Sulfate 325 (65 Fe) MG Oral Tab Take 1 tablet (325 mg total) by mouth daily.      lactulose 10 GM/15ML Oral Solution Take 30 mL (20 g total) by mouth as needed.      bumetanide 2 MG Oral Tab Take 0.5 tablets (1 mg total) by mouth 2 (two) times daily.      omega-3 fatty acids 1000 MG Oral Cap Take 1,000 mg by mouth at bedtime.      Multiple Vitamin (MULTIVITAMIN ADULT OR) Take 1 tablet by mouth daily.      Probiotic Product (PROBIOTIC-10 OR) Take 1 capsule by mouth daily.      Continuous Blood Gluc Sensor ([employer]akutenSTYLE GOLD 3 SENSOR) Does not apply Misc 1 each daily. 1 each 11    rifaximin (XIFAXAN) 550 MG Oral Tab Take 1 tablet (550 mg total) by mouth 2 (two) times daily. 60 tablet 11    aMILoride 5 MG Oral Tab Take 2 tablets (10 mg total) by mouth 2 (two) times a day. The patient is to take 20mg per day 120 tablet 3    hydrocortisone 2.5 % External Cream as needed.      Clobetasol Propionate 0.05 % External Liquid Apply 125 mL topically As Directed. shampoo        Past Medical History:    Allergic rhinitis    Ragweed Pollen    Anxiety    Learning / catching up on age related medical issues.    Attention deficit hyperactivity disorder (ADHD)    BPH (benign prostatic hyperplasia)    Cancer (HCC)    Colon polyp found to be Stage 1 cancer.  Completely removed by removal of 5” large intestine.    Depression    Mild, related to age issues    Diabetes (HCC)    Esophageal reflux    Essential hypertension    High blood pressure    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 (HCC)    HE, Non bleeding varices    Neuropathy    hands and feet    Obesity    Osteoarthritis    Psoriasis    Sarcoidosis of lung (HCC)    Sleep apnea    Visual impairment    EYEGLASSES      Social History:  Social History     Socioeconomic  History    Marital status:    Tobacco Use    Smoking status: Former     Current packs/day: 0.00     Average packs/day: 2.0 packs/day for 25.0 years (50.0 ttl pk-yrs)     Types: Cigarettes     Start date: 4/7/1989     Quit date: 4/7/2014     Years since quitting: 10.9     Passive exposure: Past    Smokeless tobacco: Never    Tobacco comments:     [age], light smoker, quit 26-43, medium smoker 43-58, 59+ quit smoking   Vaping Use    Vaping status: Never Used   Substance and Sexual Activity    Alcohol use: Not Currently     Comment: very light social drinker, 1-2 wine or 1-2 beer, none 2016+    Drug use: No     Social Drivers of Health     Food Insecurity: Low Risk  (3/26/2025)    Received from [facility]    Food Insecurity     Have there been times that your food ran out, and you didn't have money to get more?: No     Are there times that you worry that this might happen?: No   Transportation Needs: Low Risk  (3/26/2025)    Received from [facility]    Transportation Needs     Do you have trouble getting transportation to medical appointments?: No   Stress: Patient Declined (7/22/2023)    [facility] - Occupational Stress Questionnaire     Feeling of Stress : Patient declined   Housing Stability: Low Risk  (3/26/2025)    Received from [facility]    Housing Stability     Are you worried that your electric, gas, oil, or water might be shut off?: No     Are you concerned about having a safe and reliable place to live?: No        REVIEW OF SYSTEMS:   Review of Systems   Constitutional:  Negative for activity change, appetite change, fatigue and unexpected weight change.   HENT:  Negative for congestion and dental problem.    Eyes:  Negative for discharge and visual disturbance.   Respiratory:  Negative for cough, chest tightness, shortness of breath and wheezing.    Cardiovascular:   Positive for leg swelling. Negative for chest pain and palpitations.   Gastrointestinal:  Negative for abdominal pain, constipation, diarrhea, nausea and vomiting.   Endocrine: Negative.    Genitourinary:  Negative for decreased urine volume, difficulty urinating and frequency.   Musculoskeletal:  Negative for arthralgias and back pain.   Skin:  Negative for color change, pallor and rash.   Neurological:  Negative for dizziness, seizures, numbness and headaches.   Psychiatric/Behavioral:  Negative for behavioral problems, dysphoric mood and suicidal ideas.           EXAM:   /65 (BP Location: Left arm, Patient Position: Sitting, Cuff Size: adult)   Pulse 74   Temp 98.4 °F (36.9 °C) (Temporal)   Ht 5' 5\" (1.651 m)   Wt 226 lb (102.5 kg)   SpO2 96%   BMI 37.61 kg/m²     Physical Exam  Vitals reviewed.   Constitutional:       General: He is not in acute distress.  HENT:      Head: Normocephalic.   Eyes:      General: No scleral icterus.     Conjunctiva/sclera: Conjunctivae normal.   Cardiovascular:      Rate and Rhythm: Normal rate and regular rhythm.      Pulses: Normal pulses.      Heart sounds: Normal heart sounds. No murmur heard.  Pulmonary:      Effort: Pulmonary effort is normal. No respiratory distress.      Breath sounds: Normal breath sounds. No stridor. No wheezing, rhonchi or rales.   Chest:      Chest wall: No tenderness.   Musculoskeletal:         General: No tenderness.      Cervical back: Normal range of motion.      Right lower leg: Edema present.      Left lower leg: Edema present.   Skin:     General: Skin is warm.      Coloration: Skin is not jaundiced.      Findings: No bruising.   Neurological:      Mental Status: He is alert and oriented to person, place, and time.   Psychiatric:         Mood and Affect: Mood normal.         Behavior: Behavior normal.         Thought Content: Thought content normal.         Judgment: Judgment normal.            ASSESSMENT AND PLAN:     Assessment &  Plan  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 (HCC)  Stable, continue present management and follow-up with hepatology.       Controlled type 2 diabetes mellitus with diabetic nephropathy, without long-term current use of insulin (HCC)  Stable, advised to schedule appointment with endocrinology.       Acute on chronic heart failure with preserved ejection fraction (HCC)  Stable, continue daily weight checks, continue Bumex, schedule follow-up with cardiologist at [facility].  Reviewed concerning signs and symptoms and when to go to ER.            The patient indicates understanding of these issues and agrees to the plan.  The patient is asked to return in 3 months.     The above note was creating using Dragon speech recognition technology. Please excuse any typos.

## 2025-04-01 ENCOUNTER — TELEPHONE (OUTPATIENT)
Dept: INTERNAL MEDICINE CLINIC | Facility: CLINIC | Age: 70
End: 2025-04-01

## 2025-04-01 NOTE — TELEPHONE ENCOUNTER
SARA  Pt was recently hospitalized 3/18th- 23 at [facility], eligible for Nurse case management - Dx Non alcoholic fatty liver but have been unable to reach pt , advised pt was seen yesterday , stated no need to call back

## 2025-04-03 ENCOUNTER — TELEPHONE (OUTPATIENT)
Dept: INTERNAL MEDICINE CLINIC | Facility: CLINIC | Age: 70
End: 2025-04-03

## 2025-04-03 NOTE — TELEPHONE ENCOUNTER
Home health certification Order # [id number]  Signed and faxed. Fax # [phone]  Fax confirmation rec'd.

## 2025-04-04 DIAGNOSIS — F90.0 ATTENTION DEFICIT HYPERACTIVITY DISORDER (ADHD), PREDOMINANTLY INATTENTIVE TYPE: ICD-10-CM

## 2025-04-07 RX ORDER — DEXTROAMPHETAMINE SACCHARATE, AMPHETAMINE ASPARTATE MONOHYDRATE, DEXTROAMPHETAMINE SULFATE AND AMPHETAMINE SULFATE 6.25; 6.25; 6.25; 6.25 MG/1; MG/1; MG/1; MG/1
25 CAPSULE, EXTENDED RELEASE ORAL EVERY MORNING
Qty: 30 CAPSULE | Refills: 0 | Status: SHIPPED | OUTPATIENT
Start: 2025-04-07

## 2025-04-14 ENCOUNTER — APPOINTMENT (OUTPATIENT)
Dept: URBAN - METROPOLITAN AREA CLINIC 244 | Age: 70
Setting detail: DERMATOLOGY
End: 2025-04-14

## 2025-04-14 DIAGNOSIS — L81.4 OTHER MELANIN HYPERPIGMENTATION: ICD-10-CM

## 2025-04-14 DIAGNOSIS — D22 MELANOCYTIC NEVI: ICD-10-CM

## 2025-04-14 DIAGNOSIS — L82.1 OTHER SEBORRHEIC KERATOSIS: ICD-10-CM

## 2025-04-14 DIAGNOSIS — Z12.83 ENCOUNTER FOR SCREENING FOR MALIGNANT NEOPLASM OF SKIN: ICD-10-CM

## 2025-04-14 DIAGNOSIS — Z85.820 PERSONAL HISTORY OF MALIGNANT MELANOMA OF SKIN: ICD-10-CM

## 2025-04-14 DIAGNOSIS — I89.0 LYMPHEDEMA, NOT ELSEWHERE CLASSIFIED: ICD-10-CM

## 2025-04-14 PROBLEM — D22.5 MELANOCYTIC NEVI OF TRUNK: Status: ACTIVE | Noted: 2025-04-14

## 2025-04-14 PROBLEM — D48.5 NEOPLASM OF UNCERTAIN BEHAVIOR OF SKIN: Status: ACTIVE | Noted: 2025-04-14

## 2025-04-14 PROBLEM — D22.9 MELANOCYTIC NEVI, UNSPECIFIED: Status: ACTIVE | Noted: 2025-04-14

## 2025-04-14 PROCEDURE — OTHER SHAVE REMOVAL: OTHER

## 2025-04-14 PROCEDURE — OTHER COUNSELING: OTHER

## 2025-04-14 PROCEDURE — OTHER CONSULTATION EXCISION: OTHER

## 2025-04-14 PROCEDURE — OTHER DIAGNOSIS COMMENT: OTHER

## 2025-04-14 PROCEDURE — 99214 OFFICE O/P EST MOD 30 MIN: CPT | Mod: 25

## 2025-04-14 PROCEDURE — 11301 SHAVE SKIN LESION 0.6-1.0 CM: CPT

## 2025-04-14 PROCEDURE — OTHER DEFER: OTHER

## 2025-04-14 ASSESSMENT — LOCATION ZONE DERM
LOCATION ZONE: LEG
LOCATION ZONE: TRUNK

## 2025-04-14 ASSESSMENT — LOCATION SIMPLE DESCRIPTION DERM
LOCATION SIMPLE: RIGHT PRETIBIAL REGION
LOCATION SIMPLE: LEFT PRETIBIAL REGION
LOCATION SIMPLE: ABDOMEN

## 2025-04-14 ASSESSMENT — LOCATION DETAILED DESCRIPTION DERM
LOCATION DETAILED: RIGHT DISTAL PRETIBIAL REGION
LOCATION DETAILED: LEFT LATERAL ABDOMEN
LOCATION DETAILED: LEFT DISTAL PRETIBIAL REGION

## 2025-04-14 NOTE — PROCEDURE: COUNSELING
When Should The Patient Follow-Up For Their Next Full-Body Skin Exam?: 4 Months
Detail Level: Detailed
Detail Level: Generalized
Detail Level: Simple
Detail Level: Zone

## 2025-04-14 NOTE — PROCEDURE: CONSULTATION EXCISION
Detail Level: Detailed
X Size Of Lesion In Cm (Optional): 0
Other Immunosuppression Factors: organ transplant candidate

## 2025-04-14 NOTE — PROCEDURE: DIAGNOSIS COMMENT
Detail Level: Simple
Render Risk Assessment In Note?: no
Comment: Location: L mid back\\nDate treated: 9/2018\\n[place] DEPTH: 0.9 mm\\n\\nROS reviewed today:  per pt has no lumps/bumps, headaches, unintentional WL, loss of appetite, changes at removal site, or new / concerning moles.\\n\\nVision changes reported from hx of cataract surgery.

## 2025-04-14 NOTE — PROCEDURE: SHAVE REMOVAL
Medical Necessity Information: It is in your best interest to select a reason for this procedure from the list below. All of these items fulfill various CMS LCD requirements except the new and changing color options.
Was A Bandage Applied: Yes
Size Of Lesion In Cm (Required): 0.8
Bill For Surgical Tray: no
Anesthesia Volume In Cc: 1
Consent was obtained from the patient. The risks and benefits were discussed in detail. Specifically, the risks of infection, scarring, scabbing, bleeding, prolonged wound healing, incomplete removal, allergy to anesthesia, nerve injury and recurrence were addressed. Prior to the procedure, the treatment site was clearly identified and confirmed by the patient. The scar produced with a shave removal typically appears atrophic and white but can be raised / hypertrophic / keloidal.  Patient aware removal is done for medical purposes (not cosmetic). Patient aware that sometimes removal results can be inconclusive and that another removal or procedure may be required. Patient aware that another procedure may be required pending the diagnosis. \\nThe rational for the shave removal today was explained to the patient, specifically that smaller sampling without margins could result in inadeqaute result or the need for another procedure for definitive treatment. The removal was not done for cosmetic purposes and pt was given the opportunity to obtain second opinion. Pt allowed the opportunity to also return for removal if they prefer, but removal was offered today due to the medical indication given findings. All of patient's questions were answered to their satisfaction and they wished to proceed.
X Size Of Lesion In Cm (Optional): 0
Post-Care Instructions: I reviewed with the patient in detail post-care instructions.\\n\\n Following the removal petrolatum and a bandage were applied. Patient will be notified of biopsy results. However, patient instructed to call the office if not contacted within 2 weeks. Reviewed with the patient in detail post-care instructions. Patient is to keep the area dry for 48 hours, and not to engage in any swimming until the area is healed. If patient does have water exposure, recommend waterproof (hydrocolloid dressing) application. Should the patient develop any fevers, chills, bleeding, severe pain, then the patient will contact the office immediately.\\n\\n** The patient was explicitly instructed to NOT apply any other topicals to the area other than plain vaseline with a clean Q-tip **. No Neosporin or topical Ab unless prescribed/recommended by the Dr is to be used.\\n\\nThe patient is aware to have the area re-evaluated if it is not healed
Anesthesia Type: 0.5% lidocaine with 1:200,000 epinephrine and a 1:10 solution of 8.4% sodium bicarbonate
Notification Instructions: Patient will be notified of pathology results. However, patient instructed to call the office if not contacted within 2 weeks.
Detail Level: Detailed
Medical Necessity Clause: This procedure was medically necessary because the lesion that was treated was:
Biopsy Method: double edge Personna blade
Hemostasis: Drysol
Lab: [id number]
Billing Type: Third-Party Bill
Size Of Margin In Cm (Margins Are Not Added To Billing Dimensions): 0.2
Wound Care: Petrolatum
Depth Of Shave: dermis

## 2025-04-16 ENCOUNTER — LAB REQUISITION (OUTPATIENT)
Dept: LAB | Age: 70
End: 2025-04-16

## 2025-04-16 DIAGNOSIS — R31.21 ASYMPTOMATIC MICROSCOPIC HEMATURIA: ICD-10-CM

## 2025-04-16 DIAGNOSIS — N39.0 URINARY TRACT INFECTION, SITE NOT SPECIFIED: ICD-10-CM

## 2025-04-16 PROCEDURE — 81001 URINALYSIS AUTO W/SCOPE: CPT | Performed by: CLINICAL MEDICAL LABORATORY

## 2025-04-16 PROCEDURE — 87077 CULTURE AEROBIC IDENTIFY: CPT | Performed by: CLINICAL MEDICAL LABORATORY

## 2025-04-16 PROCEDURE — 87086 URINE CULTURE/COLONY COUNT: CPT | Performed by: CLINICAL MEDICAL LABORATORY

## 2025-04-16 PROCEDURE — 87181 SC STD AGAR DILUTION PER AGT: CPT | Performed by: CLINICAL MEDICAL LABORATORY

## 2025-04-16 PROCEDURE — 87186 SC STD MICRODIL/AGAR DIL: CPT | Performed by: CLINICAL MEDICAL LABORATORY

## 2025-04-17 LAB
APPEARANCE UR: ABNORMAL
BACTERIA #/AREA URNS HPF: ABNORMAL /HPF
BILIRUB UR QL STRIP: NEGATIVE
COLOR UR: YELLOW
GLUCOSE UR STRIP-MCNC: NEGATIVE MG/DL
HGB UR QL STRIP: ABNORMAL
HYALINE CASTS #/AREA URNS LPF: ABNORMAL /LPF
KETONES UR STRIP-MCNC: NEGATIVE MG/DL
LEUKOCYTE ESTERASE UR QL STRIP: ABNORMAL
NITRITE UR QL STRIP: NEGATIVE
PH UR STRIP: 7 [PH] (ref 5–7)
PROT UR STRIP-MCNC: ABNORMAL MG/DL
RBC #/AREA URNS HPF: ABNORMAL /HPF
SP GR UR STRIP: 1.01 (ref 1–1.03)
SQUAMOUS #/AREA URNS HPF: ABNORMAL /HPF
UROBILINOGEN UR STRIP-MCNC: 0.2 MG/DL
WBC #/AREA URNS HPF: ABNORMAL /HPF
YEAST URNS QL MICRO: PRESENT

## 2025-04-20 LAB — BACTERIA UR CULT: ABNORMAL

## 2025-04-22 RX ORDER — ESCITALOPRAM OXALATE 10 MG/1
10 TABLET ORAL DAILY
Qty: 90 TABLET | Refills: 3 | Status: SHIPPED | OUTPATIENT
Start: 2025-04-22

## 2025-04-23 NOTE — TELEPHONE ENCOUNTER
Please review.  Protocol failed / Has no protocol.      The original prescription was discontinued on 12/24/2024 by Peace Starks MD for the following reason: Patient discontinued. Renewing this prescription may not be appropriate.        Requested Prescriptions   Pending Prescriptions Disp Refills    ESCITALOPRAM 10 MG Oral Tab [Pharmacy Med Name: ESCITALOPRAM 10MG TABLETS] 90 tablet 3     Sig: TAKE 1 TABLET(10 MG) BY MOUTH DAILY       Psychiatric Non-Scheduled (Anti-Anxiety) Failed - 4/22/2025  7:18 PM        Failed - Medication is active on med list        Passed - In person appointment or virtual visit in the past 6 mos or appointment in next 3 mos        Passed - Depression Screening completed within the past 12 months

## 2025-04-28 RX ORDER — MIDODRINE HYDROCHLORIDE 5 MG/1
5 TABLET ORAL 3 TIMES DAILY
Qty: 270 TABLET | Refills: 1 | Status: SHIPPED | OUTPATIENT
Start: 2025-04-28

## 2025-04-28 NOTE — TELEPHONE ENCOUNTER
Please review: medication fails/has no protocol attached.    No future appointments    last office visit: 3/31/2025

## 2025-04-29 RX ORDER — POTASSIUM CHLORIDE 1500 MG/1
TABLET, EXTENDED RELEASE ORAL
Qty: 90 TABLET | Refills: 0 | OUTPATIENT
Start: 2025-04-29

## 2025-05-01 ENCOUNTER — APPOINTMENT (OUTPATIENT)
Dept: URBAN - METROPOLITAN AREA CLINIC 244 | Age: 70
Setting detail: DERMATOLOGY
End: 2025-05-05

## 2025-05-01 DIAGNOSIS — D22 MELANOCYTIC NEVI: ICD-10-CM

## 2025-05-01 PROBLEM — D22.5 MELANOCYTIC NEVI OF TRUNK: Status: ACTIVE | Noted: 2025-05-01

## 2025-05-01 PROCEDURE — 11402 EXC TR-EXT B9+MARG 1.1-2 CM: CPT

## 2025-05-01 PROCEDURE — 12032 INTMD RPR S/A/T/EXT 2.6-7.5: CPT

## 2025-05-01 PROCEDURE — OTHER EXCISION: OTHER

## 2025-05-01 ASSESSMENT — LOCATION ZONE DERM: LOCATION ZONE: TRUNK

## 2025-05-01 ASSESSMENT — LOCATION DETAILED DESCRIPTION DERM: LOCATION DETAILED: LEFT LATERAL ABDOMEN

## 2025-05-01 ASSESSMENT — LOCATION SIMPLE DESCRIPTION DERM: LOCATION SIMPLE: ABDOMEN

## 2025-05-01 NOTE — PROCEDURE: EXCISION
Advancement Flap (Double) Text: The defect edges were debeveled with a #15 scalpel blade. Given the location of the defect and the proximity to free margins a double advancement flap was deemed most appropriate. Using a sterile surgical marker, the appropriate advancement flaps were drawn incorporating the defect and placing the expected incisions within the relaxed skin tension lines where possible. The area thus outlined was incised deep to adipose tissue with a #15 scalpel blade. The skin margins were undermined to an appropriate distance in all directions utilizing iris scissors. Following this, the designed flaps were advanced and carried over into the primary defect and sutured into place.
Saucerization Depth: dermis and superficial adipose tissue
Posterior Auricular Interpolation Flap Text: A decision was made to reconstruct the defect utilizing an interpolation axial flap and a staged reconstruction.  A telfa template was made of the defect.  This telfa template was then used to outline the posterior auricular interpolation flap.  The donor area for the pedicle flap was then injected with anesthesia.  The flap was excised through the skin and subcutaneous tissue down to the layer of the underlying musculature.  The pedicle flap was carefully excised within this deep plane to maintain its blood supply.  The edges of the donor site were undermined.   The donor site was closed in a primary fashion.  The pedicle was then rotated into position and sutured.  Once the tube was sutured into place, adequate blood supply was confirmed with blanching and refill.  The pedicle was then wrapped with xeroform gauze and dressed appropriately with a telfa and gauze bandage to ensure continued blood supply and protect the attached pedicle.
H Plasty Text: Given the location of the defect, shape of the defect and the proximity to free margins a H-plasty was deemed most appropriate for repair. Using a sterile surgical marker, the appropriate advancement arms of the H-plasty were drawn incorporating the defect and placing the expected incisions within the relaxed skin tension lines where possible. The area thus outlined was incised deep to adipose tissue with a #15 scalpel blade. The skin margins were undermined to an appropriate distance in all directions utilizing iris scissors.  The opposing advancement arms were then advanced and carried over into place in opposite direction and anchored with interrupted buried subcutaneous sutures.
M-Plasty Intermediate Repair Preamble Text (Leave Blank If You Do Not Want): Undermining was performed with blunt dissection.
Add Option For Hemigard When Performing Closure?: No
Complex Repair And W Plasty Text: The defect edges were debeveled with a #15 scalpel blade.  The primary defect was closed partially with a complex linear closure.  Given the location of the remaining defect, shape of the defect and the proximity to free margins a W plasty was deemed most appropriate for complete closure of the defect.  Using a sterile surgical marker, an appropriate advancement flap was drawn incorporating the defect and placing the expected incisions within the relaxed skin tension lines where possible. The area thus outlined was incised deep to adipose tissue with a #15 scalpel blade. The skin margins were undermined to an appropriate distance in all directions utilizing iris scissors and carried over to close the primary defect.
Complex Repair And O-L Flap Text: The defect edges were debeveled with a #15 scalpel blade.  The primary defect was closed partially with a complex linear closure.  Given the location of the remaining defect, shape of the defect and the proximity to free margins an O-L flap was deemed most appropriate for complete closure of the defect.  Using a sterile surgical marker, an appropriate flap was drawn incorporating the defect and placing the expected incisions within the relaxed skin tension lines where possible. The area thus outlined was incised deep to adipose tissue with a #15 scalpel blade. The skin margins were undermined to an appropriate distance in all directions utilizing iris scissors and carried over to close the primary defect.
Alar Island Pedicle Flap Text: The defect edges were debeveled with a #15 scalpel blade. Given the location of the defect, shape of the defect and the proximity to the alar rim an island pedicle advancement flap was deemed most appropriate. Using a sterile surgical marker, an appropriate advancement flap was drawn incorporating the defect, outlining the appropriate donor tissue and placing the expected incisions within the nasal ala running parallel to the alar rim. The area thus outlined was incised with a #15 scalpel blade. The skin margins were undermined minimally to an appropriate distance in all directions around the primary defect and laterally outward around the island pedicle utilizing iris scissors.  There was minimal undermining beneath the pedicle flap. Following this, the designed flap was carried over into the primary defect and sutured into place.
Nasalis Myocutaneous Flap Text: Using a #15 blade, an incision was made around the donor flap to the level of the nasalis muscle. Wide lateral undermining was then performed in both the subcutaneous plane above the nasalis muscle, and in a submuscular plane just above periosteum. This allowed the formation of a free nasalis muscle axial pedicle which was still attached to the actual cutaneous flap, increasing its mobility and vascular viability. Hemostasis was obtained with pinpoint electrocoagulation. The flap was mobilized into position and the pivotal anchor points positioned and stabilized with buried interrupted sutures. Subcutaneous and dermal tissues were closed in a multilayered fashion with sutures. Tissue redundancies were excised, and the epidermal edges were apposed without significant tension and sutured with sutures.
Complex Repair And Double M Plasty Text: The defect edges were debeveled with a #15 scalpel blade.  The primary defect was closed partially with a complex linear closure.  Given the location of the remaining defect, shape of the defect and the proximity to free margins a double M plasty was deemed most appropriate for complete closure of the defect.  Using a sterile surgical marker, an appropriate advancement flap was drawn incorporating the defect and placing the expected incisions within the relaxed skin tension lines where possible. The area thus outlined was incised deep to adipose tissue with a #15 scalpel blade. The skin margins were undermined to an appropriate distance in all directions utilizing iris scissors and carried over to close the primary defect.
Island Pedicle Flap With Canthal Suspension Text: The defect edges were debeveled with a #15 scalpel blade. Given the location of the defect, shape of the defect and the proximity to free margins an island pedicle advancement flap was deemed most appropriate. Using a sterile surgical marker, an appropriate advancement flap was drawn incorporating the defect, outlining the appropriate donor tissue and placing the expected incisions within the relaxed skin tension lines where possible. The area thus outlined was incised deep to adipose tissue with a #15 scalpel blade. The skin margins were undermined to an appropriate distance in all directions around the primary defect and laterally outward around the island pedicle utilizing iris scissors.  There was minimal undermining beneath the pedicle flap. A suspension suture was placed in the canthal tendon to prevent tension and prevent ectropion. Following this, the designed flap was placed into the primary defect and sutured into place.
Mercedes Flap Text: The defect edges were debeveled with a #15 scalpel blade. Given the location of the defect, shape of the defect and the proximity to free margins a Mercedes flap was deemed most appropriate. Using a sterile surgical marker, an appropriate advancement flap was drawn incorporating the defect and placing the expected incisions within the relaxed skin tension lines where possible. The area thus outlined was incised deep to adipose tissue with a #15 scalpel blade. The skin margins were undermined to an appropriate distance in all directions utilizing iris scissors. Following this, the designed flap was advanced and carried over into the primary defect and sutured into place.
Peng Advancement Flap Text: The defect edges were debeveled with a #15 scalpel blade. Given the location of the defect, shape of the defect and the proximity to free margins a Peng advancement flap was deemed most appropriate. Using a sterile surgical marker, an appropriate advancement flap was drawn incorporating the defect and placing the expected incisions within the relaxed skin tension lines where possible. The area thus outlined was incised deep to adipose tissue with a #15 scalpel blade. The skin margins were undermined to an appropriate distance in all directions utilizing iris scissors. Following this, the designed flap was advanced and carried over into the primary defect and sutured into place.
Detail Level: Detailed
Wound Care: Petrolatum
Lip Wedge Excision Repair Text: Given the location of the defect and the proximity to free margins a full thickness wedge repair was deemed most appropriate. Using a sterile surgical marker, the appropriate repair was drawn incorporating the defect and placing the expected incisions perpendicular to the vermilion border.  The vermilion border was also meticulously outlined to ensure appropriate reapproximation during the repair.  The area thus outlined was incised through and through with a #15 scalpel blade.  The muscularis and dermis were reaproximated with deep sutures following hemostasis. Care was taken to realign the vermilion border before proceeding with the superficial closure.  Once the vermilion was realigned the superfical and mucosal closure was finished.
Hemigard Intro: Due to skin fragility and wound tension, it was decided to use HEMIGARD adhesive retention suture devices to permit a linear closure. The skin was cleaned and dried for a 6cm distance away from the wound. Excessive hair, if present, was removed to allow for adhesion.
Vermilion Border Text: The closure involved the vermilion border.
Bilateral Rotation Flap Text: The defect edges were debeveled with a #15 scalpel blade. Given the location of the defect, shape of the defect and the proximity to free margins a bilateral rotation flap was deemed most appropriate. Using a sterile surgical marker, an appropriate rotation flap was drawn incorporating the defect and placing the expected incisions within the relaxed skin tension lines where possible. The area thus outlined was incised deep to adipose tissue with a #15 scalpel blade. The skin margins were undermined to an appropriate distance in all directions utilizing iris scissors. Following this, the designed flap was carried over into the primary defect and sutured into place.
Epidermal Sutures: 4-0 Nylon
A-T Advancement Flap Text: The defect edges were debeveled with a #15 scalpel blade. Given the location of the defect, shape of the defect and the proximity to free margins an A-T advancement flap was deemed most appropriate. Using a sterile surgical marker, an appropriate advancement flap was drawn incorporating the defect and placing the expected incisions within the relaxed skin tension lines where possible. The area thus outlined was incised deep to adipose tissue with a #15 scalpel blade. The skin margins were undermined to an appropriate distance in all directions utilizing iris scissors. Following this, the designed flap was advanced and carried over into the primary defect and sutured into place.
Hemostasis: Electrodesiccation
Debridement Text: The wound edges were debrided prior to proceeding with the closure to facilitate wound healing.
Scalpel Size: 15 blade
Mastoid Interpolation Flap Text: A decision was made to reconstruct the defect utilizing an interpolation axial flap and a staged reconstruction.  A telfa template was made of the defect.  This telfa template was then used to outline the mastoid interpolation flap.  The donor area for the pedicle flap was then injected with anesthesia.  The flap was excised through the skin and subcutaneous tissue down to the layer of the underlying musculature.  The pedicle flap was carefully excised within this deep plane to maintain its blood supply.  The edges of the donor site were undermined.   The donor site was closed in a primary fashion.  The pedicle was then rotated into position and sutured.  Once the tube was sutured into place, adequate blood supply was confirmed with blanching and refill.  The pedicle was then wrapped with xeroform gauze and dressed appropriately with a telfa and gauze bandage to ensure continued blood supply and protect the attached pedicle.
Add Superficial Fascia When Documenting Dermal Sutures?: Yes
Trilobed Flap Text: The defect edges were debeveled with a #15 scalpel blade. Given the location of the defect and the proximity to free margins a trilobed flap was deemed most appropriate. Using a sterile surgical marker, an appropriate trilobed flap was drawn around the defect. The area thus outlined was incised deep to adipose tissue with a #15 scalpel blade. The skin margins were undermined to an appropriate distance in all directions utilizing iris scissors. Following this, the designed flap was carried into the primary defect and sutured into place.
Information: Selecting Yes will display possible errors in your note based on the variables you have selected. This validation is only offered as a suggestion for you. PLEASE NOTE THAT THE VALIDATION TEXT WILL BE REMOVED WHEN YOU FINALIZE YOUR NOTE. IF YOU WANT TO FAX A PRELIMINARY NOTE YOU WILL NEED TO TOGGLE THIS TO 'NO' IF YOU DO NOT WANT IT IN YOUR FAXED NOTE.
Suturegard Body: The suture ends were repeatedly re-tightened and re-clamped to achieve the desired tissue expansion.
Island Pedicle Flap Text: The defect edges were debeveled with a #15 scalpel blade. Given the location of the defect, shape of the defect and the proximity to free margins an island pedicle advancement flap was deemed most appropriate. Using a sterile surgical marker, an appropriate advancement flap was drawn incorporating the defect, outlining the appropriate donor tissue and placing the expected incisions within the relaxed skin tension lines where possible. The area thus outlined was incised deep to adipose tissue with a #15 scalpel blade. The skin margins were undermined to an appropriate distance in all directions around the primary defect and laterally outward around the island pedicle utilizing iris scissors.  There was minimal undermining beneath the pedicle flap. Following this, the flap was carried over into the primary defect and sutured into place.
Intermediate / Complex Repair - Final Wound Length In Cm: 4
Estimated Blood Loss (Cc): minimal
Dorsal Nasal Flap Text: The defect edges were debeveled with a #15 scalpel blade. Given the location of the defect and the proximity to free margins a dorsal nasal flap was deemed most appropriate. Using a sterile surgical marker, an appropriate dorsal nasal flap was drawn around the defect. The area thus outlined was incised deep to adipose tissue with a #15 scalpel blade. The skin margins were undermined to an appropriate distance in all directions utilizing iris scissors. Following this, the designed flap was carried into the primary defect and sutured into place.
Spiral Flap Text: The defect edges were debeveled with a #15 scalpel blade. Given the location of the defect, shape of the defect and the proximity to free margins a spiral flap was deemed most appropriate. Using a sterile surgical marker, an appropriate rotation flap was drawn incorporating the defect and placing the expected incisions within the relaxed skin tension lines where possible. The area thus outlined was incised deep to adipose tissue with a #15 scalpel blade. The skin margins were undermined to an appropriate distance in all directions utilizing iris scissors. Following this, the designed flap was carried over into the primary defect and sutured into place.
Rectangular Flap Text: The defect edges were debeveled with a #15 scalpel blade. Given the location of the defect and the proximity to free margins a rectangular flap was deemed most appropriate. Using a sterile surgical marker, an appropriate rectangular flap was drawn incorporating the defect. The area thus outlined was incised deep to adipose tissue with a #15 scalpel blade. The skin margins were undermined to an appropriate distance in all directions utilizing iris scissors. Following this, the designed flap was carried over into the primary defect and sutured into place.
Size Of Margin In Cm: 0.5
Post-Care Instructions: I reviewed with the patient in detail post-care instructions. Patient is not to engage in any heavy lifting, exercise, or swimming for the next 14 days. Should the patient develop any fevers, chills, bleeding, severe pain patient will contact the office immediately.
Complex Repair And Dermal Autograft Text: The defect edges were debeveled with a #15 scalpel blade.  The primary defect was closed partially with a complex linear closure.  Given the location of the defect, shape of the defect and the proximity to free margins an dermal autograft was deemed most appropriate to repair the remaining defect.  The graft was trimmed to fit the size of the remaining defect.  The graft was then placed in the primary defect, oriented appropriately, and sutured into place.
Suturegard Retention Suture: 2-0 Nylon
Epidermal Closure Graft Donor Site (Optional): simple interrupted
Composite Graft Text: The defect edges were debeveled with a #15 scalpel blade. Given the location of the defect, shape of the defect, the proximity to free margins and the fact the defect was full thickness a composite graft was deemed most appropriate.  The defect was outline and then transferred to the donor site.  A full thickness graft was then excised from the donor site. The graft was then placed in the primary defect, oriented appropriately and then sutured into place.  The secondary defect was then repaired using a primary closure.
Body Location Override (Optional - Billing Will Still Be Based On Selected Body Map Location If Applicable): Left Abdomen
V-Y Plasty Text: The defect edges were debeveled with a #15 scalpel blade. Given the location of the defect, shape of the defect and the proximity to free margins an V-Y advancement flap was deemed most appropriate. Using a sterile surgical marker, an appropriate advancement flap was drawn incorporating the defect and placing the expected incisions within the relaxed skin tension lines where possible. The area thus outlined was incised deep to adipose tissue with a #15 scalpel blade. The skin margins were undermined to an appropriate distance in all directions utilizing iris scissors. Following this, the designed flap was advanced and carried over into the primary defect and sutured into place.
Complex Repair And A-T Advancement Flap Text: The defect edges were debeveled with a #15 scalpel blade.  The primary defect was closed partially with a complex linear closure.  Given the location of the remaining defect, shape of the defect and the proximity to free margins an A-T advancement flap was deemed most appropriate for complete closure of the defect.  Using a sterile surgical marker, an appropriate advancement flap was drawn incorporating the defect and placing the expected incisions within the relaxed skin tension lines where possible. The area thus outlined was incised deep to adipose tissue with a #15 scalpel blade. The skin margins were undermined to an appropriate distance in all directions utilizing iris scissors and carried over to close the primary defect.
Path Notes (To The Dermatopathologist): Please check margins.
Melolabial Interpolation Flap Text: A decision was made to reconstruct the defect utilizing an interpolation axial flap and a staged reconstruction.  A telfa template was made of the defect.  This telfa template was then used to outline the melolabial interpolation flap.  The donor area for the pedicle flap was then injected with anesthesia.  The flap was excised through the skin and subcutaneous tissue down to the layer of the underlying musculature.  The pedicle flap was carefully excised within this deep plane to maintain its blood supply.  The edges of the donor site were undermined.   The donor site was closed in a primary fashion.  The pedicle was then rotated into position and sutured.  Once the tube was sutured into place, adequate blood supply was confirmed with blanching and refill.  The pedicle was then wrapped with xeroform gauze and dressed appropriately with a telfa and gauze bandage to ensure continued blood supply and protect the attached pedicle.
Complex Repair And Rhombic Flap Text: The defect edges were debeveled with a #15 scalpel blade.  The primary defect was closed partially with a complex linear closure.  Given the location of the remaining defect, shape of the defect and the proximity to free margins a rhombic flap was deemed most appropriate for complete closure of the defect.  Using a sterile surgical marker, an appropriate advancement flap was drawn incorporating the defect and placing the expected incisions within the relaxed skin tension lines where possible. The area thus outlined was incised deep to adipose tissue with a #15 scalpel blade. The skin margins were undermined to an appropriate distance in all directions utilizing iris scissors and carried over to close the primary defect.
Hatchet Flap Text: The defect edges were debeveled with a #15 scalpel blade. Given the location of the defect, shape of the defect and the proximity to free margins a hatchet flap was deemed most appropriate. Using a sterile surgical marker, an appropriate hatchet flap was drawn incorporating the defect and placing the expected incisions within the relaxed skin tension lines where possible. The area thus outlined was incised deep to adipose tissue with a #15 scalpel blade. The skin margins were undermined to an appropriate distance in all directions utilizing iris scissors. Following this, the designed flap was carried over into the primary defect and sutured into place.
Complex Repair And Melolabial Flap Text: The defect edges were debeveled with a #15 scalpel blade.  The primary defect was closed partially with a complex linear closure.  Given the location of the remaining defect, shape of the defect and the proximity to free margins a melolabial flap was deemed most appropriate for complete closure of the defect.  Using a sterile surgical marker, an appropriate advancement flap was drawn incorporating the defect and placing the expected incisions within the relaxed skin tension lines where possible. The area thus outlined was incised deep to adipose tissue with a #15 scalpel blade. The skin margins were undermined to an appropriate distance in all directions utilizing iris scissors and carried over to close the primary defect.
Saucerization Excision Additional Text (Leave Blank If You Do Not Want): The margin was drawn around the clinically apparent lesion.  Incisions were then made along these lines, in a tangential fashion, to the appropriate tissue plane and the lesion was extirpated.
Paramedian Forehead Flap Text: A decision was made to reconstruct the defect utilizing an interpolation axial flap and a staged reconstruction.  A telfa template was made of the defect.  This telfa template was then used to outline the paramedian forehead pedicle flap.  The donor area for the pedicle flap was then injected with anesthesia.  The flap was excised through the skin and subcutaneous tissue down to the layer of the underlying musculature.  The pedicle flap was carefully excised within this deep plane to maintain its blood supply.  The edges of the donor site were undermined.   The donor site was closed in a primary fashion.  The pedicle was then rotated into position and sutured.  Once the tube was sutured into place, adequate blood supply was confirmed with blanching and refill.  The pedicle was then wrapped with xeroform gauze and dressed appropriately with a telfa and gauze bandage to ensure continued blood supply and protect the attached pedicle.
Size Of Lesion In Cm: 0.8
Complex Repair And Z Plasty Text: The defect edges were debeveled with a #15 scalpel blade.  The primary defect was closed partially with a complex linear closure.  Given the location of the remaining defect, shape of the defect and the proximity to free margins a Z plasty was deemed most appropriate for complete closure of the defect.  Using a sterile surgical marker, an appropriate advancement flap was drawn incorporating the defect and placing the expected incisions within the relaxed skin tension lines where possible. The area thus outlined was incised deep to adipose tissue with a #15 scalpel blade. The skin margins were undermined to an appropriate distance in all directions utilizing iris scissors and carried over to close the primary defect.
Abbe Flap (Lower To Upper Lip) Text: The defect of the upper lip was assessed and measured.  Given the location and size of the defect, an Abbe flap was deemed most appropriate. Using a sterile surgical marker, an appropriate Abbe flap was measured and drawn on the lower lip. Local anesthesia was then infiltrated. A scalpel was then used to incise the upper lip through and through the skin, vermilion, muscle and mucosa, leaving the flap pedicled on the opposite side.  The flap was then rotated and transferred to the lower lip defect.  The flap was then sutured into place with a three layer technique, closing the orbicularis oris muscle layer with subcutaneous buried sutures, followed by a mucosal layer and an epidermal layer.
Medical Necessity Information: It is in your best interest to select a reason for this procedure from the list below. All of these items fulfill various CMS LCD requirements except lesion extends to a margin.
Repair Depth: use same depth as excision depth
Helical Rim Text: The closure involved the helical rim.
Home Suture Removal Text: Patient was provided a home suture removal kit and will remove their sutures at home.  If they have any questions or difficulties they will call the office.
Ftsg Text: The defect edges were debeveled with a #15 scalpel blade. Given the location of the defect, shape of the defect and the proximity to free margins a full thickness skin graft was deemed most appropriate. Using a sterile surgical marker, the primary defect shape was transferred to the donor site. The area thus outlined was incised deep to adipose tissue with a #15 scalpel blade.  The harvested graft was then trimmed of adipose tissue until only dermis and epidermis was left.  The skin margins of the secondary defect were undermined to an appropriate distance in all directions utilizing iris scissors.  The secondary defect was closed with interrupted buried subcutaneous sutures.  The skin edges were then re-apposed with running  sutures.  The skin graft was then placed in the primary defect and oriented appropriately.
Excision Method: Fusiform
Lab: [id number]
Additional Anesthesia Volume In Cc: 6
Anesthesia Type: 0.5% lidocaine with 1:200,000 epinephrine and a 1:10 solution of 8.4% sodium bicarbonate
Double O-Z Plasty Text: The defect edges were debeveled with a #15 scalpel blade. Given the location of the defect, shape of the defect and the proximity to free margins a Double O-Z plasty (double transposition flap) was deemed most appropriate. Using a sterile surgical marker, the appropriate transposition flaps were drawn incorporating the defect and placing the expected incisions within the relaxed skin tension lines where possible. The area thus outlined was incised deep to adipose tissue with a #15 scalpel blade. The skin margins were undermined to an appropriate distance in all directions utilizing iris scissors. Hemostasis was achieved with electrocautery. The flaps were then transposed and carried over into place, one clockwise and the other counterclockwise, and anchored with interrupted buried subcutaneous sutures.
Nasal Turnover Hinge Flap Text: The defect edges were debeveled with a #15 scalpel blade.  Given the size, depth, location of the defect and the defect being full thickness a nasal turnover hinge flap was deemed most appropriate. Using a sterile surgical marker, an appropriate hinge flap was drawn incorporating the defect. The area thus outlined was incised with a #15 scalpel blade. The flap was designed to recreate the nasal mucosal lining and the alar rim. The skin margins were undermined to an appropriate distance in all directions utilizing iris scissors. Following this, the designed flap was carried over into the primary defect and sutured into place
Anesthesia Volume In Cc: 0
Excision Depth: fascia
Flip-Flop Flap Text: The defect edges were debeveled with a #15 blade scalpel.  Given the location of the defect and the proximity to free margins a flip-flop flap was deemed most appropriate. Using a sterile surgical marker, the appropriate flap was drawn incorporating the defect and placing the expected incisions between the helical rim and antihelix where possible.  The area thus outlined was incised through and through with a #15 scalpel blade. Following this, the designed flap was carried over into the primary defect and sutured into place.
Medical Necessity Clause: This procedure was medically necessary because the lesion that was treated was:
Suturegard Intro: Intraoperative tissue expansion was performed, utilizing the SUTUREGARD device, in order to reduce wound tension.
Excisional Biopsy Additional Text (Leave Blank If You Do Not Want): The margin was drawn around the clinically apparent lesion. An elliptical shape was then drawn on the skin incorporating the lesion and margins.  Incisions were then made along these lines to the appropriate tissue plane and the lesion was extirpated.
Surgeon (Optional): 
Interpolation Flap Text: A decision was made to reconstruct the defect utilizing an interpolation axial flap and a staged reconstruction.  A telfa template was made of the defect.  This telfa template was then used to outline the interpolation flap.  The donor area for the pedicle flap was then injected with anesthesia.  The flap was excised through the skin and subcutaneous tissue down to the layer of the underlying musculature.  The interpolation flap was carefully excised within this deep plane to maintain its blood supply.  The edges of the donor site were undermined.   The donor site was closed in a primary fashion.  The pedicle was then rotated into position and sutured.  Once the tube was sutured into place, adequate blood supply was confirmed with blanching and refill.  The pedicle was then wrapped with xeroform gauze and dressed appropriately with a telfa and gauze bandage to ensure continued blood supply and protect the attached pedicle.
Complex Repair And Ftsg Text: The defect edges were debeveled with a #15 scalpel blade.  The primary defect was closed partially with a complex linear closure.  Given the location of the defect, shape of the defect and the proximity to free margins a full thickness skin graft was deemed most appropriate to repair the remaining defect.  The graft was trimmed to fit the size of the remaining defect.  The graft was then placed in the primary defect, oriented appropriately, and sutured into place.
O-T Plasty Text: The defect edges were debeveled with a #15 scalpel blade. Given the location of the defect, shape of the defect and the proximity to free margins an O-T plasty was deemed most appropriate. Using a sterile surgical marker, an appropriate O-T plasty was drawn incorporating the defect and placing the expected incisions within the relaxed skin tension lines where possible. The area thus outlined was incised deep to adipose tissue with a #15 scalpel blade. The skin margins were undermined to an appropriate distance in all directions utilizing iris scissors. Following this, the designed flap was carried over into the primary defect and sutured into place.
Hemigard Postcare Instructions: The HEMIGARD strips are to remain completely dry for at least 5-7 days.
Double Z Plasty Text: The lesion was extirpated to the level of the fat with a #15 scalpel blade. Given the location of the defect, shape of the defect and the proximity to free margins a double Z-plasty was deemed most appropriate for repair. Using a sterile surgical marker, the appropriate transposition arms of the double Z-plasty were drawn incorporating the defect and placing the expected incisions within the relaxed skin tension lines where possible. The area thus outlined was incised deep to adipose tissue with a #15 scalpel blade. The skin margins were undermined to an appropriate distance in all directions utilizing iris scissors. The opposing transposition arms were then transposed and carried over into place in opposite direction and anchored with interrupted buried subcutaneous sutures.
Positioning (Leave Blank If You Do Not Want): The patient was placed in a comfortable position exposing the surgical site.
Complex Repair And Modified Advancement Flap Text: The defect edges were debeveled with a #15 scalpel blade.  The primary defect was closed partially with a complex linear closure.  Given the location of the remaining defect, shape of the defect and the proximity to free margins a modified advancement flap was deemed most appropriate for complete closure of the defect.  Using a sterile surgical marker, an appropriate advancement flap was drawn incorporating the defect and placing the expected incisions within the relaxed skin tension lines where possible. The area thus outlined was incised deep to adipose tissue with a #15 scalpel blade. The skin margins were undermined to an appropriate distance in all directions utilizing iris scissors and carried over to close the primary defect.
Rhomboid Transposition Flap Text: The defect edges were debeveled with a #15 scalpel blade. Given the location of the defect and the proximity to free margins a rhomboid transposition flap was deemed most appropriate. Using a sterile surgical marker, an appropriate rhomboid flap was drawn incorporating the defect. The area thus outlined was incised deep to adipose tissue with a #15 scalpel blade. The skin margins were undermined to an appropriate distance in all directions utilizing iris scissors. Following this, the designed flap was carried over into the primary defect and sutured into place.
Repair Type: Intermediate
Rhombic Flap Text: The defect edges were debeveled with a #15 scalpel blade. Given the location of the defect and the proximity to free margins a rhombic flap was deemed most appropriate. Using a sterile surgical marker, an appropriate rhombic flap was drawn incorporating the defect. The area thus outlined was incised deep to adipose tissue with a #15 scalpel blade. The skin margins were undermined to an appropriate distance in all directions utilizing iris scissors. Following this, the designed flap was carried over into the primary defect and sutured into place.
Dermal Autograft Text: The defect edges were debeveled with a #15 scalpel blade. Given the location of the defect, shape of the defect and the proximity to free margins a dermal autograft was deemed most appropriate. Using a sterile surgical marker, the primary defect shape was transferred to the donor site. The area thus outlined was incised deep to adipose tissue with a #15 scalpel blade.  The harvested graft was then trimmed of adipose and epidermal tissue until only dermis was left.  The skin graft was then placed in the primary defect and oriented appropriately.
Length To Time In Minutes Device Was In Place: 10
Complex Repair And O-T Advancement Flap Text: The defect edges were debeveled with a #15 scalpel blade.  The primary defect was closed partially with a complex linear closure.  Given the location of the remaining defect, shape of the defect and the proximity to free margins an O-T advancement flap was deemed most appropriate for complete closure of the defect.  Using a sterile surgical marker, an appropriate advancement flap was drawn incorporating the defect and placing the expected incisions within the relaxed skin tension lines where possible. The area thus outlined was incised deep to adipose tissue with a #15 scalpel blade. The skin margins were undermined to an appropriate distance in all directions utilizing iris scissors and carried over to close the primary defect.
Slit Excision Additional Text (Leave Blank If You Do Not Want): A linear line was drawn on the skin overlying the lesion. An incision was made slowly until the lesion was visualized.  Once visualized, the lesion was removed with blunt dissection.
Cartilage Graft Text: The defect edges were debeveled with a #15 scalpel blade. Given the location of the defect, shape of the defect, the fact the defect involved a full thickness cartilage defect a cartilage graft was deemed most appropriate.  An appropriate donor site was identified, cleansed, and anesthetized. The cartilage graft was then harvested and transferred to the recipient site, oriented appropriately and then sutured into place.  The secondary defect was then repaired using a primary closure.
Nostril Rim Text: The closure involved the nostril rim.
Was The Patient On Physician Recommended Anticoagulation Therapy?: Please Select the Appropriate Response
Crescentic Complex Repair Preamble Text (Leave Blank If You Do Not Want): Extensive wide undermining was performed.
Advancement Flap (Single) Text: The defect edges were debeveled with a #15 scalpel blade. Given the location of the defect and the proximity to free margins a single advancement flap was deemed most appropriate. Using a sterile surgical marker, an appropriate advancement flap was drawn incorporating the defect and placing the expected incisions within the relaxed skin tension lines where possible. The area thus outlined was incised deep to adipose tissue with a #15 scalpel blade. The skin margins were undermined to an appropriate distance in all directions utilizing iris scissors. Following this, the designed flap was advanced and carried over into the primary defect and sutured into place.
Bilobed Transposition Flap Text: The defect edges were debeveled with a #15 scalpel blade. Given the location of the defect and the proximity to free margins a bilobed transposition flap was deemed most appropriate. Using a sterile surgical marker, an appropriate bilobe flap drawn around the defect. The area thus outlined was incised deep to adipose tissue with a #15 scalpel blade. The skin margins were undermined to an appropriate distance in all directions utilizing iris scissors. Following this, the designed flap was carried over into the primary defect and sutured into place.
Orbicularis Oris Muscle Flap Text: The defect edges were debeveled with a #15 scalpel blade.  Given that the defect affected the competency of the oral sphincter an orbicularis oris muscle flap was deemed most appropriate to restore this competency and normal muscle function.  Using a sterile surgical marker, an appropriate flap was drawn incorporating the defect. The area thus outlined was incised with a #15 scalpel blade. Following this, the designed flap was carried over into the primary defect and sutured into place.
Bilobed Flap Text: The defect edges were debeveled with a #15 scalpel blade. Given the location of the defect and the proximity to free margins a bilobe flap was deemed most appropriate. Using a sterile surgical marker, an appropriate bilobe flap drawn around the defect. The area thus outlined was incised deep to adipose tissue with a #15 scalpel blade. The skin margins were undermined to an appropriate distance in all directions utilizing iris scissors. Following this, the designed flap was carried over into the primary defect and sutured into place.
Banner Transposition Flap Text: The defect edges were debeveled with a #15 scalpel blade. Given the location of the defect and the proximity to free margins a Banner transposition flap was deemed most appropriate. Using a sterile surgical marker, an appropriate flap was drawn around the defect. The area thus outlined was incised deep to adipose tissue with a #15 scalpel blade. The skin margins were undermined to an appropriate distance in all directions utilizing iris scissors. Following this, the designed flap was carried into the primary defect and sutured into place.
Epidermal Closure: running locked
O-Z Flap Text: The defect edges were debeveled with a #15 scalpel blade. Given the location of the defect, shape of the defect and the proximity to free margins an O-Z flap was deemed most appropriate. Using a sterile surgical marker, an appropriate transposition flap was drawn incorporating the defect and placing the expected incisions within the relaxed skin tension lines where possible. The area thus outlined was incised deep to adipose tissue with a #15 scalpel blade. The skin margins were undermined to an appropriate distance in all directions utilizing iris scissors. Following this, the designed flap was carried over into the primary defect and sutured into place.
Burow's Advancement Flap Text: The defect edges were debeveled with a #15 scalpel blade. Given the location of the defect and the proximity to free margins a Burow's advancement flap was deemed most appropriate. Using a sterile surgical marker, the appropriate advancement flap was drawn incorporating the defect and placing the expected incisions within the relaxed skin tension lines where possible. The area thus outlined was incised deep to adipose tissue with a #15 scalpel blade. The skin margins were undermined to an appropriate distance in all directions utilizing iris scissors. Following this, the designed flap was advanced and carried over into the primary defect and sutured into place.
Helical Rim Advancement Flap Text: The defect edges were debeveled with a #15 blade scalpel.  Given the location of the defect and the proximity to free margins (helical rim) a double helical rim advancement flap was deemed most appropriate. Using a sterile surgical marker, the appropriate advancement flaps were drawn incorporating the defect and placing the expected incisions between the helical rim and antihelix where possible.  The area thus outlined was incised through and through with a #15 scalpel blade.  With a skin hook and iris scissors, the flaps were gently and sharply undermined and freed up. Folllowing this, the designed flaps were carried over into the primary defect and sutured into place.
Complex Repair And Double Advancement Flap Text: The defect edges were debeveled with a #15 scalpel blade.  The primary defect was closed partially with a complex linear closure.  Given the location of the remaining defect, shape of the defect and the proximity to free margins a double advancement flap was deemed most appropriate for complete closure of the defect.  Using a sterile surgical marker, an appropriate advancement flap was drawn incorporating the defect and placing the expected incisions within the relaxed skin tension lines where possible. The area thus outlined was incised deep to adipose tissue with a #15 scalpel blade. The skin margins were undermined to an appropriate distance in all directions utilizing iris scissors and carried over to close the primary defect.
Complex Repair And Single Advancement Flap Text: The defect edges were debeveled with a #15 scalpel blade.  The primary defect was closed partially with a complex linear closure.  Given the location of the remaining defect, shape of the defect and the proximity to free margins a single advancement flap was deemed most appropriate for complete closure of the defect.  Using a sterile surgical marker, an appropriate advancement flap was drawn incorporating the defect and placing the expected incisions within the relaxed skin tension lines where possible. The area thus outlined was incised deep to adipose tissue with a #15 scalpel blade. The skin margins were undermined to an appropriate distance in all directions utilizing iris scissors and carried over to close the primary defect.
Muscle Hinge Flap Text: The defect edges were debeveled with a #15 scalpel blade.  Given the size, depth and location of the defect and the proximity to free margins a muscle hinge flap was deemed most appropriate. Using a sterile surgical marker, an appropriate hinge flap was drawn incorporating the defect. The area thus outlined was incised with a #15 scalpel blade. The skin margins were undermined to an appropriate distance in all directions utilizing iris scissors. Following this, the designed flap was carried into the primary defect and sutured into place.
Retention Suture Text: Retention sutures were placed to support the closure and prevent dehiscence.
Melolabial Transposition Flap Text: The defect edges were debeveled with a #15 scalpel blade. Given the location of the defect and the proximity to free margins a melolabial flap was deemed most appropriate. Using a sterile surgical marker, an appropriate melolabial transposition flap was drawn incorporating the defect. The area thus outlined was incised deep to adipose tissue with a #15 scalpel blade. The skin margins were undermined to an appropriate distance in all directions utilizing iris scissors. Following this, the designed flap was carried over into the primary defect and sutured into place.
No Repair - Repaired With Adjacent Surgical Defect Text (Leave Blank If You Do Not Want): After the excision the defect was repaired concurrently with another surgical defect which was in close approximation.
Graft Donor Site Bandage (Optional-Leave Blank If You Don't Want In Note): Steri-strips and a pressure bandage were applied to the donor site.
Purse String (Simple) Text: Given the location of the defect and the characteristics of the surrounding skin a purse string simple closure was deemed most appropriate.  Undermining was performed circumferentially around the surgical defect.  A purse string suture was then placed and tightened.
Complex Repair And Transposition Flap Text: The defect edges were debeveled with a #15 scalpel blade.  The primary defect was closed partially with a complex linear closure.  Given the location of the remaining defect, shape of the defect and the proximity to free margins a transposition flap was deemed most appropriate for complete closure of the defect.  Using a sterile surgical marker, an appropriate advancement flap was drawn incorporating the defect and placing the expected incisions within the relaxed skin tension lines where possible. The area thus outlined was incised deep to adipose tissue with a #15 scalpel blade. The skin margins were undermined to an appropriate distance in all directions utilizing iris scissors and carried over to close the primary defect.
Retention Suture Bite Size: 3 mm
Mustarde Flap Text: The defect edges were debeveled with a #15 scalpel blade.  Given the size, depth and location of the defect and the proximity to free margins a Mustarde flap was deemed most appropriate. Using a sterile surgical marker, an appropriate flap was drawn incorporating the defect. The area thus outlined was incised with a #15 scalpel blade. The skin margins were undermined to an appropriate distance in all directions utilizing iris scissors. Following this, the designed flap was carried into the primary defect and sutured into place.
Width Of Defect Perpendicular To Closure In Cm (Required): 1.6
Perilesional Excision Additional Text (Leave Blank If You Do Not Want): The margin was drawn around the clinically apparent lesion. Incisions were then made along these lines to the appropriate tissue plane and the lesion was extirpated.
Suture Removal: 14 days
Staged Advancement Flap Text: The defect edges were debeveled with a #15 scalpel blade. Given the location of the defect, shape of the defect and the proximity to free margins a staged advancement flap was deemed most appropriate. Using a sterile surgical marker, an appropriate advancement flap was drawn incorporating the defect and placing the expected incisions within the relaxed skin tension lines where possible. The area thus outlined was incised deep to adipose tissue with a #15 scalpel blade. The skin margins were undermined to an appropriate distance in all directions utilizing iris scissors. Following this, the designed flap was carried over into the primary defect and sutured into place.
Billing Type: Third-Party Bill
Bi-Rhombic Flap Text: The defect edges were debeveled with a #15 scalpel blade. Given the location of the defect and the proximity to free margins a bi-rhombic flap was deemed most appropriate. Using a sterile surgical marker, an appropriate rhombic flap was drawn incorporating the defect. The area thus outlined was incised deep to adipose tissue with a #15 scalpel blade. The skin margins were undermined to an appropriate distance in all directions utilizing iris scissors. Following this, the designed flap was carried over into the primary defect and sutured into place.
Deep Sutures: 4-0 Vicryl
Fusiform Excision Additional Text (Leave Blank If You Do Not Want): The margin was drawn around the clinically apparent lesion.  A fusiform shape was then drawn on the skin incorporating the lesion and margins.  Incisions were then made along these lines to the appropriate tissue plane and the lesion was extirpated.
Nasolabial Transposition Flap Text: The defect edges were debeveled with a #15 scalpel blade.  Given the size, depth and location of the defect and the proximity to free margins a nasolabial transposition flap was deemed most appropriate. Using a sterile surgical marker, an appropriate flap was drawn incorporating the defect. The area thus outlined was incised with a #15 scalpel blade. The skin margins were undermined to an appropriate distance in all directions utilizing iris scissors. Following this, the designed flap was carried into the primary defect and sutured into place.
Bilateral Helical Rim Advancement Flap Text: The defect edges were debeveled with a #15 blade scalpel.  Given the location of the defect and the proximity to free margins (helical rim) a bilateral helical rim advancement flap was deemed most appropriate. Using a sterile surgical marker, the appropriate advancement flaps were drawn incorporating the defect and placing the expected incisions between the helical rim and antihelix where possible.  The area thus outlined was incised through and through with a #15 scalpel blade.  With a skin hook and iris scissors, the flaps were gently and sharply undermined and freed up. Following this, the designed flaps were placed into the primary defect and sutured into place.
Consent was obtained from the patient. The risks and benefits to therapy were discussed in detail. Specifically, the risks of infection, scarring, bleeding, prolonged wound healing, incomplete removal, allergy to anesthesia, nerve injury and recurrence were addressed. Prior to the procedure, the treatment site was clearly identified and confirmed by the patient.
Chonodrocutaneous Helical Advancement Flap Text: The defect edges were debeveled with a #15 scalpel blade. Given the location of the defect and the proximity to free margins a chondrocutaneous helical advancement flap was deemed most appropriate. Using a sterile surgical marker, the appropriate advancement flap was drawn incorporating the defect and placing the expected incisions within the relaxed skin tension lines where possible. The area thus outlined was incised deep to adipose tissue with a #15 scalpel blade. The skin margins were undermined to an appropriate distance in all directions utilizing iris scissors. Following this, the designed flap was advanced and carried over into the primary defect and sutured into place.
O-L Flap Text: The defect edges were debeveled with a #15 scalpel blade. Given the location of the defect, shape of the defect and the proximity to free margins an O-L flap was deemed most appropriate. Using a sterile surgical marker, an appropriate advancement flap was drawn incorporating the defect and placing the expected incisions within the relaxed skin tension lines where possible. The area thus outlined was incised deep to adipose tissue with a #15 scalpel blade. The skin margins were undermined to an appropriate distance in all directions utilizing iris scissors. Following this, the designed flap was advanced and carried over into the primary defect and sutured into place.
Estlander Flap (Lower To Upper Lip) Text: The defect of the lower lip was assessed and measured.  Given the location and size of the defect, an Estlander flap was deemed most appropriate. Using a sterile surgical marker, an appropriate Estlander flap was measured and drawn on the upper lip. Local anesthesia was then infiltrated. A scalpel was then used to incise the lateral aspect of the flap, through skin, muscle and mucosa, leaving the flap pedicled medially.  The flap was then rotated and positioned to fill the lower lip defect.  The flap was then sutured into place with a three layer technique, closing the orbicularis oris muscle layer with subcutaneous buried sutures, followed by a mucosal layer and an epidermal layer.
Repair Performed By Another Provider Text (Leave Blank If You Do Not Want): After the tissue was excised the defect was repaired by another provider.
Cheek Interpolation Flap Text: A decision was made to reconstruct the defect utilizing an interpolation axial flap and a staged reconstruction.  A telfa template was made of the defect.  This telfa template was then used to outline the Cheek Interpolation flap.  The donor area for the pedicle flap was then injected with anesthesia.  The flap was excised through the skin and subcutaneous tissue down to the layer of the underlying musculature.  The interpolation flap was carefully excised within this deep plane to maintain its blood supply.  The edges of the donor site were undermined.   The donor site was closed in a primary fashion.  The pedicle was then rotated into position and sutured.  Once the tube was sutured into place, adequate blood supply was confirmed with blanching and refill.  The pedicle was then wrapped with xeroform gauze and dressed appropriately with a telfa and gauze bandage to ensure continued blood supply and protect the attached pedicle.
Ear Star Wedge Flap Text: The defect edges were debeveled with a #15 blade scalpel.  Given the location of the defect and the proximity to free margins (helical rim) an ear star wedge flap was deemed most appropriate. Using a sterile surgical marker, the appropriate flap was drawn incorporating the defect and placing the expected incisions between the helical rim and antihelix where possible.  The area thus outlined was incised through and through with a #15 scalpel blade. Following this, the designed flap was carried over into the primary defect and sutured into place.
V-Y Flap Text: The defect edges were debeveled with a #15 scalpel blade. Given the location of the defect, shape of the defect and the proximity to free margins a V-Y flap was deemed most appropriate. Using a sterile surgical marker, an appropriate advancement flap was drawn incorporating the defect and placing the expected incisions within the relaxed skin tension lines where possible. The area thus outlined was incised deep to adipose tissue with a #15 scalpel blade. The skin margins were undermined to an appropriate distance in all directions utilizing iris scissors. Following this, the designed flap was advanced and carried over into the primary defect and sutured into place.
Complex Repair And M Plasty Text: The defect edges were debeveled with a #15 scalpel blade.  The primary defect was closed partially with a complex linear closure.  Given the location of the remaining defect, shape of the defect and the proximity to free margins an M plasty was deemed most appropriate for complete closure of the defect.  Using a sterile surgical marker, an appropriate advancement flap was drawn incorporating the defect and placing the expected incisions within the relaxed skin tension lines where possible. The area thus outlined was incised deep to adipose tissue with a #15 scalpel blade. The skin margins were undermined to an appropriate distance in all directions utilizing iris scissors and carried over to close the primary defect.
W Plasty Text: The lesion was extirpated to the level of the fat with a #15 scalpel blade. Given the location of the defect, shape of the defect and the proximity to free margins a W-plasty was deemed most appropriate for repair. Using a sterile surgical marker, the appropriate transposition arms of the W-plasty were drawn incorporating the defect and placing the expected incisions within the relaxed skin tension lines where possible. The area thus outlined was incised deep to adipose tissue with a #15 scalpel blade. The skin margins were undermined to an appropriate distance in all directions utilizing iris scissors. The opposing transposition arms were then transposed and carried over into place in opposite direction and anchored with interrupted buried subcutaneous sutures.
Complex Repair And Tissue Cultured Epidermal Autograft Text: The defect edges were debeveled with a #15 scalpel blade.  The primary defect was closed partially with a complex linear closure.  Given the location of the defect, shape of the defect and the proximity to free margins an tissue cultured epidermal autograft was deemed most appropriate to repair the remaining defect.  The graft was trimmed to fit the size of the remaining defect.  The graft was then placed in the primary defect, oriented appropriately, and sutured into place.
Eliptical Excision Additional Text (Leave Blank If You Do Not Want): The margin was drawn around the clinically apparent lesion.  An elliptical shape was then drawn on the skin incorporating the lesion and margins.  Incisions were then made along these lines to the appropriate tissue plane and the lesion was extirpated.
Complex Repair And Bilobe Flap Text: The defect edges were debeveled with a #15 scalpel blade.  The primary defect was closed partially with a complex linear closure.  Given the location of the remaining defect, shape of the defect and the proximity to free margins a bilobe flap was deemed most appropriate for complete closure of the defect.  Using a sterile surgical marker, an appropriate advancement flap was drawn incorporating the defect and placing the expected incisions within the relaxed skin tension lines where possible. The area thus outlined was incised deep to adipose tissue with a #15 scalpel blade. The skin margins were undermined to an appropriate distance in all directions utilizing iris scissors and carried over to close the primary defect.
Transposition Flap Text: The defect edges were debeveled with a #15 scalpel blade. Given the location of the defect and the proximity to free margins a transposition flap was deemed most appropriate. Using a sterile surgical marker, an appropriate transposition flap was drawn incorporating the defect. The area thus outlined was incised deep to adipose tissue with a #15 scalpel blade. The skin margins were undermined to an appropriate distance in all directions utilizing iris scissors. Following this, the designed flap was carried over into the primary defect and sutured into place.
Advancement-Rotation Flap Text: The defect edges were debeveled with a #15 scalpel blade. Given the location of the defect, shape of the defect and the proximity to free margins an advancement-rotation flap was deemed most appropriate. Using a sterile surgical marker, an appropriate flap was drawn incorporating the defect and placing the expected incisions within the relaxed skin tension lines where possible. The area thus outlined was incised deep to adipose tissue with a #15 scalpel blade. The skin margins were undermined to an appropriate distance in all directions utilizing iris scissors. Following this, the designed flap was carried over into the primary defect and sutured into place.
Rotation Flap Text: The defect edges were debeveled with a #15 scalpel blade. Given the location of the defect, shape of the defect and the proximity to free margins a rotation flap was deemed most appropriate. Using a sterile surgical marker, an appropriate rotation flap was drawn incorporating the defect and placing the expected incisions within the relaxed skin tension lines where possible. The area thus outlined was incised deep to adipose tissue with a #15 scalpel blade. The skin margins were undermined to an appropriate distance in all directions utilizing iris scissors. Following this, the designed flap was carried over into the primary defect and sutured into place.
Complex Repair And Split-Thickness Skin Graft Text: The defect edges were debeveled with a #15 scalpel blade.  The primary defect was closed partially with a complex linear closure.  Given the location of the defect, shape of the defect and the proximity to free margins a split thickness skin graft was deemed most appropriate to repair the remaining defect.  The graft was trimmed to fit the size of the remaining defect.  The graft was then placed in the primary defect, oriented appropriately, and sutured into place.
Skin Substitute Text: The defect edges were debeveled with a #15 scalpel blade. Given the location of the defect, shape of the defect and the proximity to free margins a skin substitute graft was deemed most appropriate.  The graft material was trimmed to fit the size of the defect. The graft was then placed in the primary defect and oriented appropriately.
Star Wedge Flap Text: The defect edges were debeveled with a #15 scalpel blade. Given the location of the defect, shape of the defect and the proximity to free margins a star wedge flap was deemed most appropriate. Using a sterile surgical marker, an appropriate rotation flap was drawn incorporating the defect and placing the expected incisions within the relaxed skin tension lines where possible. The area thus outlined was incised deep to adipose tissue with a #15 scalpel blade. The skin margins were undermined to an appropriate distance in all directions utilizing iris scissors. Following this, the designed flap was carried over into the primary defect and sutured into place.
Complex Repair And Dorsal Nasal Flap Text: The defect edges were debeveled with a #15 scalpel blade.  The primary defect was closed partially with a complex linear closure.  Given the location of the remaining defect, shape of the defect and the proximity to free margins a dorsal nasal flap was deemed most appropriate for complete closure of the defect.  Using a sterile surgical marker, an appropriate flap was drawn incorporating the defect and placing the expected incisions within the relaxed skin tension lines where possible. The area thus outlined was incised deep to adipose tissue with a #15 scalpel blade. The skin margins were undermined to an appropriate distance in all directions utilizing iris scissors and carried over to close the primary defect.
Complex Repair And Xenograft Text: The defect edges were debeveled with a #15 scalpel blade.  The primary defect was closed partially with a complex linear closure.  Given the location of the defect, shape of the defect and the proximity to free margins a xenograft was deemed most appropriate to repair the remaining defect.  The graft was trimmed to fit the size of the remaining defect.  The graft was then placed in the primary defect, oriented appropriately, and sutured into place.
Complex Repair And Rotation Flap Text: The defect edges were debeveled with a #15 scalpel blade.  The primary defect was closed partially with a complex linear closure.  Given the location of the remaining defect, shape of the defect and the proximity to free margins a rotation flap was deemed most appropriate for complete closure of the defect.  Using a sterile surgical marker, an appropriate advancement flap was drawn incorporating the defect and placing the expected incisions within the relaxed skin tension lines where possible. The area thus outlined was incised deep to adipose tissue with a #15 scalpel blade. The skin margins were undermined to an appropriate distance in all directions utilizing iris scissors and carried over to close the primary defect.
Island Pedicle Flap-Requiring Vessel Identification Text: The defect edges were debeveled with a #15 scalpel blade. Given the location of the defect, shape of the defect and the proximity to free margins an island pedicle advancement flap was deemed most appropriate. Using a sterile surgical marker, an appropriate advancement flap was drawn, based on the axial vessel mentioned above, incorporating the defect, outlining the appropriate donor tissue and placing the expected incisions within the relaxed skin tension lines where possible. The area thus outlined was incised deep to adipose tissue with a #15 scalpel blade. The skin margins were undermined to an appropriate distance in all directions around the primary defect and laterally outward around the island pedicle utilizing iris scissors.  There was minimal undermining beneath the pedicle flap. Following this, the designed flap was carried over into the primary defect and sutured into place.
Cheek-To-Nose Interpolation Flap Text: A decision was made to reconstruct the defect utilizing an interpolation axial flap and a staged reconstruction.  A telfa template was made of the defect.  This telfa template was then used to outline the Cheek-To-Nose Interpolation flap.  The donor area for the pedicle flap was then injected with anesthesia.  The flap was excised through the skin and subcutaneous tissue down to the layer of the underlying musculature.  The interpolation flap was carefully excised within this deep plane to maintain its blood supply.  The edges of the donor site were undermined.   The donor site was closed in a primary fashion.  The pedicle was then rotated into position and sutured.  Once the tube was sutured into place, adequate blood supply was confirmed with blanching and refill.  The pedicle was then wrapped with xeroform gauze and dressed appropriately with a telfa and gauze bandage to ensure continued blood supply and protect the attached pedicle.
Complex Repair And Skin Substitute Graft Text: The defect edges were debeveled with a #15 scalpel blade.  The primary defect was closed partially with a complex linear closure.  Given the location of the remaining defect, shape of the defect and the proximity to free margins a skin substitute graft was deemed most appropriate to repair the remaining defect.  The graft was trimmed to fit the size of the remaining defect.  The graft was then placed in the primary defect, oriented appropriately, and sutured into place.
Zygomaticofacial Flap Text: Given the location of the defect, shape of the defect and the proximity to free margins a zygomaticofacial flap was deemed most appropriate for repair. Using a sterile surgical marker, the appropriate flap was drawn incorporating the defect and placing the expected incisions within the relaxed skin tension lines where possible. The area thus outlined was incised deep to adipose tissue with a #15 scalpel blade with preservation of a vascular pedicle.  The skin margins were undermined to an appropriate distance in all directions utilizing iris scissors. The flap was then carried over into the defect and anchored with interrupted buried subcutaneous sutures.
Split-Thickness Skin Graft Text: The defect edges were debeveled with a #15 scalpel blade. Given the location of the defect, shape of the defect and the proximity to free margins a split thickness skin graft was deemed most appropriate. Using a sterile surgical marker, the primary defect shape was transferred to the donor site. The split thickness graft was then harvested.  The skin graft was then placed in the primary defect and oriented appropriately.
Complex Repair And Epidermal Autograft Text: The defect edges were debeveled with a #15 scalpel blade.  The primary defect was closed partially with a complex linear closure.  Given the location of the defect, shape of the defect and the proximity to free margins an epidermal autograft was deemed most appropriate to repair the remaining defect.  The graft was trimmed to fit the size of the remaining defect.  The graft was then placed in the primary defect, oriented appropriately, and sutured into place.
O-T Advancement Flap Text: The defect edges were debeveled with a #15 scalpel blade. Given the location of the defect, shape of the defect and the proximity to free margins an O-T advancement flap was deemed most appropriate. Using a sterile surgical marker, an appropriate advancement flap was drawn incorporating the defect and placing the expected incisions within the relaxed skin tension lines where possible. The area thus outlined was incised deep to adipose tissue with a #15 scalpel blade. The skin margins were undermined to an appropriate distance in all directions utilizing iris scissors. Following this, the designed flap was advanced and carried over into the primary defect and sutured into place.
Z Plasty Text: The lesion was extirpated to the level of the fat with a #15 scalpel blade. Given the location of the defect, shape of the defect and the proximity to free margins a Z-plasty was deemed most appropriate for repair. Using a sterile surgical marker, the appropriate transposition arms of the Z-plasty were drawn incorporating the defect and placing the expected incisions within the relaxed skin tension lines where possible. The area thus outlined was incised deep to adipose tissue with a #15 scalpel blade. The skin margins were undermined to an appropriate distance in all directions utilizing iris scissors. The opposing transposition arms were then transposed and carried over into place in opposite direction and anchored with interrupted buried subcutaneous sutures.
Abbe Flap (Upper To Lower Lip) Text: The defect of the lower lip was assessed and measured.  Given the location and size of the defect, an Abbe flap was deemed most appropriate. Using a sterile surgical marker, an appropriate Abbe flap was measured and drawn on the upper lip. Local anesthesia was then infiltrated.  A scalpel was then used to incise the upper lip through and through the skin, vermilion, muscle and mucosa, leaving the flap pedicled on the opposite side.  The flap was then rotated and transferred to the lower lip defect.  The flap was then sutured into place with a three layer technique, closing the orbicularis oris muscle layer with subcutaneous buried sutures, followed by a mucosal layer and an epidermal layer.
Dressing: dry sterile dressing
Complex Repair And V-Y Plasty Text: The defect edges were debeveled with a #15 scalpel blade.  The primary defect was closed partially with a complex linear closure.  Given the location of the remaining defect, shape of the defect and the proximity to free margins a V-Y plasty was deemed most appropriate for complete closure of the defect.  Using a sterile surgical marker, an appropriate advancement flap was drawn incorporating the defect and placing the expected incisions within the relaxed skin tension lines where possible. The area thus outlined was incised deep to adipose tissue with a #15 scalpel blade. The skin margins were undermined to an appropriate distance in all directions utilizing iris scissors and carried over to close the primary defect.
Tissue Cultured Epidermal Autograft Text: The defect edges were debeveled with a #15 scalpel blade. Given the location of the defect, shape of the defect and the proximity to free margins a tissue cultured epidermal autograft was deemed most appropriate.  The graft was then trimmed to fit the size of the defect.  The graft was then placed in the primary defect and oriented appropriately.
Pinch Graft Text: The defect edges were debeveled with a #15 scalpel blade. Given the location of the defect, shape of the defect and the proximity to free margins a pinch graft was deemed most appropriate. Using a sterile surgical marker, the primary defect shape was transferred to the donor site. The area thus outlined was incised deep to adipose tissue with a #15 scalpel blade.  The harvested graft was then trimmed of adipose tissue until only dermis and epidermis was left. The skin margins of the secondary defect were undermined to an appropriate distance in all directions utilizing iris scissors.  The secondary defect was closed with interrupted buried subcutaneous sutures.  The skin edges were then re-apposed with running  sutures.  The skin graft was then placed in the primary defect and oriented appropriately.
Purse String (Intermediate) Text: Given the location of the defect and the characteristics of the surrounding skin a purse string intermediate closure was deemed most appropriate.  Undermining was performed circumferentially around the surgical defect.  A purse string suture was then placed and tightened.
Where Do You Want The Question To Include Opioid Counseling Located?: Case Summary Tab
Double O-Z Flap Text: The defect edges were debeveled with a #15 scalpel blade. Given the location of the defect, shape of the defect and the proximity to free margins a Double O-Z flap was deemed most appropriate. Using a sterile surgical marker, an appropriate transposition flap was drawn incorporating the defect and placing the expected incisions within the relaxed skin tension lines where possible. The area thus outlined was incised deep to adipose tissue with a #15 scalpel blade. The skin margins were undermined to an appropriate distance in all directions utilizing iris scissors. Following this, the designed flap was carried over into the primary defect and sutured into place.
Adjacent Tissue Transfer Text: The defect edges were debeveled with a #15 scalpel blade. Given the location of the defect and the proximity to free margins an adjacent tissue transfer was deemed most appropriate. Using a sterile surgical marker, an appropriate flap was drawn incorporating the defect and placing the expected incisions within the relaxed skin tension lines where possible. The area thus outlined was incised deep to adipose tissue with a #15 scalpel blade. The skin margins were undermined to an appropriate distance in all directions utilizing iris scissors and carried over to close the primary defect.
Xenograft Text: The defect edges were debeveled with a #15 scalpel blade. Given the location of the defect, shape of the defect and the proximity to free margins a xenograft was deemed most appropriate.  The graft was then trimmed to fit the size of the defect.  The graft was then placed in the primary defect and oriented appropriately.
Crescentic Advancement Flap Text: The defect edges were debeveled with a #15 scalpel blade. Given the location of the defect and the proximity to free margins a crescentic advancement flap was deemed most appropriate. Using a sterile surgical marker, the appropriate advancement flap was drawn incorporating the defect and placing the expected incisions within the relaxed skin tension lines where possible. The area thus outlined was incised deep to adipose tissue with a #15 scalpel blade. The skin margins were undermined to an appropriate distance in all directions utilizing iris scissors. Following this, the designed flap was advanced and carried over into the primary defect and sutured into place.
Eye Clamp Note Details: An eye clamp was used during the procedure.
O-Z Plasty Text: The defect edges were debeveled with a #15 scalpel blade. Given the location of the defect, shape of the defect and the proximity to free margins an O-Z plasty (double transposition flap) was deemed most appropriate. Using a sterile surgical marker, the appropriate transposition flaps were drawn incorporating the defect and placing the expected incisions within the relaxed skin tension lines where possible. The area thus outlined was incised deep to adipose tissue with a #15 scalpel blade. The skin margins were undermined to an appropriate distance in all directions utilizing iris scissors. Hemostasis was achieved with electrocautery. The flaps were then transposed and carried over into place, one clockwise and the other counterclockwise, and anchored with interrupted buried subcutaneous sutures.
Epidermal Autograft Text: The defect edges were debeveled with a #15 scalpel blade. Given the location of the defect, shape of the defect and the proximity to free margins an epidermal autograft was deemed most appropriate. Using a sterile surgical marker, the primary defect shape was transferred to the donor site. The epidermal graft was then harvested.  The skin graft was then placed in the primary defect and oriented appropriately.
Modified Advancement Flap Text: The defect edges were debeveled with a #15 scalpel blade. Given the location of the defect, shape of the defect and the proximity to free margins a modified advancement flap was deemed most appropriate. Using a sterile surgical marker, an appropriate advancement flap was drawn incorporating the defect and placing the expected incisions within the relaxed skin tension lines where possible. The area thus outlined was incised deep to adipose tissue with a #15 scalpel blade. The skin margins were undermined to an appropriate distance in all directions utilizing iris scissors. Following this, the designed flap was advanced and carried over into the primary defect and sutured into place.
Double Island Pedicle Flap Text: The defect edges were debeveled with a #15 scalpel blade. Given the location of the defect, shape of the defect and the proximity to free margins a double island pedicle advancement flap was deemed most appropriate. Using a sterile surgical marker, an appropriate advancement flap was drawn incorporating the defect, outlining the appropriate donor tissue and placing the expected incisions within the relaxed skin tension lines where possible. The area thus outlined was incised deep to adipose tissue with a #15 scalpel blade. The skin margins were undermined to an appropriate distance in all directions around the primary defect and laterally outward around the island pedicle utilizing iris scissors.  There was minimal undermining beneath the pedicle flap. Following this, the flap was carried over into the primary defect and sutured into place.
Keystone Flap Text: The defect edges were debeveled with a #15 scalpel blade. Given the location of the defect, shape of the defect a keystone flap was deemed most appropriate. Using a sterile surgical marker, an appropriate keystone flap was drawn incorporating the defect, outlining the appropriate donor tissue and placing the expected incisions within the relaxed skin tension lines where possible. The area thus outlined was incised deep to adipose tissue with a #15 scalpel blade. The skin margins were undermined to an appropriate distance in all directions around the primary defect and laterally outward around the flap utilizing iris scissors. Following this, the designed flap was carried into the primary defect and sutured into place.
Burow's Graft Text: The defect edges were debeveled with a #15 scalpel blade. Given the location of the defect, shape of the defect, the proximity to free margins and the presence of a standing cone deformity a Burow's skin graft was deemed most appropriate. The standing cone was removed and this tissue was then trimmed to the shape of the primary defect. The adipose tissue was also removed until only dermis and epidermis were left.  The skin margins of the secondary defect were undermined to an appropriate distance in all directions utilizing iris scissors.  The secondary defect was closed with interrupted buried subcutaneous sutures.  The skin edges were then re-apposed with running  sutures.  The skin graft was then placed in the primary defect and oriented appropriately.
Nasalis-Muscle-Based Myocutaneous Island Pedicle Flap Text: Using a #15 blade, an incision was made around the donor flap to the level of the nasalis muscle. Wide lateral undermining was then performed in both the subcutaneous plane above the nasalis muscle, and in a submuscular plane just above periosteum. This allowed the formation of a free nasalis muscle axial pedicle (based on the angular artery) which was still attached to the actual cutaneous flap, increasing its mobility and vascular viability. Hemostasis was obtained with pinpoint electrocoagulation. The flap was mobilized into position and the pivotal anchor points positioned and stabilized with buried interrupted sutures. Subcutaneous and dermal tissues were closed in a multilayered fashion with sutures. Tissue redundancies were excised, and the epidermal edges were apposed without significant tension and sutured with sutures.
Distance Of Undermining In Cm (Required): 2
Undermining Type: Entire Wound
Mucosal Advancement Flap Text: Given the location of the defect, shape of the defect and the proximity to free margins a mucosal advancement flap was deemed most appropriate. Incisions were made with a 15 blade scalpel in the appropriate fashion along the cutaneous vermilion border and the mucosal lip. The remaining actinically damaged mucosal tissue was excised.  The mucosal advancement flap was then elevated to the gingival sulcus with care taken to preserve the neurovascular structures and advanced into the primary defect. Care was taken to ensure that precise realignment of the vermilion border was achieved.
Number Of Hemigard Strips Per Side: 1
Complex Repair And Burow's Graft Text: The defect edges were debeveled with a #15 scalpel blade.  The primary defect was closed partially with a complex linear closure.  Given the location of the defect, shape of the defect, the proximity to free margins and the presence of a standing cone deformity a Burow's graft was deemed most appropriate to repair the remaining defect.  The graft was trimmed to fit the size of the remaining defect.  The graft was then placed in the primary defect, oriented appropriately, and sutured into place.

## 2025-05-02 ENCOUNTER — TELEPHONE (OUTPATIENT)
Dept: INTERNAL MEDICINE CLINIC | Facility: CLINIC | Age: 70
End: 2025-05-02

## 2025-05-03 DIAGNOSIS — F90.0 ATTENTION DEFICIT HYPERACTIVITY DISORDER (ADHD), PREDOMINANTLY INATTENTIVE TYPE: ICD-10-CM

## 2025-05-05 RX ORDER — DEXTROAMPHETAMINE SACCHARATE, AMPHETAMINE ASPARTATE MONOHYDRATE, DEXTROAMPHETAMINE SULFATE AND AMPHETAMINE SULFATE 6.25; 6.25; 6.25; 6.25 MG/1; MG/1; MG/1; MG/1
25 CAPSULE, EXTENDED RELEASE ORAL EVERY MORNING
Qty: 30 CAPSULE | Refills: 0 | Status: SHIPPED | OUTPATIENT
Start: 2025-05-05

## 2025-05-05 NOTE — TELEPHONE ENCOUNTER
Please review; protocol failed/No Protocol      Recent Fills: 01/20/2025, 02/25/2025, 04/07/2025 #30 for 30 day supply     Last Rx Written: 04/07/2025    Last Office Visit: 03/31/2025

## 2025-05-08 ENCOUNTER — TELEPHONE (OUTPATIENT)
Dept: INTERNAL MEDICINE CLINIC | Facility: CLINIC | Age: 70
End: 2025-05-08

## 2025-05-08 NOTE — TELEPHONE ENCOUNTER
Plan of care ([id number]) Successfully signed reviewed and faxed received confirmation Fax # [phone].

## 2025-05-08 NOTE — TELEPHONE ENCOUNTER
[facility], Successfully signed reviewed and faxed received confirmation Fax # [phone].  Order # [id number].

## 2025-05-15 ENCOUNTER — APPOINTMENT (OUTPATIENT)
Dept: URBAN - METROPOLITAN AREA CLINIC 244 | Age: 70
Setting detail: DERMATOLOGY
End: 2025-05-15

## 2025-05-15 DIAGNOSIS — Z48.02 ENCOUNTER FOR REMOVAL OF SUTURES: ICD-10-CM

## 2025-05-15 PROCEDURE — 99024 POSTOP FOLLOW-UP VISIT: CPT

## 2025-05-15 PROCEDURE — OTHER SUTURE REMOVAL (GLOBAL PERIOD): OTHER

## 2025-05-15 ASSESSMENT — LOCATION ZONE DERM: LOCATION ZONE: TRUNK

## 2025-05-15 ASSESSMENT — LOCATION SIMPLE DESCRIPTION DERM: LOCATION SIMPLE: ABDOMEN

## 2025-05-15 ASSESSMENT — LOCATION DETAILED DESCRIPTION DERM: LOCATION DETAILED: LEFT LATERAL ABDOMEN

## 2025-05-16 ENCOUNTER — TELEPHONE (OUTPATIENT)
Dept: INTERNAL MEDICINE CLINIC | Facility: CLINIC | Age: 70
End: 2025-05-16

## 2025-05-16 NOTE — TELEPHONE ENCOUNTER
[facility] MARISELA/ Recent plan of care Plan ID: [id number] received    Placed on providers desk for review

## 2025-06-02 ENCOUNTER — TELEPHONE (OUTPATIENT)
Dept: INTERNAL MEDICINE CLINIC | Facility: CLINIC | Age: 70
End: 2025-06-02

## 2025-06-02 NOTE — TELEPHONE ENCOUNTER
Home health MARISELA/Recert Plan  Of Care Plan ID:[id number] Order ID: [id number]  reviewed and signed. Faxed to Fax #  [phone] confirmation rec'd.

## 2025-06-03 ENCOUNTER — OFFICE VISIT (OUTPATIENT)
Dept: INTERNAL MEDICINE CLINIC | Facility: CLINIC | Age: 70
End: 2025-06-03

## 2025-06-03 VITALS
SYSTOLIC BLOOD PRESSURE: 122 MMHG | OXYGEN SATURATION: 96 % | HEART RATE: 94 BPM | BODY MASS INDEX: 39.15 KG/M2 | DIASTOLIC BLOOD PRESSURE: 66 MMHG | WEIGHT: 235 LBS | TEMPERATURE: 99 F | HEIGHT: 65 IN

## 2025-06-03 DIAGNOSIS — F90.0 ATTENTION DEFICIT HYPERACTIVITY DISORDER (ADHD), PREDOMINANTLY INATTENTIVE TYPE: ICD-10-CM

## 2025-06-03 DIAGNOSIS — L97.528 ULCER OF LEFT FOOT WITH OTHER SEVERITY (HCC): Primary | ICD-10-CM

## 2025-06-03 PROCEDURE — 99214 OFFICE O/P EST MOD 30 MIN: CPT | Performed by: NURSE PRACTITIONER

## 2025-06-03 PROCEDURE — 1160F RVW MEDS BY RX/DR IN RCRD: CPT | Performed by: NURSE PRACTITIONER

## 2025-06-03 PROCEDURE — 1159F MED LIST DOCD IN RCRD: CPT | Performed by: NURSE PRACTITIONER

## 2025-06-03 RX ORDER — DEXTROAMPHETAMINE SACCHARATE, AMPHETAMINE ASPARTATE MONOHYDRATE, DEXTROAMPHETAMINE SULFATE AND AMPHETAMINE SULFATE 6.25; 6.25; 6.25; 6.25 MG/1; MG/1; MG/1; MG/1
25 CAPSULE, EXTENDED RELEASE ORAL EVERY MORNING
Qty: 30 CAPSULE | Refills: 0 | Status: SHIPPED | OUTPATIENT
Start: 2025-06-04

## 2025-06-03 NOTE — TELEPHONE ENCOUNTER
Please review. Refill failed protocol.     Patient will be due for refill on 06/04/25, changed on pended prescription.     Recent fills each # 30 : 5/5/25 , 4/7/25 , 2/25/25  Last prescription written: 5/5/25  Last office visit:  3/31/2025    Future Appointments   Date Time Provider Department Center   6/3/2025  3:40 PM Kaylee Johnson, APRN [id number]  [place]

## 2025-06-03 NOTE — PROGRESS NOTES
Joe Haney is a 70 year old male.  HPI:   Patient is here with wife.  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  Here today because he felt the pressure under his left foot and noticed Au wound.  Went to the urgent care who advised him to go to ER for IV antibiotics.  Patient denies any fever or chills.  Has not followed up with endocrinologist.  Last diabetic foot exam was August 2024.  Patient also reports significant edema in his left lower extremity compared to the right.  Current Medications[1]   Past Medical History[2]   Social History:  Short Social Hx on File[3]     REVIEW OF SYSTEMS:   Review of Systems   Constitutional:  Negative for activity change, appetite change, chills, diaphoresis, fatigue, fever and unexpected weight change.   HENT:  Negative for congestion.    Eyes:  Negative for visual disturbance.   Respiratory:  Negative for cough, chest tightness, shortness of breath and wheezing.    Cardiovascular:  Positive for leg swelling. Negative for chest pain and palpitations.   Gastrointestinal:  Negative for abdominal pain, constipation, diarrhea, nausea and vomiting.   Endocrine: Negative.    Genitourinary:  Negative for difficulty urinating and frequency.   Musculoskeletal:  Positive for gait problem. Negative for arthralgias and back pain.   Skin:  Positive for wound. Negative for color change, pallor and rash.   Neurological:  Negative for dizziness, seizures, numbness and headaches.   Psychiatric/Behavioral: Negative.            EXAM:   /66 (BP Location: Right arm, Patient Position: Sitting, Cuff Size: adult)   Pulse 94   Temp 98.7 °F (37.1 °C) (Temporal)   Ht 5' 5\" (1.651 m)   Wt 235 lb (106.6 kg)   SpO2 96%   BMI 39.11 kg/m²     Physical Exam  Vitals reviewed.   Constitutional:       General: He is  not in acute distress.  HENT:      Head: Normocephalic.   Eyes:      Conjunctiva/sclera: Conjunctivae normal.   Cardiovascular:      Rate and Rhythm: Normal rate and regular rhythm.   Musculoskeletal:        Feet:    Feet:      Left foot:      Skin integrity: Ulcer, skin breakdown and dry skin present. No erythema.      Toenail Condition: Left toenails are abnormally thick and long.      Comments: Ulceration noted, no bleeding.  Sanguinous fluid present.  Skin:     General: Skin is warm.      Coloration: Skin is not jaundiced.      Findings: No erythema.   Neurological:      Mental Status: He is alert and oriented to person, place, and time.   Psychiatric:         Thought Content: Thought content normal.         Judgment: Judgment normal.            ASSESSMENT AND PLAN:   Assessment & Plan  Ulcer of left foot with other severity (HCC)  Advised patient to present to ER immediately.  Patient is agreeable, wife will take him now.  Discussed with patient need to evaluate swelling in left lower extremity/rule out DVT, rule out osteomyelitis.  Offered ambulance, patient declines.  Referral provided for podiatry once patient is outpatient        The patient indicates understanding of these issues and agrees to the plan.  The patient is asked to return in PRN.     The above note was creating using Dragon speech recognition technology. Please excuse any typos.       [1]   Current Outpatient Medications   Medication Sig Dispense Refill    Amphetamine-Dextroamphet ER 25 MG Oral Capsule SR 24 Hr Take 1 capsule (25 mg total) by mouth every morning. 30 capsule 0    midodrine 5 MG Oral Tab Take 1 tablet (5 mg total) by mouth 3 (three) times daily. 270 tablet 1    escitalopram 10 MG Oral Tab Take 1 tablet (10 mg total) by mouth daily. 90 tablet 3    Potassium Chloride ER 20 MEQ Oral Tab CR TAKE 1 TABLET BY MOUTH TWICE DAILY FOR 2 DAYS. CONTINUE 1 TABLET DAILY THEREAFTER 90 tablet 0    atorvastatin 10 MG Oral Tab Take 1 tablet (10  mg total) by mouth nightly. 90 tablet 1    Repaglinide 2 MG Oral Tab Take 1 tablet (2 mg total) by mouth 3 (three) times daily before meals. 270 tablet 1    atorvastatin 10 MG Oral Tab Take 1 tablet (10 mg total) by mouth nightly.      carvedilol 3.125 MG Oral Tab Take 1 tablet (3.125 mg total) by mouth 2 (two) times daily with meals.      aspirin 81 MG Oral Tab EC Take 1 tablet (81 mg total) by mouth daily.      pantoprazole 40 MG Oral Tab EC Take 1 tablet (40 mg total) by mouth before breakfast. 90 tablet 3    zinc sulfate 220 (50 Zn) MG Oral Cap Take 1 capsule (220 mg total) by mouth daily.      Ferrous Sulfate 325 (65 Fe) MG Oral Tab Take 1 tablet (325 mg total) by mouth daily.      lactulose 10 GM/15ML Oral Solution Take 30 mL (20 g total) by mouth as needed.      bumetanide 2 MG Oral Tab Take 0.5 tablets (1 mg total) by mouth 2 (two) times daily.      omega-3 fatty acids 1000 MG Oral Cap Take 1,000 mg by mouth at bedtime.      Multiple Vitamin (MULTIVITAMIN ADULT OR) Take 1 tablet by mouth daily.      Probiotic Product (PROBIOTIC-10 OR) Take 1 capsule by mouth daily.      Continuous Blood Gluc Sensor (FREESTYLE GOLD 3 SENSOR) Does not apply Misc 1 each daily. 1 each 11    rifaximin (XIFAXAN) 550 MG Oral Tab Take 1 tablet (550 mg total) by mouth 2 (two) times daily. 60 tablet 11    aMILoride 5 MG Oral Tab Take 2 tablets (10 mg total) by mouth 2 (two) times a day. The patient is to take 20mg per day 120 tablet 3    hydrocortisone 2.5 % External Cream as needed.      Clobetasol Propionate 0.05 % External Liquid Apply 125 mL topically As Directed. shampoo     [2]   Past Medical History:   Allergic rhinitis    Ragweed Pollen    Anxiety    Learning / catching up on age related medical issues.    Attention deficit hyperactivity disorder (ADHD)    BPH (benign prostatic hyperplasia)    Cancer (HCC)    Colon polyp found to be Stage 1 cancer.  Completely removed by removal of 5” large intestine.    Depression    Mild,  related to age issues    Diabetes (HCC)    Esophageal reflux    Essential hypertension    High blood pressure    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 (HCC)    HE, Non bleeding varices    Neuropathy    hands and feet    Obesity    Osteoarthritis    Psoriasis    Sarcoidosis of lung (HCC)    Sleep apnea    Visual impairment    EYEGLASSES   [3]   Social History  Socioeconomic History    Marital status:    Tobacco Use    Smoking status: Former     Current packs/day: 0.00     Average packs/day: 2.0 packs/day for 25.0 years (50.0 ttl pk-yrs)     Types: Cigarettes     Start date: 1989     Quit date: 2014     Years since quittin.1     Passive exposure: Past    Smokeless tobacco: Never    Tobacco comments:     [age], light smoker, quit [age], medium smoker [age], 59+ quit smoking   Vaping Use    Vaping status: Never Used   Substance and Sexual Activity    Alcohol use: Not Currently     Comment: very light social drinker, 1-2 wine or 1-2 beer, none 2016+    Drug use: No     Social Drivers of Health     Food Insecurity: Low Risk  (3/26/2025)    Received from [facility]    Food Insecurity     Have there been times that your food ran out, and you didn't have money to get more?: No     Are there times that you worry that this might happen?: No   Transportation Needs: Low Risk  (3/26/2025)    Received from [facility]    Transportation Needs     Do you have trouble getting transportation to medical appointments?: No   Stress: Patient Declined (2023)    Received from [facility] - Occupational Stress Questionnaire     Feeling of Stress : Patient declined   Housing Stability: Low Risk  (3/26/2025)    Received from [facility]    Housing Stability     Are you worried that your electric, gas, oil, or water might be shut off?: No     Are you concerned about having a safe and reliable place to live?: No

## 2025-06-05 ENCOUNTER — TELEPHONE (OUTPATIENT)
Dept: INTERNAL MEDICINE CLINIC | Facility: CLINIC | Age: 70
End: 2025-06-05

## 2025-06-05 NOTE — TELEPHONE ENCOUNTER
Spoke with wife Minnie, they are at [facility]. He is admitted but in overflow room in ER. Reports they will need to be there a few days. Had consult with Podiatry and awaiting MRI.

## 2025-06-09 ENCOUNTER — TELEPHONE (OUTPATIENT)
Dept: INTERNAL MEDICINE CLINIC | Facility: CLINIC | Age: 70
End: 2025-06-09

## 2025-06-10 NOTE — TELEPHONE ENCOUNTER
Home health Discharge # [id number]  reviewed and signed. Faxed to Fax # [phone]. confirmation rec'd.

## 2025-06-12 ENCOUNTER — TELEPHONE (OUTPATIENT)
Dept: INTERNAL MEDICINE CLINIC | Facility: CLINIC | Age: 70
End: 2025-06-12

## 2025-06-17 ENCOUNTER — TELEPHONE (OUTPATIENT)
Dept: INTERNAL MEDICINE CLINIC | Facility: CLINIC | Age: 70
End: 2025-06-17

## 2025-06-17 NOTE — TELEPHONE ENCOUNTER
Spouse Minnie (COREY) calling to request appointment. Appointment schedule with provider. Wife wants to discuss patient canceling colonoscopy. Spouse wants provider to know patient canceled appointment

## 2025-06-24 RX ORDER — HYDROCHLOROTHIAZIDE 12.5 MG/1
1 CAPSULE ORAL AS DIRECTED
Qty: 6 EACH | Refills: 3 | Status: SHIPPED | OUTPATIENT
Start: 2025-06-24

## 2025-06-24 RX ORDER — ACYCLOVIR 800 MG/1
1 TABLET ORAL DAILY
Qty: 1 EACH | Refills: 11 | OUTPATIENT
Start: 2025-06-24

## 2025-06-24 RX ORDER — ATORVASTATIN CALCIUM 10 MG/1
10 TABLET, FILM COATED ORAL NIGHTLY
Qty: 90 TABLET | Refills: 1 | Status: SHIPPED | OUTPATIENT
Start: 2025-06-24

## 2025-06-24 NOTE — TELEPHONE ENCOUNTER
Please review; protocol failed/ has no protocol        Please see message below for upcoming appointment.    Future Appointments   Date Time Provider Department Center   7/2/2025 11:00 AM Peace Starks MD [address] [place]

## 2025-07-01 RX ORDER — REPAGLINIDE 2 MG/1
2 TABLET ORAL
Qty: 270 TABLET | Refills: 3 | Status: SHIPPED | OUTPATIENT
Start: 2025-07-01

## 2025-07-02 ENCOUNTER — OFFICE VISIT (OUTPATIENT)
Dept: INTERNAL MEDICINE CLINIC | Facility: CLINIC | Age: 70
End: 2025-07-02
Payer: MEDICARE

## 2025-07-02 VITALS
WEIGHT: 237 LBS | SYSTOLIC BLOOD PRESSURE: 123 MMHG | HEART RATE: 97 BPM | DIASTOLIC BLOOD PRESSURE: 66 MMHG | BODY MASS INDEX: 39.01 KG/M2 | HEIGHT: 65.5 IN

## 2025-07-02 DIAGNOSIS — F33.42 RECURRENT MAJOR DEPRESSIVE DISORDER, IN FULL REMISSION: ICD-10-CM

## 2025-07-02 DIAGNOSIS — F90.0 ATTENTION DEFICIT HYPERACTIVITY DISORDER (ADHD), PREDOMINANTLY INATTENTIVE TYPE: ICD-10-CM

## 2025-07-02 DIAGNOSIS — N18.31 STAGE 3A CHRONIC KIDNEY DISEASE (HCC): ICD-10-CM

## 2025-07-02 DIAGNOSIS — S91.302D OPEN WOUND OF LEFT FOOT, SUBSEQUENT ENCOUNTER: ICD-10-CM

## 2025-07-02 DIAGNOSIS — E11.9 TYPE 2 DIABETES MELLITUS WITHOUT COMPLICATION, WITHOUT LONG-TERM CURRENT USE OF INSULIN (HCC): Primary | ICD-10-CM

## 2025-07-02 DIAGNOSIS — D69.6 DECREASED PLATELET COUNT: ICD-10-CM

## 2025-07-02 PROCEDURE — G2211 COMPLEX E/M VISIT ADD ON: HCPCS | Performed by: INTERNAL MEDICINE

## 2025-07-02 PROCEDURE — 99214 OFFICE O/P EST MOD 30 MIN: CPT | Performed by: INTERNAL MEDICINE

## 2025-07-02 PROCEDURE — 1159F MED LIST DOCD IN RCRD: CPT | Performed by: INTERNAL MEDICINE

## 2025-07-02 PROCEDURE — 1126F AMNT PAIN NOTED NONE PRSNT: CPT | Performed by: INTERNAL MEDICINE

## 2025-07-06 PROBLEM — D61.818 PANCYTOPENIA (HCC): Status: RESOLVED | Noted: 2021-11-11 | Resolved: 2025-07-06

## 2025-07-06 PROBLEM — D69.6 DECREASED PLATELET COUNT: Status: ACTIVE | Noted: 2023-09-11

## 2025-07-07 NOTE — PROGRESS NOTES
Subjective:     Patient ID: Joe Haney is a 70 year old male.  Presents for follow-up on multiple medical chronic conditions.    HPI  Patient is being cared at [facility] for nonalcoholic liver cirrhosis, multiple admissions to the hospital, today he is here accompanied by his wife, she states when he takes lactulose appropriately mental status is much better he is at his baseline.  He has not taken lactulose this morning.  He also developed wound to his left foot, was treated for cellulitis at [facility] recently, currently getting care from home health services, saw podiatrist.  Was advised to go to wound clinic, has upcoming appointment at wound clinic for [facility].  He has been taking Prandin 2 mg 3 times a day with food does not check blood sugar at home, glucose was normal when he was recently at the hospital, reports no hypoglycemic episodes.  He has been taking Lexapro for many years for treatment of depression and anxiety and seems doing well, also needs refill on Adderall to treat ADHD which he takes for many years.  Yesterday he underwent EGD and colonoscopy, no varices were found, pathology report from colonoscopy is pending, history of colon cancer which was removed.  He has history of coronary artery disease was advised at some point to have coronary angiography but never happened, he reports no chest pain or shortness of breath, he should be on baby aspirin but review of recent EGD and anemia I would advise to discuss with gastroenterology risk of restarting aspirin.    Current Medications[1]  Allergies:Allergies[2]    Past Medical History[3]   Past Surgical History[4]   Family History[5]   Social History: Short Social Hx on File[6]     /66 (BP Location: Left arm, Patient Position: Sitting, Cuff Size: adult)   Pulse 97   Ht 5' 5.5\" (1.664 m)   Wt 237 lb (107.5 kg)   BMI 38.84 kg/m²    Physical Exam  Constitutional:       Appearance: Normal  appearance. He is obese.      Comments: Patient easily falls asleep and wakes up easily   HENT:      Head: Normocephalic and atraumatic.   Eyes:      Extraocular Movements: Extraocular movements intact.      Conjunctiva/sclera: Conjunctivae normal.      Pupils: Pupils are equal, round, and reactive to light.   Cardiovascular:      Rate and Rhythm: Normal rate and regular rhythm.      Heart sounds: No murmur heard.     No gallop.   Pulmonary:      Effort: Pulmonary effort is normal.      Breath sounds: No wheezing or rhonchi.   Musculoskeletal:         General: Normal range of motion.      Cervical back: Normal range of motion and neck supple.      Right lower leg: Edema (+2 edema below knees) present.      Left lower leg: Edema (+2 edema below knee) present.   Skin:     General: Skin is warm.      Coloration: Skin is not jaundiced.      Comments: Skin thickened of lower legs element of l lymphedema is present.  Left foot bottom however fifth metatarsal head area clean wound about 1 inch in diameter full-thickness, healthy granulation tissue visible   Neurological:      General: No focal deficit present.      Mental Status: He is oriented to person, place, and time. Mental status is at baseline. He is lethargic.      Gait: Gait normal.   Psychiatric:         Mood and Affect: Mood normal.         Behavior: Behavior normal. Behavior is cooperative.         Thought Content: Thought content normal.         Judgment: Judgment normal.         Assessment & Plan:   Stable will continue Prandin 1. Type 2 diabetes mellitus without complication, without long-term current use of insulin (HCC) for now important good glycemic control for wound healing stable, due to liver cirrhosis   2. Stage 3a chronic kidney disease (HCC) stable patient on multiple diuretics   3. Attention deficit hyperactivity disorder (ADHD), predominantly inattentive type controlled on Adderall XR 30 mg daily will continue    4. Recurrent major depressive  disorder, in full remission stable continue Lexapro 10 mg daily will not wean off at this point because of the multiple chronic conditions   5. Decreased platelet count stable, due to liver cirrhosis           Meds This Visit:  Requested Prescriptions      No prescriptions requested or ordered in this encounter       Imaging & Referrals:  None            [1]   Current Outpatient Medications   Medication Sig Dispense Refill    Repaglinide 2 MG Oral Tab Take 1 tablet (2 mg total) by mouth 3 (three) times daily before meals. 270 tablet 3    atorvastatin 10 MG Oral Tab Take 1 tablet (10 mg total) by mouth nightly. 90 tablet 1    Continuous Glucose Sensor (FREESTYLE GOLD 3 PLUS SENSOR) Does not apply Misc 1 each As Directed. INSERT 1 SENSOR ON THE BACK OF THE ARM AND CHANGE EVERY 15 DAYS. 6 each 3    Amphetamine-Dextroamphet ER 25 MG Oral Capsule SR 24 Hr Take 1 capsule (25 mg total) by mouth every morning. 30 capsule 0    midodrine 5 MG Oral Tab Take 1 tablet (5 mg total) by mouth 3 (three) times daily. 270 tablet 1    escitalopram 10 MG Oral Tab Take 1 tablet (10 mg total) by mouth daily. 90 tablet 3    Potassium Chloride ER 20 MEQ Oral Tab CR TAKE 1 TABLET BY MOUTH TWICE DAILY FOR 2 DAYS. CONTINUE 1 TABLET DAILY THEREAFTER (Patient taking differently: Take 2 tablets by mouth in the morning and 2 tablets before bedtime. TAKE 1 TABLET BY MOUTH TWICE DAILY FOR 2 DAYS. CONTINUE 1 TABLET DAILY THEREAFTER.) 90 tablet 0    carvedilol 3.125 MG Oral Tab Take 1 tablet (3.125 mg total) by mouth in the morning and 1 tablet (3.125 mg total) in the evening. Take with meals.      aspirin 81 MG Oral Tab EC Take 1 tablet (81 mg total) by mouth in the morning.      pantoprazole 40 MG Oral Tab EC Take 1 tablet (40 mg total) by mouth before breakfast. 90 tablet 3    zinc sulfate 220 (50 Zn) MG Oral Cap Take 1 capsule (220 mg total) by mouth in the morning.      Ferrous Sulfate 325 (65 Fe) MG Oral Tab Take 1 tablet (325 mg total) by  mouth in the morning.      lactulose 10 GM/15ML Oral Solution Take 45 mL (30 g total) by mouth as needed.      bumetanide 2 MG Oral Tab Take 1 tablet (2 mg total) by mouth in the morning and 1 tablet (2 mg total) before bedtime.      omega-3 fatty acids 1000 MG Oral Cap Take 1,000 mg by mouth at bedtime.      Multiple Vitamin (MULTIVITAMIN ADULT OR) Take 1 tablet by mouth in the morning.      Probiotic Product (PROBIOTIC-10 OR) Take 1 capsule by mouth in the morning.      Continuous Blood Gluc Sensor (FREESTYLE GOLD 3 SENSOR) Does not apply Misc 1 each daily. 1 each 11    rifaximin (XIFAXAN) 550 MG Oral Tab Take 1 tablet (550 mg total) by mouth 2 (two) times daily. 60 tablet 11    aMILoride 5 MG Oral Tab Take 2 tablets (10 mg total) by mouth 2 (two) times a day. The patient is to take 20mg per day (Patient taking differently: Take 1 tablet (5 mg total) by mouth in the morning and 1 tablet (5 mg total) before bedtime. The patient is to [id number] per day.) 120 tablet 3    hydrocortisone 2.5 % External Cream as needed.      Clobetasol Propionate 0.05 % External Liquid Apply 125 mL topically As Directed. shampoo     [2]   Allergies  Allergen Reactions    Misc Weeds UNKNOWN    Short Ragweed Pollen Ext UNKNOWN    Ambrosia Artemisiifolia, Ragweed OTHER (SEE COMMENTS)     hayfever    Lactose DIARRHEA   [3]   Past Medical History:   Allergic rhinitis    Ragweed Pollen    Anxiety    Learning / catching up on age related medical issues.    Attention deficit hyperactivity disorder (ADHD)    BPH (benign prostatic hyperplasia)    Cancer (HCC)    Colon polyp found to be Stage 1 cancer.  Completely removed by removal of 5” large intestine.    Depression    Mild, related to age issues    Diabetes (HCC)    Esophageal reflux    Essential hypertension    High blood pressure    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 (HCC)    HE, Non bleeding varices    Muscle weakness    Neuropathy    hands and feet    Obesity    Osteoarthritis    Psoriasis     Sarcoidosis of lung (HCC)    Sleep apnea    Visual impairment    EYEGLASSES   [4]   Past Surgical History:  Procedure Laterality Date    Cataract Bilateral     IOL's    Colonoscopy N/A 10/18/2018    Procedure: COLONOSCOPY;  Surgeon: Jude Escamilla MD;  [facility]    Colonoscopy N/A 2019    Jude Escamilla MD;  Polyps (TA)   Repeat     Colonoscopy  21= Polyps (TA)    Repeat     Colonoscopy N/A 2021    Procedure: COLONOSCOPY with HOT SNARE POLYPECTOMY, ESOPHAGOGASTRODUODENOSCOPY (EGD);  Surgeon: Flip Corey MD;  Location:  ENDOSCOPY    Hip replacement surgery  2015    Right Hip    Skin surgery  2018    excision back melanoma     Tonsillectomy      Total hip replacement Right 2015    Upper gi endoscopy performed  21= Grade 2 varices    Upper gi endoscopy,ligat varix  2023    Variceal bleed, banded in [place]    Vasectomy     [5]   Family History  Problem Relation Age of Onset    Cancer Father         Melanoma    Other (Other) Mother         strokes    Cancer Mother         Colon Cancer    Dementia Mother         NOT Alzheimers, but cognitive impairment    Hypertension Mother         Hydrochlorothiazide    Other (Other) Daughter         ASTHMA    Other (Other) Son         ASTHMA    Anemia Brother         Uses Ferrous Sulphate 325mg    Asthma Brother     Cancer Brother         Colon Cancer    Asthma Son     Obesity Son     Psychiatric Son         ADHD    Asthma Son     Asthma Daughter     Obesity Daughter     Cancer Sister         Melanoma & Breast Cancer   [6]   Social History  Socioeconomic History    Marital status:    Tobacco Use    Smoking status: Former     Current packs/day: 0.00     Average packs/day: 2.0 packs/day for 25.0 years (50.0 ttl pk-yrs)     Types: Cigarettes     Start date: 1989     Quit date: 2014     Years since quittin.2     Passive exposure: Past    Smokeless tobacco: Never    Tobacco comments:      [age], light smoker, quit [age], medium smoker [age], 59+ quit smoking   Vaping Use    Vaping status: Never Used   Substance and Sexual Activity    Alcohol use: Not Currently     Comment: very light social drinker, 1-2 wine or 1-2 beer, none 2016+    Drug use: No     Social Drivers of Health     Food Insecurity: No Food Insecurity (6/11/2025)    Received from [facility]    Hunger Vital Sign     Worried About Running Out of Food in the Last Year: Never true     Ran Out of Food in the Last Year: Never true   Transportation Needs: No Transportation Needs (6/11/2025)    Received from [facility]    PRAPARE - Transportation     Lack of Transportation (Medical): No     Lack of Transportation (Non-Medical): No   Stress: Patient Declined (7/22/2023)    Received from [facility] - Occupational Stress Questionnaire     Feeling of Stress : Patient declined   Housing Stability: Unknown (6/11/2025)    Received from [facility]    Housing Stability Vital Sign     Unable to Pay for Housing in the Last Year: No     Homeless in the Last Year: No

## 2025-07-08 ENCOUNTER — TELEPHONE (OUTPATIENT)
Dept: INTERNAL MEDICINE CLINIC | Facility: CLINIC | Age: 70
End: 2025-07-08

## 2025-07-08 NOTE — TELEPHONE ENCOUNTER
Successfully signed reviewed and faxed received confirmation Fax # [phone] Order # [id number].

## 2025-07-11 ENCOUNTER — TELEPHONE (OUTPATIENT)
Dept: INTERNAL MEDICINE CLINIC | Facility: CLINIC | Age: 70
End: 2025-07-11

## 2025-07-11 NOTE — TELEPHONE ENCOUNTER
Successfully signed reviewed and faxed received confirmation Fax # [phone] Order # [id number].   [facility].

## 2025-07-21 ENCOUNTER — TELEPHONE (OUTPATIENT)
Dept: INTERNAL MEDICINE CLINIC | Facility: CLINIC | Age: 70
End: 2025-07-21

## 2025-07-21 NOTE — TELEPHONE ENCOUNTER
Home health & certification Plan Of Care [id number]  reviewed and signed. Faxed to Fax #  confirmation rec'd.

## 2025-07-29 ENCOUNTER — TELEPHONE (OUTPATIENT)
Dept: INTERNAL MEDICINE CLINIC | Facility: CLINIC | Age: 70
End: 2025-07-29

## 2025-08-04 ENCOUNTER — OFFICE VISIT (OUTPATIENT)
Dept: INTERNAL MEDICINE CLINIC | Facility: CLINIC | Age: 70
End: 2025-08-04

## 2025-08-04 VITALS
WEIGHT: 235 LBS | SYSTOLIC BLOOD PRESSURE: 121 MMHG | BODY MASS INDEX: 38.68 KG/M2 | HEIGHT: 65.5 IN | HEART RATE: 87 BPM | DIASTOLIC BLOOD PRESSURE: 66 MMHG

## 2025-08-04 DIAGNOSIS — F33.42 RECURRENT MAJOR DEPRESSIVE DISORDER, IN FULL REMISSION: ICD-10-CM

## 2025-08-04 DIAGNOSIS — N18.31 STAGE 3A CHRONIC KIDNEY DISEASE (HCC): ICD-10-CM

## 2025-08-04 DIAGNOSIS — L97.528 ULCER OF LEFT FOOT WITH OTHER SEVERITY (HCC): ICD-10-CM

## 2025-08-04 DIAGNOSIS — F90.0 ATTENTION DEFICIT HYPERACTIVITY DISORDER (ADHD), PREDOMINANTLY INATTENTIVE TYPE: ICD-10-CM

## 2025-08-04 DIAGNOSIS — K74.60 LIVER CIRRHOSIS SECONDARY TO NASH (HCC): ICD-10-CM

## 2025-08-04 DIAGNOSIS — R19.7 DIARRHEA OF PRESUMED INFECTIOUS ORIGIN: Primary | ICD-10-CM

## 2025-08-04 DIAGNOSIS — E11.9 TYPE 2 DIABETES MELLITUS WITHOUT COMPLICATION, WITHOUT LONG-TERM CURRENT USE OF INSULIN (HCC): ICD-10-CM

## 2025-08-04 DIAGNOSIS — N30.00 ACUTE CYSTITIS WITHOUT HEMATURIA: ICD-10-CM

## 2025-08-04 DIAGNOSIS — K75.81 LIVER CIRRHOSIS SECONDARY TO NASH (HCC): ICD-10-CM

## 2025-08-04 DIAGNOSIS — D69.6 DECREASED PLATELET COUNT: ICD-10-CM

## 2025-08-04 PROCEDURE — G2211 COMPLEX E/M VISIT ADD ON: HCPCS | Performed by: INTERNAL MEDICINE

## 2025-08-04 PROCEDURE — 1126F AMNT PAIN NOTED NONE PRSNT: CPT | Performed by: INTERNAL MEDICINE

## 2025-08-04 PROCEDURE — 1111F DSCHRG MED/CURRENT MED MERGE: CPT | Performed by: INTERNAL MEDICINE

## 2025-08-04 PROCEDURE — 99214 OFFICE O/P EST MOD 30 MIN: CPT | Performed by: INTERNAL MEDICINE

## 2025-08-04 PROCEDURE — 1159F MED LIST DOCD IN RCRD: CPT | Performed by: INTERNAL MEDICINE

## 2025-08-04 PROCEDURE — 1160F RVW MEDS BY RX/DR IN RCRD: CPT | Performed by: INTERNAL MEDICINE

## 2025-08-04 RX ORDER — ONDANSETRON 4 MG/1
4 TABLET, FILM COATED ORAL ONCE
COMMUNITY
Start: 2025-01-17

## 2025-08-04 RX ORDER — PANTOPRAZOLE SODIUM 40 MG/1
40 TABLET, DELAYED RELEASE ORAL
COMMUNITY
End: 2025-08-10

## 2025-08-07 ENCOUNTER — TELEPHONE (OUTPATIENT)
Dept: INTERNAL MEDICINE CLINIC | Facility: CLINIC | Age: 70
End: 2025-08-07

## 2025-08-14 ENCOUNTER — TELEPHONE (OUTPATIENT)
Dept: INTERNAL MEDICINE CLINIC | Facility: CLINIC | Age: 70
End: 2025-08-14

## 2025-08-14 RX ORDER — PANTOPRAZOLE SODIUM 40 MG/1
40 TABLET, DELAYED RELEASE ORAL
Qty: 90 TABLET | Refills: 3 | Status: SHIPPED | OUTPATIENT
Start: 2025-08-14

## 2025-08-19 ENCOUNTER — TELEPHONE (OUTPATIENT)
Dept: INTERNAL MEDICINE CLINIC | Facility: CLINIC | Age: 70
End: 2025-08-19

## (undated) DEVICE — SNARE OPTMZ PLPCTM TRP

## (undated) DEVICE — CLIP RESOLUTION 235CM

## (undated) DEVICE — CLIP MED INTNL HMCLP TNTLM

## (undated) DEVICE — FORCEP RADIAL JAW 4

## (undated) DEVICE — SUTURE CHROMIC GUT 2-0 MH

## (undated) DEVICE — Device: Brand: CUSTOM PROCEDURE KIT

## (undated) DEVICE — TRAP 4 CPTR CHMBR N EZ INLN

## (undated) DEVICE — STERILE LATEX POWDER-FREE SURGICAL GLOVESWITH NITRILE COATING: Brand: PROTEXIS

## (undated) DEVICE — 3 ML SYRINGE LUER-LOCK TIP: Brand: MONOJECT

## (undated) DEVICE — 3M™ RED DOT™ MONITORING ELECTRODE WITH FOAM TAPE AND STICKY GEL, 50/BAG, 20/CASE, 72/PLT 2570: Brand: RED DOT™

## (undated) DEVICE — SUTURE SILK 2-0 685H

## (undated) DEVICE — SOL  .9 1000ML BTL

## (undated) DEVICE — REM POLYHESIVE ADULT PATIENT RETURN ELECTRODE: Brand: VALLEYLAB

## (undated) DEVICE — SUTURE ETHILON 3-0 669H

## (undated) DEVICE — TOWEL OR BLU 16X26 STRL

## (undated) DEVICE — CAUTERY BLADE 2IN INS E1455

## (undated) DEVICE — CLIP LGT 11MM OPEN 2.8MM 235CM

## (undated) DEVICE — SNARE CAPTIFLEX MICRO-OVL OLY

## (undated) DEVICE — SUTURE SILK 2-0 SA65H

## (undated) DEVICE — COVER PRB NEOGUARD 30X2.6CM US

## (undated) DEVICE — CONTAINER SPEC STR 4OZ GRY LID

## (undated) DEVICE — MINOR GENERAL: Brand: MEDLINE INDUSTRIES, INC.

## (undated) DEVICE — 1200CC GUARDIAN II: Brand: GUARDIAN

## (undated) DEVICE — SUTURE CHROMIC GUT 3-0 SH

## (undated) DEVICE — DRAPE SHEET LG

## (undated) DEVICE — INTENDED FOR TISSUE SEPARATION, AND OTHER PROCEDURES THAT REQUIRE A SHARP SURGICAL BLADE TO PUNCTURE OR CUT.: Brand: BARD-PARKER ® STAINLESS STEEL BLADES

## (undated) DEVICE — PENCIL ESURG 10FT 3/32IN SS

## (undated) DEVICE — SUTURE ETHILON 4-0 662G

## (undated) DEVICE — LINE MNTR ADLT SET O2 INTMD

## (undated) DEVICE — DRAPE SHEET TRANSVERSE LAP

## (undated) DEVICE — Device: Brand: DEFENDO AIR/WATER/SUCTION AND BIOPSY VALVE

## (undated) DEVICE — CHLORAPREP 26ML APPLICATOR

## (undated) DEVICE — Device: Brand: SPOT EX ENDOSCOPIC TATTOO

## (undated) DEVICE — NEEDLE CONTRAST INTERJECT 25G

## (undated) DEVICE — CLIP SM INTNL HMCLP TNTLM ESCP

## (undated) DEVICE — ENDOSCOPY PACK UPPER: Brand: MEDLINE INDUSTRIES, INC.

## (undated) DEVICE — COVER STND 54X23IN MAYO REINF

## (undated) DEVICE — 3M(TM) TEGADERM(TM) TRANSPARENT FILM DRESSING FRAME STYLE 1628: Brand: 3M™ TEGADERM™

## (undated) DEVICE — ENDOSCOPY PACK - LOWER: Brand: MEDLINE INDUSTRIES, INC.

## (undated) DEVICE — FILTERLINE NASAL ADULT O2/CO2

## (undated) DEVICE — DRAPE SHEET LAPAROTOMY

## (undated) DEVICE — SPONGE LAP 18X18 XRAY STRL

## (undated) DEVICE — DRAPE SRG 26X15IN UTL TPE STRL

## (undated) DEVICE — GAUZE SPONGES,12 PLY: Brand: CURITY

## (undated) DEVICE — STANDARD HYPODERMIC NEEDLE,POLYPROPYLENE HUB: Brand: MONOJECT

## (undated) NOTE — LETTER
[address] [facility] Jaqueline Meter  [address] [place] Meter [place] [address]  [phone]  Authorization for Imaging Procedure  Date of Procedure:     1. I hereby authorize  ROBIN Sam ____________, my physician and his/her assistants (if applicable), which may include medical students, residents, and/or fellows, to perform the following procedure and administer such anesthesia as may be determined necessary by my physician: Jovani 354 25% INFUSION  (IYA=[id number]) on [employer] . 2.  I recognize that during the procedure, unforeseen conditions may necessitate additional or different procedures than those listed above. I, therefore, further authorize and request that the above-named physician, assistants, or designees perform such procedures as are, in their judgment, necessary and desirable. 3.  My physician has discussed prior to my procedure the potential benefits, risks and side effects of this procedure; the likelihood of achieving goals; and potential problems that might occur during recuperation. They also discussed reasonable alternatives to the procedure, including risks, benefits, and side effects related to the alternatives and risks related to not receiving this procedure. I have had all my questions answered and I acknowledge that no guarantee has been made as to the result that may be obtained. 4.  Should the need arise during my procedure, which includes change of level of care prior to discharge, I also consent to the administration of blood and/or blood products. Further, I understand that despite careful testing and screening of blood or blood products by collecting agencies, I may still be subject to ill effects as a result of receiving a blood transfusion and/or blood products.  The following are some, but not all, of the potential risks that can occur: fever and allergic reactions, hemolytic reactions, transmission of diseases such as Hepatitis, AIDS and Cytomegalovirus (CMV) and fluid overload. In the event that I wish to have an autologous transfusion of my own blood, or a directed donor transfusion, I will discuss this with my physician. Check only if Refusing Blood or Blood Products  I understand refusal of blood or blood products as deemed necessary by my physician may have serious consequences to my condition to include possible death. I hereby assume responsibility for my refusal and release the hospital, its personnel, and my physicians from any responsibility for the consequences of my refusal.   [  ] Patient Refuses Blood      5. I authorize the use of any specimen, organs, tissues, body parts or foreign objects that may be removed from my body during the procedure for diagnosis, research or teaching purposes and their subsequent disposal by hospital authorities. I also authorize the release of specimen test results and/or written reports to my treating physician on the hospital medical staff or other referring or consulting physicians involved in my care, at the discretion of the Pathologist or my treating physician. 6.  I consent to the photographing or videotaping of the procedures to be performed, including appropriate portions of my body for medical, scientific, or educational purposes, provided my identity is not revealed by the pictures or by descriptive texts accompanying them. If the procedure has been photographed/videotaped, the physician will obtain the original picture, image, videotape or CD. The hospital will not be responsible for storage, release or maintenance of the picture, image, tape or CD.   7.  I consent to the presence of a  or observers in the operating room as deemed necessary by my physician or their designees. 8.  I recognize that in the event my procedure results in extended X-Ray/fluoroscopy time, I may develop a skin reaction. 9.   If I have a Do Not Attempt Resuscitation (DNAR) order in place, that status will be suspended while in the operating room, procedural suite, and during the recovery period unless otherwise explicitly stated by me (or a person authorized to consent on my behalf). The performing physician or my attending physician will determine when the applicable recovery period ends for purposes of reinstating the DNAR order. 10.  I acknowledge that my physician has explained sedation/analgesia administration to me including the risk and benefits I consent to the administration of sedation/analgesia as may be necessary or desirable in the judgment of my physician. I CERTIFY THAT I HAVE READ AND FULLY UNDERSTAND THE ABOVE CONSENT FOR THE PROCEDURE.    Signature of Patient: _____________________________________________________________  Responsible person in case of minor, unconscious: ____________________________________  Relationship to patient:  __________________________________________________________    Signature of Witness: _______________________________Date: _________Time: __________    Patient Name: Erin Dixon : 3/27/1955  Printed: 2022   Medical Record #: [id number]

## (undated) NOTE — LETTER
[address], [place]      Authorization for Surgical Operation and Procedure     Date:___________                                                                                                         Time:_______ 4.   Should the need arise during my operation or immediate post-operative period, I also consent to the administration of blood and/or blood products.   Further, I understand that despite careful testing and screening of blood or blood products by frank 8.   I recognize that in the event my procedure results in extended X-Ray/fluoroscopy time, I may develop a skin reaction. 9.  If I have a Do Not Attempt Resuscitation (DNAR) order in place, that status will be suspended while in the operating room, proc STATEMENT OF PHYSICIAN My signature below affirms that prior to the time of the procedure; I have explained to the patient and/or his/her legal representative, the risks and benefits involved in the proposed treatment and any reasonable alternative to the

## (undated) NOTE — LETTER
[facility] [place] [address]  [phone]  [phone]  Authorization for Imaging Procedure    I hereby authorize Dr. Juan De La O, ROBIN Marquez, my physician and his/her assistants (if applicable), which may include medical students, residents, and/or fellows, to perform the following procedure and administer such anesthesia as may be determined necessary by my physician: 57Edenilson Shi on Carlos Eduardo Jean. 2.  I recognize that during the procedure, unforeseen conditions may necessitate additional or different procedures than those listed above. I, therefore, further authorize and request that the above-named physician, assistants, or designees perform such procedures as are, in their judgment, necessary and desirable. 3.  My physician has discussed prior to my procedure the potential benefits, risks and side effects of this procedure; the likelihood of achieving goals; and potential problems that might occur during recuperation. They also discussed reasonable alternatives to the procedure, including risks, benefits, and side effects related to the alternatives and risks related to not receiving this procedure. I have had all my questions answered and I acknowledge that no guarantee has been made as to the result that may be obtained. 4.  Should the need arise during my procedure, which includes change of level of care prior to discharge, I also consent to the administration of blood and/or blood products. Further, I understand that despite careful testing and screening of blood or blood products by collecting agencies, I may still be subject to ill effects as a result of receiving a blood transfusion and/or blood products.  The following are some, but not all, of the potential risks that can occur: fever and allergic reactions, hemolytic reactions, transmission of diseases such as Hepatitis, AIDS and Cytomegalovirus (CMV) and fluid overload. In the event that I wish to have an autologous transfusion of my own blood, or a directed donor transfusion, I will discuss this with my physician. Check only if Refusing Blood or Blood Products  I understand refusal of blood or blood products as deemed necessary by my physician may have serious consequences to my condition to include possible death. I hereby assume responsibility for my refusal and release the hospital, its personnel, and my physicians from any responsibility for the consequences of my refusal.   [  ] Patient Refuses Blood      5. I authorize the use of any specimen, organs, tissues, body parts or foreign objects that may be removed from my body during the procedure for diagnosis, research or teaching purposes and their subsequent disposal by hospital authorities. I also authorize the release of specimen test results and/or written reports to my treating physician on the hospital medical staff or other referring or consulting physicians involved in my care, at the discretion of the Pathologist or my treating physician. 6.  I consent to the photographing or videotaping of the procedures to be performed, including appropriate portions of my body for medical, scientific, or educational purposes, provided my identity is not revealed by the pictures or by descriptive texts accompanying them. If the procedure has been photographed/videotaped, the physician will obtain the original picture, image, videotape or CD. The hospital will not be responsible for storage, release or maintenance of the picture, image, tape or CD.   7.  I consent to the presence of a  or observers in the operating room as deemed necessary by my physician or their designees. 8.  I recognize that in the event my procedure results in extended X-Ray/fluoroscopy time, I may develop a skin reaction. 9.   If I have a Do Not Attempt Resuscitation (DNAR) order in place, that status will be suspended while in the operating room, procedural suite, and during the recovery period unless otherwise explicitly stated by me (or a person authorized to consent on my behalf). The performing physician or my attending physician will determine when the applicable recovery period ends for purposes of reinstating the DNAR order. 10.  I acknowledge that my physician has explained sedation/analgesia administration to me including the risk and benefits I consent to the administration of sedation/analgesia as may be necessary or desirable in the judgment of my physician. I CERTIFY THAT I HAVE READ AND FULLY UNDERSTAND THE ABOVE CONSENT FOR THE PROCEDURE. Signature of Patient: _____________________________________________________________  Responsible person in case of minor, unconscious: ____________________________________  Relationship to patient:  __________________________________________________________  Signature of Witness: _______________________________Date: _________Time: __________    Statement of Physician: My signature below affirms that prior to the time of the procedure, I have explained to the patient and/or his guardian, the risks and benefits involved in the proposed treatment and any reasonable alternative to the proposed treatment. I have also explained the risks and benefits involved in the refusal of the proposed treatment and have answered the patient's questions. If I have a significant financial interest in a co-management agreement or a significant financial interest in any product or implant, or other significant relationship used in the procedure/surgery, I have disclosed this and had a discussion with my patient.   Signature of Physician:   _________________________________Date:_____________Time:________    Patient Name: Flower Heath : 3/27/1955  Printed: 2023   Medical Record #: [id number]

## (undated) NOTE — LETTER
10/28/21        Maryann Kerns  [address] [facility] [id number]      Dear Mikie Morrison,    [facility] records indicate that you have outstanding lab work and or testing that was ordered for you and has not yet been completed:  Orders Placed This En

## (undated) NOTE — LETTER
8/12/2023              Hernan Dan        [address] [place] [phone]         To whom it may concern. My patient requires extensive evaluation and treatment for chronic medical disorder. Please allow patient's wife Isabela Welsh take intermittent FMLA from work starting August 21, 2023 for 6 weeks. Patient requires her  presence and  assistance during this time .       Sincerely,    Chyna Vasquez MD  [place] [address]  [address] [place] [address]  [phone]        Document electronically generated by:  Chyna Vasquez MD

## (undated) NOTE — LETTER
[facility] ULTRASOUND  [address] [facility] [address]  [phone]  Authorization for Imaging Procedure    I hereby authorize ROBIN SHIN, my physician and his/her assistants (if applicable), which may include medical students, residents, and/or fellows, to perform the following procedure and administer such anesthesia as may be determined necessary by my physician: ULTRASOUND GUIDED PARACENTESIS WITH POSSIBLE INFUSION OF ALBUMIN on Joe Haney.   2.  I recognize that during the procedure, unforeseen conditions may necessitate additional or different procedures than those listed above. I, therefore, further authorize and request that the above-named physician, assistants, or designees perform such procedures as are, in their judgment, necessary and desirable.    3.  My physician has discussed prior to my procedure the potential benefits, risks and side effects of this procedure; the likelihood of achieving goals; and potential problems that might occur during recuperation. They also discussed reasonable alternatives to the procedure, including risks, benefits, and side effects related to the alternatives and risks related to not receiving this procedure. I have had all my questions answered and I acknowledge that no guarantee has been made as to the result that may be obtained.    4.  Should the need arise during my procedure, which includes change of level of care prior to discharge, I also consent to the administration of blood and/or blood products. Further, I understand that despite careful testing and screening of blood or blood products by collecting agencies, I may still be subject to ill effects as a result of receiving a blood transfusion and/or blood products. The following are some, but not all, of the potential risks that can occur: fever and allergic reactions, hemolytic reactions, transmission of diseases such as Hepatitis, AIDS and Cytomegalovirus (CMV) and  fluid overload. In the event that I wish to have an autologous transfusion of my own blood, or a directed donor transfusion, I will discuss this with my physician.  Check only if Refusing Blood or Blood Products  I understand refusal of blood or blood products as deemed necessary by my physician may have serious consequences to my condition to include possible death. I hereby assume responsibility for my refusal and release the hospital, its personnel, and my physicians from any responsibility for the consequences of my refusal.   [  ] Patient Refuses Blood      5.  I authorize the use of any specimen, organs, tissues, body parts or foreign objects that may be removed from my body during the procedure for diagnosis, research or teaching purposes and their subsequent disposal by hospital authorities. I also authorize the release of specimen test results and/or written reports to my treating physician on the hospital medical staff or other referring or consulting physicians involved in my care, at the discretion of the Pathologist or my treating physician.    6.  I consent to the photographing or videotaping of the procedures to be performed, including appropriate portions of my body for medical, scientific, or educational purposes, provided my identity is not revealed by the pictures or by descriptive texts accompanying them. If the procedure has been photographed/videotaped, the physician will obtain the original picture, image, videotape or CD. The hospital will not be responsible for storage, release or maintenance of the picture, image, tape or CD.   7.  I consent to the presence of a  or observers in the operating room as deemed necessary by my physician or their designees.    8.  I recognize that in the event my procedure results in extended X-Ray/fluoroscopy time, I may develop a skin reaction.    9.  If I have a Do Not Attempt Resuscitation (DNAR) order in place, that status will be  suspended while in the operating room, procedural suite, and during the recovery period unless otherwise explicitly stated by me (or a person authorized to consent on my behalf). The performing physician or my attending physician will determine when the applicable recovery period ends for purposes of reinstating the DNAR order.  10.  I acknowledge that my physician has explained sedation/analgesia administration to me including the risk and benefits I consent to the administration of sedation/analgesia as may be necessary or desirable in the judgment of my physician.      I CERTIFY THAT I HAVE READ AND FULLY UNDERSTAND THE ABOVE CONSENT FOR THE PROCEDURE.   Signature of Patient: _____________________________________________________________  Responsible person in case of minor, unconscious: ____________________________________  Relationship to patient:  __________________________________________________________  Signature of Witness: _______________________________Date: _________Time: __________    Statement of Physician: My signature below affirms that prior to the time of the procedure, I have explained to the patient and/or his guardian, the risks and benefits involved in the proposed treatment and any reasonable alternative to the proposed treatment. I have also explained the risks and benefits involved in the refusal of the proposed treatment and have answered the patient's questions. If I have a significant financial interest in a co-management agreement or a significant financial interest in any product or implant, or other significant relationship used in the procedure/surgery, I have disclosed this and had a discussion with my patient.  Signature of Physician:   _________________________________Date:_____________Time:________    Patient Name: Joe Haney : 3/27/1955  Printed: 2024   Medical Record #: [id number]

## (undated) NOTE — LETTER
[facility], [place]  Authorization for Invasive Procedures  1.  I hereby authorize LIBBY Lomax , my physician and whomever may be designated as the doctor's assistant, to perform the following operation and/or procedure 4. Should the need arise during my operation or immediate post-operative period; I also consent to the administration of blood and/or blood products.  Further, I understand that despite careful testing and screening of blood and blood products, I may still 9. Patients having a sterilization procedure: I understand that if the procedure is successful the results will be permanent and it will therefore be impossible for me to inseminate, conceive or bear children.  I also understand that the procedure is intend

## (undated) NOTE — LETTER
[address]      Authorization for Surgical Operation and Procedure     Date: 8-19-21                                                                                                        Time:__________ occur: fever and allergic reactions, hemolytic reactions, transmission of diseases such as Hepatitis, AIDS and Cytomegalovirus (CMV) and fluid overload.   In the event that I wish to have an autologous transfusion of my own blood, or a directed donor transf applicable recovery period ends for purposes of reinstating the DNAR order.   10. Patients having a sterilization procedure: I understand that if the procedure is successful the results will be permanent and it will therefore be impossible for me to insemin _______________________________________________________________ _____________________________  Last [facility] Physician)                                                                                         (Date)                                   (Time)

## (undated) NOTE — LETTER
[address] [facility]  [address] [facility] [address]  [phone]  Authorization for Imaging Procedure  Date of Procedure:     1. I hereby authorize _______________________,  my physician and his/her assistants (if applicable), which may include medical students, residents, and/or fellows, to perform the following procedure and administer such anesthesia as may be determined necessary by my physician: ULTRASOUND GUIDED PARACENTESIS WITH POSSIBLE INTRAVENOUS ALBUMIN 25% INFUSION  ([id number]) on [facility]. 2.  I recognize that during the procedure, unforeseen conditions may necessitate additional or different procedures than those listed above. I, therefore, further authorize and request that the above-named physician, assistants, or designees perform such procedures as are, in their judgment, necessary and desirable. 3.  My physician has discussed prior to my procedure the potential benefits, risks and side effects of this procedure; the likelihood of achieving goals; and potential problems that might occur during recuperation. They also discussed reasonable alternatives to the procedure, including risks, benefits, and side effects related to the alternatives and risks related to not receiving this procedure. I have had all my questions answered and I acknowledge that no guarantee has been made as to the result that may be obtained. 4.  Should the need arise during my procedure, which includes change of level of care prior to discharge, I also consent to the administration of blood and/or blood products. Further, I understand that despite careful testing and screening of blood or blood products by collecting agencies, I may still be subject to ill effects as a result of receiving a blood transfusion and/or blood products.  The following are some, but not all, of the potential risks that can occur: fever and allergic reactions, hemolytic reactions, transmission of diseases such as Hepatitis, AIDS and Cytomegalovirus (CMV) and fluid overload. In the event that I wish to have an autologous transfusion of my own blood, or a directed donor transfusion, I will discuss this with my physician. Check only if Refusing Blood or Blood Products  I understand refusal of blood or blood products as deemed necessary by my physician may have serious consequences to my condition to include possible death. I hereby assume responsibility for my refusal and release the hospital, its personnel, and my physicians from any responsibility for the consequences of my refusal.   [  ] Patient Refuses Blood      5. I authorize the use of any specimen, organs, tissues, body parts or foreign objects that may be removed from my body during the procedure for diagnosis, research or teaching purposes and their subsequent disposal by hospital authorities. I also authorize the release of specimen test results and/or written reports to my treating physician on the hospital medical staff or other referring or consulting physicians involved in my care, at the discretion of the Pathologist or my treating physician. 6.  I consent to the photographing or videotaping of the procedures to be performed, including appropriate portions of my body for medical, scientific, or educational purposes, provided my identity is not revealed by the pictures or by descriptive texts accompanying them. If the procedure has been photographed/videotaped, the physician will obtain the original picture, image, videotape or CD. The hospital will not be responsible for storage, release or maintenance of the picture, image, tape or CD.   7.  I consent to the presence of a  or observers in the operating room as deemed necessary by my physician or their designees. 8.  I recognize that in the event my procedure results in extended X-Ray/fluoroscopy time, I may develop a skin reaction. 9.   If I have a Do Not Attempt Resuscitation (DNAR) order in place, that status will be suspended while in the operating room, procedural suite, and during the recovery period unless otherwise explicitly stated by me (or a person authorized to consent on my behalf). The performing physician or my attending physician will determine when the applicable recovery period ends for purposes of reinstating the DNAR order. 10.  I acknowledge that my physician has explained sedation/analgesia administration to me including the risk and benefits I consent to the administration of sedation/analgesia as may be necessary or desirable in the judgment of my physician. I CERTIFY THAT I HAVE READ AND FULLY UNDERSTAND THE ABOVE CONSENT FOR THE PROCEDURE.    Signature of Patient: _____________________________________________________________  Responsible person in case of minor, unconscious: ____________________________________  Relationship to patient:  __________________________________________________________    Signature of Witness: _______________________________Date: _________Time: __________    Patient Name: Puma Osorio : 3/27/1955  Printed: 2022  Medical Record #: [id number]

## (undated) NOTE — LETTER
[address]  [address] [place], [place]      Authorization for Surgical Operation and Procedure     Date:___________                                                                                                         Time:_______ allergic reactions, hemolytic reactions, transmission of diseases such as Hepatitis, AIDS and Cytomegalovirus (CMV) and fluid overload. In the event that I wish to have an autologous transfusion of my own blood, or a directed donor transfusion.   I will di period ends for purposes of reinstating the DNAR order.   10. Patients having a sterilization procedure: I understand that if the procedure is successful the results will be permanent and it will therefore be impossible for me to inseminate, conceive, or be _______________________________________________________________ _____________________________  [facility] Phys[facility])                                                                                         (Date)                                   (Time)

## (undated) NOTE — LETTER
[address] [facility] [address]  [phone]  [phone]  Authorization for Imaging Procedure    I hereby authorize ROBIN Lopez, my physician and his/her assistants (if applicable), which may include medical students, residents, and/or fellows, to perform the following procedure and administer such anesthesia as may be determined necessary by my physician: [address] OF 25% ALBUMIN on [address]. 2.  I recognize that during the procedure, unforeseen conditions may necessitate additional or different procedures than those listed above. I, therefore, further authorize and request that the above-named physician, assistants, or designees perform such procedures as are, in their judgment, necessary and desirable. 3.  My physician has discussed prior to my procedure the potential benefits, risks and side effects of this procedure; the likelihood of achieving goals; and potential problems that might occur during recuperation. They also discussed reasonable alternatives to the procedure, including risks, benefits, and side effects related to the alternatives and risks related to not receiving this procedure. I have had all my questions answered and I acknowledge that no guarantee has been made as to the result that may be obtained. 4.  Should the need arise during my procedure, which includes change of level of care prior to discharge, I also consent to the administration of blood and/or blood products. Further, I understand that despite careful testing and screening of blood or blood products by collecting agencies, I may still be subject to ill effects as a result of receiving a blood transfusion and/or blood products.  The following are some, but not all, of the potential risks that can occur: fever and allergic reactions, hemolytic reactions, transmission of diseases such as Hepatitis, AIDS and Cytomegalovirus (CMV) and fluid overload. In the event that I wish to have an autologous transfusion of my own blood, or a directed donor transfusion, I will discuss this with my physician. Check only if Refusing Blood or Blood Products  I understand refusal of blood or blood products as deemed necessary by my physician may have serious consequences to my condition to include possible death. I hereby assume responsibility for my refusal and release the hospital, its personnel, and my physicians from any responsibility for the consequences of my refusal.   [  ] Patient Refuses Blood      5. I authorize the use of any specimen, organs, tissues, body parts or foreign objects that may be removed from my body during the procedure for diagnosis, research or teaching purposes and their subsequent disposal by hospital authorities. I also authorize the release of specimen test results and/or written reports to my treating physician on the hospital medical staff or other referring or consulting physicians involved in my care, at the discretion of the Pathologist or my treating physician. 6.  I consent to the photographing or videotaping of the procedures to be performed, including appropriate portions of my body for medical, scientific, or educational purposes, provided my identity is not revealed by the pictures or by descriptive texts accompanying them. If the procedure has been photographed/videotaped, the physician will obtain the original picture, image, videotape or CD. The hospital will not be responsible for storage, release or maintenance of the picture, image, tape or CD.   7.  I consent to the presence of a  or observers in the operating room as deemed necessary by my physician or their designees. 8.  I recognize that in the event my procedure results in extended X-Ray/fluoroscopy time, I may develop a skin reaction. 9.   If I have a Do Not Attempt Resuscitation (DNAR) order in place, that status will be suspended while in the operating room, procedural suite, and during the recovery period unless otherwise explicitly stated by me (or a person authorized to consent on my behalf). The performing physician or my attending physician will determine when the applicable recovery period ends for purposes of reinstating the DNAR order. 10.  I acknowledge that my physician has explained sedation/analgesia administration to me including the risk and benefits I consent to the administration of sedation/analgesia as may be necessary or desirable in the judgment of my physician. I CERTIFY THAT I HAVE READ AND FULLY UNDERSTAND THE ABOVE CONSENT FOR THE PROCEDURE. Signature of Patient: _____________________________________________________________  Responsible person in case of minor, unconscious: ____________________________________  Relationship to patient:  __________________________________________________________  Signature of Witness: _______________________________Date: _________Time: __________    Statement of Physician: My signature below affirms that prior to the time of the procedure, I have explained to the patient and/or his guardian, the risks and benefits involved in the proposed treatment and any reasonable alternative to the proposed treatment. I have also explained the risks and benefits involved in the refusal of the proposed treatment and have answered the patient's questions. If I have a significant financial interest in a co-management agreement or a significant financial interest in any product or implant, or other significant relationship used in the procedure/surgery, I have disclosed this and had a discussion with my patient.   Signature of Physician:   _________________________________Date:_____________Time:________    Patient Name: Ranjit Escudero : 3/27/1955  Printed: 2023   Medical Record #: [id number]

## (undated) NOTE — LETTER
Patient Name: Raisa White  : 3/27/1955  MRN: [id number]  Patient Address: [address] [place] [id number]      COVID-19 2022      [facility] is committed to the safety and well-being of our patients, members, em therapy. These treatments, when available and appropriate, should be given as soon as possible after diagnosis.       Seek Further Care      If you are awaiting test results or are confirmed positive for COVID-19, and your symptoms worsen at home with sympt doorknobs. Use household cleaning sprays or wipes according to the label instructions. Post-Discharge Follow-up  Please call your primary care provider within two days of your discharge to arrange for a telehealth follow-up.  CDC does not recommend Control & Prevention (CDC)  10 things you can do to manage your health at home, [email or web address]  [email or web address]